# Patient Record
Sex: MALE | Race: WHITE | NOT HISPANIC OR LATINO | Employment: OTHER | URBAN - METROPOLITAN AREA
[De-identification: names, ages, dates, MRNs, and addresses within clinical notes are randomized per-mention and may not be internally consistent; named-entity substitution may affect disease eponyms.]

---

## 2023-03-06 DIAGNOSIS — F32.A DEPRESSION, UNSPECIFIED DEPRESSION TYPE: Primary | ICD-10-CM

## 2023-03-06 DIAGNOSIS — G44.86 CERVICOGENIC HEADACHE: Primary | ICD-10-CM

## 2023-03-06 DIAGNOSIS — G44.86 CERVICOGENIC HEADACHE: ICD-10-CM

## 2023-03-06 RX ORDER — TOPIRAMATE 100 MG/1
1 TABLET, FILM COATED ORAL NIGHTLY
COMMUNITY
Start: 2021-03-10 | End: 2023-03-06 | Stop reason: SDUPTHER

## 2023-03-06 RX ORDER — DULOXETIN HYDROCHLORIDE 30 MG/1
1 CAPSULE, DELAYED RELEASE ORAL DAILY
COMMUNITY
Start: 2021-08-23 | End: 2023-03-06 | Stop reason: SDUPTHER

## 2023-03-06 RX ORDER — DULOXETIN HYDROCHLORIDE 60 MG/1
1 CAPSULE, DELAYED RELEASE ORAL DAILY
COMMUNITY
Start: 2021-08-23 | End: 2023-03-06 | Stop reason: SDUPTHER

## 2023-03-11 DIAGNOSIS — F32.A DEPRESSION, UNSPECIFIED DEPRESSION TYPE: ICD-10-CM

## 2023-03-11 PROBLEM — H02.402: Status: ACTIVE | Noted: 2023-03-11

## 2023-03-11 PROBLEM — K21.9 ESOPHAGEAL REFLUX: Status: ACTIVE | Noted: 2023-03-11

## 2023-03-11 PROBLEM — R35.0 URINARY FREQUENCY: Status: ACTIVE | Noted: 2023-03-11

## 2023-03-11 PROBLEM — I10 HYPERTENSION: Status: ACTIVE | Noted: 2023-03-11

## 2023-03-11 PROBLEM — S46.811A STRAIN OF TRAPEZIUS MUSCLE, RIGHT, INITIAL ENCOUNTER: Status: ACTIVE | Noted: 2023-03-11

## 2023-03-11 PROBLEM — M79.10 MYALGIA: Status: ACTIVE | Noted: 2023-03-11

## 2023-03-11 PROBLEM — G47.00 INSOMNIA: Status: ACTIVE | Noted: 2023-03-11

## 2023-03-11 PROBLEM — K40.90 INGUINAL HERNIA, LEFT: Status: ACTIVE | Noted: 2023-03-11

## 2023-03-11 PROBLEM — M79.645 PAIN OF FINGER OF LEFT HAND: Status: ACTIVE | Noted: 2023-03-11

## 2023-03-11 PROBLEM — K21.9 GERD (GASTROESOPHAGEAL REFLUX DISEASE): Status: ACTIVE | Noted: 2023-03-11

## 2023-03-11 PROBLEM — M25.521 RIGHT ELBOW PAIN: Status: ACTIVE | Noted: 2023-03-11

## 2023-03-11 PROBLEM — L85.3 XEROSIS CUTIS: Status: ACTIVE | Noted: 2023-03-11

## 2023-03-11 PROBLEM — S16.1XXA NECK STRAIN: Status: ACTIVE | Noted: 2023-03-11

## 2023-03-11 PROBLEM — H54.8 LEGALLY BLIND IN RIGHT EYE, AS DEFINED IN USA: Status: ACTIVE | Noted: 2023-03-11

## 2023-03-11 PROBLEM — F52.21 ERECTILE DISORDER, ACQUIRED, GENERALIZED, SEVERE: Status: ACTIVE | Noted: 2023-03-11

## 2023-03-11 PROBLEM — M25.50 ARTHRALGIA OF MULTIPLE SITES: Status: ACTIVE | Noted: 2023-03-11

## 2023-03-11 PROBLEM — E66.9 OBESITY: Status: ACTIVE | Noted: 2023-03-11

## 2023-03-11 PROBLEM — D75.89 MACROCYTOSIS: Status: ACTIVE | Noted: 2023-03-11

## 2023-03-11 PROBLEM — T83.511D URINARY TRACT INFECTION ASSOCIATED WITH CATHETERIZATION OF URINARY TRACT, SUBSEQUENT ENCOUNTER: Status: ACTIVE | Noted: 2023-03-11

## 2023-03-11 PROBLEM — G44.86 CERVICOGENIC HEADACHE: Status: ACTIVE | Noted: 2023-03-11

## 2023-03-11 PROBLEM — M51.36 DISC DEGENERATION, LUMBAR: Status: ACTIVE | Noted: 2023-03-11

## 2023-03-11 PROBLEM — Z96.0 S/P INSERTION OF PENILE IMPLANT: Status: ACTIVE | Noted: 2023-03-11

## 2023-03-11 PROBLEM — M54.50 LOW BACK PAIN: Status: ACTIVE | Noted: 2023-03-11

## 2023-03-11 PROBLEM — R68.82 LOW LIBIDO: Status: ACTIVE | Noted: 2023-03-11

## 2023-03-11 PROBLEM — J30.9 ALLERGIC RHINITIS: Status: ACTIVE | Noted: 2023-03-11

## 2023-03-11 PROBLEM — D53.9 MACROCYTIC ANEMIA: Status: ACTIVE | Noted: 2023-03-11

## 2023-03-11 PROBLEM — E11.9 DIABETES MELLITUS WITHOUT OPHTHALMIC MANIFESTATIONS (MULTI): Status: ACTIVE | Noted: 2023-03-11

## 2023-03-11 PROBLEM — G89.29 CHRONIC HEADACHES: Status: ACTIVE | Noted: 2023-03-11

## 2023-03-11 PROBLEM — G47.33 OBSTRUCTIVE SLEEP APNEA, ADULT: Status: ACTIVE | Noted: 2023-03-11

## 2023-03-11 PROBLEM — E78.5 DYSLIPIDEMIA: Status: ACTIVE | Noted: 2023-03-11

## 2023-03-11 PROBLEM — T83.518A: Status: ACTIVE | Noted: 2023-03-11

## 2023-03-11 PROBLEM — H43.819 PVD (POSTERIOR VITREOUS DETACHMENT): Status: ACTIVE | Noted: 2023-03-11

## 2023-03-11 PROBLEM — M79.646 THUMB PAIN: Status: ACTIVE | Noted: 2023-03-11

## 2023-03-11 PROBLEM — N39.0 RECURRENT URINARY TRACT INFECTION: Status: ACTIVE | Noted: 2023-03-11

## 2023-03-11 PROBLEM — H40.032 ANATOMICAL NARROW ANGLE OF LEFT EYE: Status: ACTIVE | Noted: 2023-03-11

## 2023-03-11 PROBLEM — H01.016 BILATERAL ULCERATIVE BLEPHARITIS: Status: ACTIVE | Noted: 2023-03-11

## 2023-03-11 PROBLEM — R60.9 DEPENDENT EDEMA: Status: ACTIVE | Noted: 2023-03-11

## 2023-03-11 PROBLEM — M18.11 PRIMARY OSTEOARTHRITIS OF FIRST CARPOMETACARPAL JOINT OF RIGHT HAND: Status: ACTIVE | Noted: 2023-03-11

## 2023-03-11 PROBLEM — E87.6 HYPOKALEMIA: Status: ACTIVE | Noted: 2023-03-11

## 2023-03-11 PROBLEM — M72.0 DUPUYTREN'S CONTRACTURE OF LEFT HAND: Status: ACTIVE | Noted: 2023-03-11

## 2023-03-11 PROBLEM — N39.0 URINARY TRACT INFECTION ASSOCIATED WITH CATHETERIZATION OF URINARY TRACT, SUBSEQUENT ENCOUNTER: Status: ACTIVE | Noted: 2023-03-11

## 2023-03-11 PROBLEM — H54.8 LEGALLY BLIND: Status: ACTIVE | Noted: 2023-03-11

## 2023-03-11 PROBLEM — E55.9 VITAMIN D DEFICIENCY: Status: ACTIVE | Noted: 2023-03-11

## 2023-03-11 PROBLEM — N52.9 MALE ERECTILE DISORDER OF ORGANIC ORIGIN: Status: ACTIVE | Noted: 2023-03-11

## 2023-03-11 PROBLEM — J45.909 ASTHMA (HHS-HCC): Status: ACTIVE | Noted: 2023-03-11

## 2023-03-11 PROBLEM — H25.11 CATARACT, NUCLEAR SCLEROTIC, RIGHT EYE: Status: ACTIVE | Noted: 2023-03-11

## 2023-03-11 PROBLEM — I73.9 PAD (PERIPHERAL ARTERY DISEASE) (CMS-HCC): Status: ACTIVE | Noted: 2023-03-11

## 2023-03-11 PROBLEM — M51.369 DISC DEGENERATION, LUMBAR: Status: ACTIVE | Noted: 2023-03-11

## 2023-03-11 PROBLEM — R94.31 ABNORMAL EKG: Status: ACTIVE | Noted: 2023-03-11

## 2023-03-11 PROBLEM — H02.401: Status: ACTIVE | Noted: 2023-03-11

## 2023-03-11 PROBLEM — R97.20 PSA ELEVATION: Status: ACTIVE | Noted: 2023-03-11

## 2023-03-11 PROBLEM — M79.7 FIBROMYALGIA: Status: ACTIVE | Noted: 2023-03-11

## 2023-03-11 PROBLEM — N28.89 RENAL CALCIFICATION: Status: ACTIVE | Noted: 2023-03-11

## 2023-03-11 PROBLEM — H54.40 BLIND RIGHT EYE: Status: ACTIVE | Noted: 2023-03-11

## 2023-03-11 PROBLEM — B35.1 ONYCHOMYCOSIS: Status: ACTIVE | Noted: 2023-03-11

## 2023-03-11 PROBLEM — K86.9 LESION OF PANCREAS (HHS-HCC): Status: ACTIVE | Noted: 2023-03-11

## 2023-03-11 PROBLEM — M54.14 THORACIC RADICULOPATHY: Status: ACTIVE | Noted: 2023-03-11

## 2023-03-11 PROBLEM — H40.032 NARROW ANGLE OF ANTERIOR CHAMBER OF LEFT EYE: Status: ACTIVE | Noted: 2023-03-11

## 2023-03-11 PROBLEM — R53.83 FATIGUE: Status: ACTIVE | Noted: 2023-03-11

## 2023-03-11 PROBLEM — H40.052 OCULAR HYPERTENSION OF LEFT EYE: Status: ACTIVE | Noted: 2023-03-11

## 2023-03-11 PROBLEM — T83.518A: Status: RESOLVED | Noted: 2023-03-11 | Resolved: 2023-03-11

## 2023-03-11 PROBLEM — M54.16 LEFT LUMBAR RADICULOPATHY: Status: ACTIVE | Noted: 2023-03-11

## 2023-03-11 PROBLEM — B35.3 TINEA PEDIS OF BOTH FEET: Status: ACTIVE | Noted: 2023-03-11

## 2023-03-11 PROBLEM — M79.609 LIMB PAIN: Status: ACTIVE | Noted: 2023-03-11

## 2023-03-11 PROBLEM — N39.0: Status: ACTIVE | Noted: 2023-03-11

## 2023-03-11 PROBLEM — H34.8112 CENTRAL RETINAL VEIN OCCLUSION OF RIGHT EYE (CMS-HCC): Status: ACTIVE | Noted: 2023-03-11

## 2023-03-11 PROBLEM — D64.9 ANEMIA: Status: ACTIVE | Noted: 2023-03-11

## 2023-03-11 PROBLEM — N40.1 ENLARGED PROSTATE WITH LOWER URINARY TRACT SYMPTOMS (LUTS): Status: ACTIVE | Noted: 2023-03-11

## 2023-03-11 PROBLEM — M54.2 NECK PAIN: Status: ACTIVE | Noted: 2023-03-11

## 2023-03-11 PROBLEM — R51.9 CHRONIC HEADACHES: Status: ACTIVE | Noted: 2023-03-11

## 2023-03-11 PROBLEM — R39.15 URINARY URGENCY: Status: ACTIVE | Noted: 2023-03-11

## 2023-03-11 PROBLEM — H04.123 BILATERAL DRY EYES: Status: ACTIVE | Noted: 2023-03-11

## 2023-03-11 PROBLEM — H01.013 BILATERAL ULCERATIVE BLEPHARITIS: Status: ACTIVE | Noted: 2023-03-11

## 2023-03-11 PROBLEM — I77.819 AORTIC DILATATION (CMS-HCC): Status: ACTIVE | Noted: 2023-03-11

## 2023-03-11 PROBLEM — M50.10 HERNIATION OF CERVICAL INTERVERTEBRAL DISC WITH RADICULOPATHY: Status: ACTIVE | Noted: 2023-03-11

## 2023-03-11 PROBLEM — M25.562 LEFT KNEE PAIN: Status: ACTIVE | Noted: 2023-03-11

## 2023-03-11 PROBLEM — N39.41 URGE INCONTINENCE OF URINE: Status: ACTIVE | Noted: 2023-03-11

## 2023-03-11 PROBLEM — D69.6 THROMBOCYTOPENIA (CMS-HCC): Status: ACTIVE | Noted: 2023-03-11

## 2023-03-11 PROBLEM — K86.1 CHRONIC PANCREATITIS (MULTI): Status: ACTIVE | Noted: 2023-03-11

## 2023-03-11 PROBLEM — M47.892 OTHER SPONDYLOSIS, CERVICAL REGION: Status: ACTIVE | Noted: 2023-03-11

## 2023-03-11 PROBLEM — M19.90 OSTEOARTHRITIS: Status: ACTIVE | Noted: 2023-03-11

## 2023-03-11 PROBLEM — H35.89 RETINAL MACULAR ATROPHY: Status: ACTIVE | Noted: 2023-03-11

## 2023-03-11 PROBLEM — H18.49: Status: ACTIVE | Noted: 2023-03-11

## 2023-03-11 PROBLEM — F40.240 CLAUSTROPHOBIA: Status: ACTIVE | Noted: 2023-03-11

## 2023-03-11 PROBLEM — Z96.1 PSEUDOPHAKIA OF LEFT EYE: Status: ACTIVE | Noted: 2023-03-11

## 2023-03-11 PROBLEM — H18.20 CORNEAL EDEMA: Status: ACTIVE | Noted: 2023-03-11

## 2023-03-11 PROBLEM — N39.0: Status: RESOLVED | Noted: 2023-03-11 | Resolved: 2023-03-11

## 2023-03-11 PROBLEM — M77.11 LATERAL EPICONDYLITIS OF RIGHT ELBOW: Status: ACTIVE | Noted: 2023-03-11

## 2023-03-11 PROBLEM — B00.52: Status: ACTIVE | Noted: 2023-03-11

## 2023-03-11 PROBLEM — E29.1 HYPOGONADISM MALE: Status: ACTIVE | Noted: 2023-03-11

## 2023-03-11 PROBLEM — N39.0 RECURRENT UTI: Status: ACTIVE | Noted: 2023-03-11

## 2023-03-11 PROBLEM — M47.816 LUMBAR SPONDYLOSIS: Status: ACTIVE | Noted: 2023-03-11

## 2023-03-11 PROBLEM — I87.2 CHRONIC VENOUS INSUFFICIENCY: Status: ACTIVE | Noted: 2023-03-11

## 2023-03-11 PROBLEM — H18.529 CORNEAL EPITHELIAL AND BASEMENT MEMBRANE DYSTROPHY: Status: ACTIVE | Noted: 2023-03-11

## 2023-03-11 PROBLEM — N52.8 OTHER MALE ERECTILE DYSFUNCTION: Status: ACTIVE | Noted: 2023-03-11

## 2023-03-11 PROBLEM — Q45.3 PANCREATIC ABNORMALITY: Status: ACTIVE | Noted: 2023-03-11

## 2023-03-11 PROBLEM — E85.9: Status: ACTIVE | Noted: 2023-03-11

## 2023-03-11 PROBLEM — N40.0 ENLARGED PROSTATE: Status: ACTIVE | Noted: 2023-03-11

## 2023-03-11 PROBLEM — Z98.84 HISTORY OF ROUX-EN-Y GASTRIC BYPASS: Status: ACTIVE | Noted: 2023-03-11

## 2023-03-11 PROBLEM — M96.1 POSTLAMINECTOMY SYNDROME, LUMBAR: Status: ACTIVE | Noted: 2023-03-11

## 2023-03-11 RX ORDER — PANTOPRAZOLE SODIUM 20 MG/1
1 TABLET, DELAYED RELEASE ORAL DAILY
COMMUNITY
Start: 2020-09-08 | End: 2023-09-09

## 2023-03-11 RX ORDER — ALBUTEROL SULFATE 90 UG/1
2 AEROSOL, METERED RESPIRATORY (INHALATION) EVERY 4 HOURS PRN
COMMUNITY
Start: 2016-01-12

## 2023-03-11 RX ORDER — PEN NEEDLE, DIABETIC 31 GX3/16"
NEEDLE, DISPOSABLE MISCELLANEOUS
COMMUNITY
Start: 2022-06-17 | End: 2023-08-30 | Stop reason: ALTCHOICE

## 2023-03-11 RX ORDER — LATANOPROST 50 UG/ML
1 SOLUTION/ DROPS OPHTHALMIC NIGHTLY
COMMUNITY
Start: 2020-06-29 | End: 2024-03-11 | Stop reason: SDUPTHER

## 2023-03-11 RX ORDER — MONTELUKAST SODIUM 10 MG/1
1 TABLET ORAL DAILY
COMMUNITY
Start: 2020-07-14 | End: 2023-09-09

## 2023-03-11 RX ORDER — CLONIDINE HYDROCHLORIDE 0.2 MG/1
1 TABLET ORAL 2 TIMES DAILY
COMMUNITY
Start: 2020-09-04 | End: 2023-08-30 | Stop reason: ALTCHOICE

## 2023-03-11 RX ORDER — LANCETS
EACH MISCELLANEOUS 3 TIMES DAILY
COMMUNITY
End: 2023-08-30 | Stop reason: ALTCHOICE

## 2023-03-11 RX ORDER — KETOCONAZOLE 20 MG/G
1 CREAM TOPICAL 2 TIMES DAILY
COMMUNITY
Start: 2019-03-01 | End: 2023-08-30 | Stop reason: ALTCHOICE

## 2023-03-11 RX ORDER — TALC
1 POWDER (GRAM) TOPICAL NIGHTLY
COMMUNITY
Start: 2015-08-07

## 2023-03-11 RX ORDER — ACYCLOVIR 800 MG/1
1 TABLET ORAL DAILY
COMMUNITY
Start: 2020-10-30 | End: 2023-09-09

## 2023-03-11 RX ORDER — TIZANIDINE 4 MG/1
1 TABLET ORAL 3 TIMES DAILY
COMMUNITY
Start: 2018-08-24 | End: 2023-09-09

## 2023-03-11 RX ORDER — BUDESONIDE AND FORMOTEROL FUMARATE DIHYDRATE 160; 4.5 UG/1; UG/1
2 AEROSOL RESPIRATORY (INHALATION) 2 TIMES DAILY
COMMUNITY
Start: 2020-07-07 | End: 2023-08-30 | Stop reason: ALTCHOICE

## 2023-03-11 RX ORDER — CHOLECALCIFEROL (VITAMIN D3) 50 MCG
2 TABLET ORAL 2 TIMES DAILY
COMMUNITY
Start: 2015-08-07

## 2023-03-11 RX ORDER — ATORVASTATIN CALCIUM 10 MG/1
1 TABLET, FILM COATED ORAL DAILY
COMMUNITY
Start: 2016-01-11 | End: 2023-09-09

## 2023-03-11 RX ORDER — BLOOD SUGAR DIAGNOSTIC
STRIP MISCELLANEOUS 3 TIMES DAILY
COMMUNITY
Start: 2013-10-21

## 2023-03-11 RX ORDER — DIPHENHYDRAMINE HCL 25 MG
50 TABLET ORAL NIGHTLY
COMMUNITY
Start: 2015-08-07 | End: 2024-02-14 | Stop reason: ALTCHOICE

## 2023-03-11 RX ORDER — GLIPIZIDE 10 MG/1
1 TABLET, FILM COATED, EXTENDED RELEASE ORAL
COMMUNITY
Start: 2020-05-07 | End: 2024-02-14 | Stop reason: ALTCHOICE

## 2023-03-11 RX ORDER — SODIUM CHLORIDE 50 MG/ML
1 SOLUTION/ DROPS OPHTHALMIC 2 TIMES DAILY
COMMUNITY
Start: 2021-03-15 | End: 2023-08-30 | Stop reason: ALTCHOICE

## 2023-03-11 RX ORDER — METFORMIN HYDROCHLORIDE 500 MG/1
4 TABLET, EXTENDED RELEASE ORAL
COMMUNITY
Start: 2018-05-15 | End: 2024-02-14 | Stop reason: SINTOL

## 2023-03-11 RX ORDER — SODIUM CHLORIDE 5 %
1 OINTMENT (GRAM) OPHTHALMIC (EYE) NIGHTLY
COMMUNITY
Start: 2021-03-15 | End: 2023-08-30 | Stop reason: ALTCHOICE

## 2023-03-11 RX ORDER — LISINOPRIL 40 MG/1
1 TABLET ORAL DAILY
COMMUNITY
Start: 2018-12-18 | End: 2023-09-09

## 2023-03-12 RX ORDER — TOPIRAMATE 100 MG/1
100 TABLET, FILM COATED ORAL NIGHTLY
Qty: 30 TABLET | Refills: 0 | Status: SHIPPED | OUTPATIENT
Start: 2023-03-12 | End: 2023-08-30 | Stop reason: SDUPTHER

## 2023-03-12 RX ORDER — DULOXETIN HYDROCHLORIDE 30 MG/1
30 CAPSULE, DELAYED RELEASE ORAL DAILY
Qty: 30 CAPSULE | Refills: 0 | Status: SHIPPED | OUTPATIENT
Start: 2023-03-12 | End: 2023-03-14 | Stop reason: SDUPTHER

## 2023-03-15 ENCOUNTER — TELEPHONE (OUTPATIENT)
Dept: PRIMARY CARE | Facility: CLINIC | Age: 73
End: 2023-03-15

## 2023-03-15 RX ORDER — DULOXETIN HYDROCHLORIDE 60 MG/1
CAPSULE, DELAYED RELEASE ORAL
Qty: 90 CAPSULE | Refills: 1 | Status: SHIPPED | OUTPATIENT
Start: 2023-03-15 | End: 2024-01-03 | Stop reason: SDUPTHER

## 2023-03-15 RX ORDER — TOPIRAMATE 100 MG/1
TABLET, FILM COATED ORAL
Qty: 90 TABLET | Refills: 1 | Status: SHIPPED | OUTPATIENT
Start: 2023-03-15 | End: 2024-02-14 | Stop reason: ALTCHOICE

## 2023-03-15 RX ORDER — DULOXETIN HYDROCHLORIDE 30 MG/1
30 CAPSULE, DELAYED RELEASE ORAL DAILY
Qty: 90 CAPSULE | Refills: 1 | Status: SHIPPED | OUTPATIENT
Start: 2023-03-15 | End: 2023-04-07

## 2023-03-15 RX ORDER — DULOXETIN HYDROCHLORIDE 30 MG/1
CAPSULE, DELAYED RELEASE ORAL
Qty: 90 CAPSULE | OUTPATIENT
Start: 2023-03-15

## 2023-04-05 DIAGNOSIS — F32.A DEPRESSION, UNSPECIFIED DEPRESSION TYPE: ICD-10-CM

## 2023-04-07 RX ORDER — DULOXETIN HYDROCHLORIDE 30 MG/1
CAPSULE, DELAYED RELEASE ORAL
Qty: 30 CAPSULE | Refills: 0 | Status: SHIPPED | OUTPATIENT
Start: 2023-04-07 | End: 2023-09-13 | Stop reason: SDUPTHER

## 2023-04-11 ENCOUNTER — OFFICE VISIT (OUTPATIENT)
Dept: PRIMARY CARE | Facility: CLINIC | Age: 73
End: 2023-04-11
Payer: MEDICARE

## 2023-04-11 VITALS
SYSTOLIC BLOOD PRESSURE: 133 MMHG | TEMPERATURE: 97.3 F | DIASTOLIC BLOOD PRESSURE: 71 MMHG | RESPIRATION RATE: 15 BRPM | HEIGHT: 68 IN | WEIGHT: 214 LBS | BODY MASS INDEX: 32.43 KG/M2 | OXYGEN SATURATION: 97 % | HEART RATE: 61 BPM

## 2023-04-11 DIAGNOSIS — G89.29 CHRONIC NONINTRACTABLE HEADACHE, UNSPECIFIED HEADACHE TYPE: ICD-10-CM

## 2023-04-11 DIAGNOSIS — N39.41 URGE INCONTINENCE OF URINE: ICD-10-CM

## 2023-04-11 DIAGNOSIS — R51.9 CHRONIC NONINTRACTABLE HEADACHE, UNSPECIFIED HEADACHE TYPE: ICD-10-CM

## 2023-04-11 DIAGNOSIS — I10 HYPERTENSION, UNSPECIFIED TYPE: ICD-10-CM

## 2023-04-11 DIAGNOSIS — K21.9 GASTROESOPHAGEAL REFLUX DISEASE WITHOUT ESOPHAGITIS: ICD-10-CM

## 2023-04-11 DIAGNOSIS — R53.83 OTHER FATIGUE: ICD-10-CM

## 2023-04-11 DIAGNOSIS — E11.9 DIABETES MELLITUS WITHOUT OPHTHALMIC MANIFESTATIONS (MULTI): ICD-10-CM

## 2023-04-11 DIAGNOSIS — Z12.5 SCREENING PSA (PROSTATE SPECIFIC ANTIGEN): ICD-10-CM

## 2023-04-11 DIAGNOSIS — S72.92XF TYPE III OPEN FRACTURE OF LEFT FEMUR WITH ROUTINE HEALING, UNSPECIFIED FRACTURE MORPHOLOGY, UNSPECIFIED PORTION OF FEMUR, SUBSEQUENT ENCOUNTER: ICD-10-CM

## 2023-04-11 DIAGNOSIS — E78.5 HYPERLIPIDEMIA, UNSPECIFIED HYPERLIPIDEMIA TYPE: ICD-10-CM

## 2023-04-11 DIAGNOSIS — K86.9 LESION OF PANCREAS (HHS-HCC): ICD-10-CM

## 2023-04-11 DIAGNOSIS — H34.8112 CENTRAL RETINAL VEIN OCCLUSION OF RIGHT EYE, UNSPECIFIED COMPLICATION STATUS (CMS-HCC): ICD-10-CM

## 2023-04-11 DIAGNOSIS — H04.123 BILATERAL DRY EYES: ICD-10-CM

## 2023-04-11 DIAGNOSIS — M89.9 DISORDER OF BONE, UNSPECIFIED: ICD-10-CM

## 2023-04-11 DIAGNOSIS — F32.89 OTHER DEPRESSION: Primary | ICD-10-CM

## 2023-04-11 PROBLEM — N39.0 RECURRENT URINARY TRACT INFECTION: Status: RESOLVED | Noted: 2023-03-11 | Resolved: 2023-04-11

## 2023-04-11 PROBLEM — N39.0 RECURRENT UTI: Status: RESOLVED | Noted: 2023-03-11 | Resolved: 2023-04-11

## 2023-04-11 PROBLEM — K86.1 CHRONIC PANCREATITIS (MULTI): Status: RESOLVED | Noted: 2023-03-11 | Resolved: 2023-04-11

## 2023-04-11 LAB
ALBUMIN (MG/L) IN URINE: 9.1 MG/L
ALBUMIN/CREATININE (UG/MG) IN URINE: 12.7 UG/MG CRT (ref 0–30)
CREATININE (MG/DL) IN URINE: 71.6 MG/DL (ref 20–370)

## 2023-04-11 PROCEDURE — 1036F TOBACCO NON-USER: CPT | Performed by: PEDIATRICS

## 2023-04-11 PROCEDURE — 3075F SYST BP GE 130 - 139MM HG: CPT | Performed by: PEDIATRICS

## 2023-04-11 PROCEDURE — 82570 ASSAY OF URINE CREATININE: CPT

## 2023-04-11 PROCEDURE — 1126F AMNT PAIN NOTED NONE PRSNT: CPT | Performed by: PEDIATRICS

## 2023-04-11 PROCEDURE — 99214 OFFICE O/P EST MOD 30 MIN: CPT | Performed by: PEDIATRICS

## 2023-04-11 PROCEDURE — 4010F ACE/ARB THERAPY RXD/TAKEN: CPT | Performed by: PEDIATRICS

## 2023-04-11 PROCEDURE — 82043 UR ALBUMIN QUANTITATIVE: CPT

## 2023-04-11 PROCEDURE — 1160F RVW MEDS BY RX/DR IN RCRD: CPT | Performed by: PEDIATRICS

## 2023-04-11 PROCEDURE — 1159F MED LIST DOCD IN RCRD: CPT | Performed by: PEDIATRICS

## 2023-04-11 PROCEDURE — 3078F DIAST BP <80 MM HG: CPT | Performed by: PEDIATRICS

## 2023-04-11 ASSESSMENT — PAIN SCALES - GENERAL: PAINLEVEL: 0-NO PAIN

## 2023-04-11 NOTE — ASSESSMENT & PLAN NOTE
Dealing with frequent incontinence, seeing urology recently for these issues. Scheduled for volumetric study and potential botex injection to the bladder.

## 2023-04-11 NOTE — ASSESSMENT & PLAN NOTE
"Depression has been \"running rampant\" recently. He has had a hard time finding a counselor on the [place] side [facility]. Taking Cymbalta 90mg daily and believes that it is not working as well recently because he is having breakthrough crying episodes. Plans to see a mental health nurse practitioner  "

## 2023-04-11 NOTE — PROGRESS NOTES
"Subjective   Patient ID: Elliot Larson is a 73 y.o. male who presents for Follow-up.    HPI Pt states that ever since he fell and broke his femur in Jan 2022 his life has went downhill. He endorses several recent falls about monthly. A fall in January was particularly bad -- fell on his hip and had a large bruise and also hit head. Did not lose consciousness. Had bronchitis over the winter and Azithromycin cleared it up quickly. Today he overall feels okay physically but is dealing with depression. Things have been difficult since his leg fracture. He is primarily upset about his loss of independence and mobility. Has not been checking his sugars at home. Is interested in a continuous glucose monitor but believes he may need to be on insulin. Still dealing with urinary incontinence. Has a hard time holding his urine in. He follows with urology for these issues. Denies recent illness, SOB, chest pain.   Colonoscopy due 2025  Shingles vaccine due  Will be seeing eye specialist  Review of Systems    Objective   /71 (BP Location: Right arm, Patient Position: Sitting, BP Cuff Size: Large adult)   Pulse 61   Temp 36.3 °C (97.3 °F) (Temporal)   Resp 15   Ht 1.727 m (5' 8\")   Wt 97.1 kg (214 lb)   SpO2 97%   BMI 32.54 kg/m²     Physical Exam  Vitals reviewed.   Constitutional:       General: He is not in acute distress.  HENT:      Head: Normocephalic.      Right Ear: Tympanic membrane normal.      Left Ear: Tympanic membrane normal.      Nose: Nose normal.      Mouth/Throat:      Pharynx: Oropharynx is clear.   Cardiovascular:      Rate and Rhythm: Normal rate and regular rhythm.      Heart sounds: Normal heart sounds.   Pulmonary:      Breath sounds: Normal breath sounds.   Abdominal:      Palpations: Abdomen is soft.      Tenderness: There is no abdominal tenderness.   Musculoskeletal:         General: No tenderness.      Cervical back: Neck supple.      Comments: Walks with cane; well healed scar over " "left knee and lower thigh   Skin:     Findings: No rash.      Comments: Dry skin both feet; hard to feel pedal pulses  Decreased sensation right heel   Neurological:      General: No focal deficit present.      Mental Status: He is alert.   Psychiatric:         Mood and Affect: Mood normal.         Assessment/Plan   Problem List Items Addressed This Visit          Nervous    Chronic headaches     Take topiramate daily. Helps sleep and somewhat helps headaches.             Circulatory    Central retinal vein occlusion of right eye     No vision in right eye since. Follows regularly with eye doctor.          Hypertension     133/71. Reports often missing doses of his BP meds.             Digestive    GERD (gastroesophageal reflux disease)     Well controlled with the Pantoprazole.          Lesion of pancreas     Has yearly MRIs for surveillance.             Genitourinary    Urge incontinence of urine     Dealing with frequent incontinence, seeing urology recently for these issues. Scheduled for volumetric study and potential botex injection to the bladder.             Endocrine/Metabolic    Diabetes mellitus without ophthalmic manifestations (CMS/HCC)     Not been checking his sugars. Is interested in continuous glucose monitoring.          Relevant Orders    Hemoglobin A1c    Albumin, urine, random (Completed)       Other    Bilateral dry eyes     Utilizing eye drops.         Depression - Primary     Depression has been \"running rampant\" recently. He has had a hard time finding a counselor on the [place] side [facility]. Taking Cymbalta 90mg daily and believes that it is not working as well recently because he is having breakthrough crying episodes. Plans to see a mental health nurse practitioner         Fatigue    Relevant Orders    TSH    Vitamin D, Total     Other Visit Diagnoses       Hyperlipidemia, unspecified hyperlipidemia type        Relevant Orders    Lipid panel    Screening PSA (prostate specific antigen)  "       Relevant Orders    PSA    Type III open fracture of left femur with routine healing, unspecified fracture morphology, unspecified portion of femur, subsequent encounter        Relevant Orders    Referral to Physical Therapy    Disability Placard    Disorder of bone, unspecified        Relevant Orders    Vitamin D, Total

## 2023-04-28 ENCOUNTER — TELEPHONE (OUTPATIENT)
Dept: PRIMARY CARE | Facility: CLINIC | Age: 73
End: 2023-04-28
Payer: MEDICARE

## 2023-04-28 NOTE — TELEPHONE ENCOUNTER
Unable to reach patient by phone left voice mail message for Patient we will mail out urine results.

## 2023-07-06 ENCOUNTER — TELEPHONE (OUTPATIENT)
Dept: PRIMARY CARE | Facility: CLINIC | Age: 73
End: 2023-07-06
Payer: MEDICARE

## 2023-07-06 NOTE — TELEPHONE ENCOUNTER
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 Called saying that this past weekend he fell on Sunday broke his hip and in a nursing home  now for rehab call him at [phone].

## 2023-08-15 ENCOUNTER — TELEPHONE (OUTPATIENT)
Dept: PRIMARY CARE | Facility: CLINIC | Age: 73
End: 2023-08-15

## 2023-08-30 ENCOUNTER — OFFICE VISIT (OUTPATIENT)
Dept: PRIMARY CARE | Facility: CLINIC | Age: 73
End: 2023-08-30
Payer: MEDICARE

## 2023-08-30 VITALS
HEIGHT: 68 IN | SYSTOLIC BLOOD PRESSURE: 147 MMHG | RESPIRATION RATE: 16 BRPM | WEIGHT: 207 LBS | DIASTOLIC BLOOD PRESSURE: 72 MMHG | TEMPERATURE: 97.3 F | OXYGEN SATURATION: 99 % | BODY MASS INDEX: 31.37 KG/M2 | HEART RATE: 60 BPM

## 2023-08-30 DIAGNOSIS — Z87.81: ICD-10-CM

## 2023-08-30 DIAGNOSIS — F32.89 OTHER DEPRESSION: ICD-10-CM

## 2023-08-30 DIAGNOSIS — E11.9 TYPE 2 DIABETES MELLITUS WITHOUT COMPLICATION, WITHOUT LONG-TERM CURRENT USE OF INSULIN (MULTI): ICD-10-CM

## 2023-08-30 DIAGNOSIS — E78.5 DYSLIPIDEMIA: ICD-10-CM

## 2023-08-30 DIAGNOSIS — Q45.3 PANCREATIC ABNORMALITY: ICD-10-CM

## 2023-08-30 DIAGNOSIS — J45.909 ASTHMA, UNSPECIFIED ASTHMA SEVERITY, UNSPECIFIED WHETHER COMPLICATED, UNSPECIFIED WHETHER PERSISTENT (HHS-HCC): ICD-10-CM

## 2023-08-30 DIAGNOSIS — E66.09 CLASS 1 OBESITY DUE TO EXCESS CALORIES WITH SERIOUS COMORBIDITY AND BODY MASS INDEX (BMI) OF 31.0 TO 31.9 IN ADULT: ICD-10-CM

## 2023-08-30 DIAGNOSIS — K21.9 GASTROESOPHAGEAL REFLUX DISEASE, UNSPECIFIED WHETHER ESOPHAGITIS PRESENT: ICD-10-CM

## 2023-08-30 DIAGNOSIS — I10 HYPERTENSION, UNSPECIFIED TYPE: Primary | ICD-10-CM

## 2023-08-30 DIAGNOSIS — R97.20 PSA ELEVATION: ICD-10-CM

## 2023-08-30 DIAGNOSIS — N32.81 OVERACTIVE BLADDER: ICD-10-CM

## 2023-08-30 DIAGNOSIS — G47.33 OBSTRUCTIVE SLEEP APNEA, ADULT: ICD-10-CM

## 2023-08-30 DIAGNOSIS — E55.9 VITAMIN D DEFICIENCY: ICD-10-CM

## 2023-08-30 DIAGNOSIS — M84.68XA PATHOLOGICAL FRACTURE IN OTHER DISEASE, OTHER SITE, INITIAL ENCOUNTER FOR FRACTURE: ICD-10-CM

## 2023-08-30 DIAGNOSIS — Z98.84 HISTORY OF GASTRIC BYPASS: ICD-10-CM

## 2023-08-30 PROBLEM — R26.2 DIFFICULTY WALKING: Status: ACTIVE | Noted: 2018-04-24

## 2023-08-30 PROBLEM — M67.972 DISORDER OF LEFT ACHILLES TENDON: Status: ACTIVE | Noted: 2018-04-24

## 2023-08-30 PROBLEM — N39.0 RECURRENT URINARY TRACT INFECTION: Status: ACTIVE | Noted: 2023-08-30

## 2023-08-30 PROBLEM — N39.0 RECURRENT UTI: Status: ACTIVE | Noted: 2023-08-30

## 2023-08-30 PROBLEM — K86.1 CHRONIC PANCREATITIS (MULTI): Status: ACTIVE | Noted: 2023-08-30

## 2023-08-30 PROBLEM — H40.059 OCULAR HYPERTENSION: Status: ACTIVE | Noted: 2023-08-30

## 2023-08-30 PROBLEM — U07.1 DISEASE DUE TO SEVERE ACUTE RESPIRATORY SYNDROME CORONAVIRUS 2 (SARS-COV-2): Status: ACTIVE | Noted: 2022-02-01

## 2023-08-30 PROBLEM — M77.11 RIGHT LATERAL EPICONDYLITIS: Status: ACTIVE | Noted: 2023-08-30

## 2023-08-30 PROBLEM — T81.10XA: Status: ACTIVE | Noted: 2022-02-01

## 2023-08-30 PROBLEM — M72.0 DUPUYTREN'S FIBROMATOSIS: Status: ACTIVE | Noted: 2023-08-30

## 2023-08-30 PROBLEM — M97.8XXA PERIPROSTHETIC FRACTURE AROUND INTERNAL PROSTHETIC KNEE JOINT: Status: ACTIVE | Noted: 2022-01-31

## 2023-08-30 PROBLEM — D62 ACUTE BLOOD LOSS ANEMIA: Status: ACTIVE | Noted: 2022-02-01

## 2023-08-30 PROBLEM — T83.511D URINARY TRACT INFECTION ASSOCIATED WITH CATHETERIZATION OF URINARY TRACT, SUBSEQUENT ENCOUNTER: Status: RESOLVED | Noted: 2023-03-11 | Resolved: 2023-08-30

## 2023-08-30 PROBLEM — U07.1 DISEASE DUE TO SEVERE ACUTE RESPIRATORY SYNDROME CORONAVIRUS 2 (SARS-COV-2): Status: RESOLVED | Noted: 2022-02-01 | Resolved: 2023-08-30

## 2023-08-30 PROBLEM — J20.9 ACUTE BRONCHITIS: Status: ACTIVE | Noted: 2023-08-30

## 2023-08-30 PROBLEM — E66.9 OBESITY, CLASS II, BMI 35-39.9: Status: ACTIVE | Noted: 2022-02-02

## 2023-08-30 PROBLEM — R51.9 ACUTE HEADACHE: Status: ACTIVE | Noted: 2023-08-30

## 2023-08-30 PROBLEM — N39.0 CATHETER-ASSOCIATED URINARY TRACT INFECTION (CMS-HCC): Status: ACTIVE | Noted: 2023-08-30

## 2023-08-30 PROBLEM — J95.2 ACUTE POSTOPERATIVE PULMONARY INSUFFICIENCY (CMS-HCC): Status: ACTIVE | Noted: 2022-02-01

## 2023-08-30 PROBLEM — R53.83 FATIGUE: Status: RESOLVED | Noted: 2023-03-11 | Resolved: 2023-08-30

## 2023-08-30 PROBLEM — Z96.659 PERIPROSTHETIC FRACTURE AROUND INTERNAL PROSTHETIC KNEE JOINT: Status: ACTIVE | Noted: 2022-01-31

## 2023-08-30 PROBLEM — N39.0 URINARY TRACT INFECTION ASSOCIATED WITH CATHETERIZATION OF URINARY TRACT, SUBSEQUENT ENCOUNTER: Status: RESOLVED | Noted: 2023-03-11 | Resolved: 2023-08-30

## 2023-08-30 PROBLEM — J20.9 ACUTE BRONCHITIS: Status: RESOLVED | Noted: 2023-08-30 | Resolved: 2023-08-30

## 2023-08-30 PROBLEM — G89.18 ACUTE POSTOPERATIVE PAIN: Status: ACTIVE | Noted: 2022-02-01

## 2023-08-30 PROBLEM — E66.812 OBESITY, CLASS II, BMI 35-39.9: Status: ACTIVE | Noted: 2022-02-02

## 2023-08-30 PROBLEM — T83.511A CATHETER-ASSOCIATED URINARY TRACT INFECTION (CMS-HCC): Status: ACTIVE | Noted: 2023-08-30

## 2023-08-30 PROCEDURE — 3078F DIAST BP <80 MM HG: CPT | Performed by: PEDIATRICS

## 2023-08-30 PROCEDURE — 99214 OFFICE O/P EST MOD 30 MIN: CPT | Performed by: PEDIATRICS

## 2023-08-30 PROCEDURE — 1160F RVW MEDS BY RX/DR IN RCRD: CPT | Performed by: PEDIATRICS

## 2023-08-30 PROCEDURE — 1159F MED LIST DOCD IN RCRD: CPT | Performed by: PEDIATRICS

## 2023-08-30 PROCEDURE — 1036F TOBACCO NON-USER: CPT | Performed by: PEDIATRICS

## 2023-08-30 PROCEDURE — 4010F ACE/ARB THERAPY RXD/TAKEN: CPT | Performed by: PEDIATRICS

## 2023-08-30 PROCEDURE — 1125F AMNT PAIN NOTED PAIN PRSNT: CPT | Performed by: PEDIATRICS

## 2023-08-30 PROCEDURE — 3077F SYST BP >= 140 MM HG: CPT | Performed by: PEDIATRICS

## 2023-08-30 PROCEDURE — 3008F BODY MASS INDEX DOCD: CPT | Performed by: PEDIATRICS

## 2023-08-30 RX ORDER — FLUTICASONE PROPIONATE AND SALMETEROL 250; 50 UG/1; UG/1
1 POWDER RESPIRATORY (INHALATION) 2 TIMES DAILY
COMMUNITY
Start: 2015-07-13 | End: 2023-08-30 | Stop reason: ALTCHOICE

## 2023-08-30 RX ORDER — FLUTICASONE FUROATE, UMECLIDINIUM BROMIDE AND VILANTEROL TRIFENATATE 200; 62.5; 25 UG/1; UG/1; UG/1
1 POWDER RESPIRATORY (INHALATION)
Qty: 1 EACH | Refills: 11 | Status: SHIPPED | OUTPATIENT
Start: 2023-08-30 | End: 2024-02-14 | Stop reason: ALTCHOICE

## 2023-08-30 RX ORDER — HYDROCHLOROTHIAZIDE 25 MG/1
TABLET ORAL
COMMUNITY
End: 2024-05-22 | Stop reason: ALTCHOICE

## 2023-08-30 RX ORDER — DOCUSATE SODIUM 100 MG/1
CAPSULE, LIQUID FILLED ORAL
COMMUNITY
End: 2023-08-30 | Stop reason: ALTCHOICE

## 2023-08-30 RX ORDER — BENZONATATE 200 MG/1
CAPSULE ORAL
COMMUNITY
End: 2023-08-30 | Stop reason: ALTCHOICE

## 2023-08-30 RX ORDER — CLONIDINE 0.3 MG/24H
PATCH, EXTENDED RELEASE TRANSDERMAL
Qty: 90 PATCH | Refills: 0 | Status: SHIPPED | OUTPATIENT
Start: 2023-08-30 | End: 2023-09-09 | Stop reason: ALTCHOICE

## 2023-08-30 RX ORDER — OXYCODONE HYDROCHLORIDE 5 MG/1
TABLET ORAL
COMMUNITY
End: 2023-08-30 | Stop reason: ALTCHOICE

## 2023-08-30 RX ORDER — DOXYCYCLINE 100 MG/1
CAPSULE ORAL
COMMUNITY
End: 2023-08-30 | Stop reason: ALTCHOICE

## 2023-08-30 RX ORDER — OFLOXACIN 3 MG/ML
SOLUTION/ DROPS OPHTHALMIC
COMMUNITY
End: 2023-08-30 | Stop reason: ALTCHOICE

## 2023-08-30 RX ORDER — TAMSULOSIN HYDROCHLORIDE 0.4 MG/1
CAPSULE ORAL
COMMUNITY
End: 2023-08-30 | Stop reason: ALTCHOICE

## 2023-08-30 RX ORDER — OXYBUTYNIN CHLORIDE 10 MG/1
TABLET, EXTENDED RELEASE ORAL
COMMUNITY
End: 2023-08-30 | Stop reason: SDUPTHER

## 2023-08-30 RX ORDER — GUAIFENESIN 600 MG/1
600 TABLET, EXTENDED RELEASE ORAL 2 TIMES DAILY
COMMUNITY
Start: 2015-07-13 | End: 2023-08-30 | Stop reason: ALTCHOICE

## 2023-08-30 RX ORDER — AMMONIUM LACTATE 12 G/100G
LOTION TOPICAL
COMMUNITY
End: 2023-08-30 | Stop reason: ALTCHOICE

## 2023-08-30 RX ORDER — PEN NEEDLE, DIABETIC 31 GX5/16"
NEEDLE, DISPOSABLE MISCELLANEOUS
COMMUNITY
Start: 2016-12-05 | End: 2023-08-30 | Stop reason: ALTCHOICE

## 2023-08-30 RX ORDER — TRAMADOL HYDROCHLORIDE 50 MG/1
TABLET ORAL
COMMUNITY
End: 2023-08-30 | Stop reason: ALTCHOICE

## 2023-08-30 RX ORDER — ASPIRIN 81 MG/1
81 TABLET ORAL 2 TIMES DAILY
COMMUNITY
Start: 2022-02-05 | End: 2023-08-30 | Stop reason: ALTCHOICE

## 2023-08-30 RX ORDER — FOLIC ACID 1 MG/1
TABLET ORAL
COMMUNITY
End: 2024-02-14 | Stop reason: ALTCHOICE

## 2023-08-30 RX ORDER — IPRATROPIUM BROMIDE AND ALBUTEROL SULFATE 2.5; .5 MG/3ML; MG/3ML
3 SOLUTION RESPIRATORY (INHALATION) EVERY 4 HOURS PRN
COMMUNITY
Start: 2015-07-13 | End: 2024-02-14 | Stop reason: ALTCHOICE

## 2023-08-30 RX ORDER — AZITHROMYCIN 250 MG/1
TABLET, FILM COATED ORAL
COMMUNITY
End: 2023-08-30 | Stop reason: ALTCHOICE

## 2023-08-30 RX ORDER — AMLODIPINE BESYLATE 10 MG/1
TABLET ORAL
COMMUNITY
End: 2024-02-14 | Stop reason: ALTCHOICE

## 2023-08-30 RX ORDER — HYDROXYZINE HYDROCHLORIDE 10 MG/1
10 TABLET, FILM COATED ORAL EVERY 6 HOURS PRN
COMMUNITY
Start: 2023-06-29 | End: 2023-08-30 | Stop reason: ALTCHOICE

## 2023-08-30 RX ORDER — NALOXONE HYDROCHLORIDE 4 MG/.1ML
SPRAY NASAL
COMMUNITY
Start: 2022-02-05 | End: 2023-08-30 | Stop reason: ALTCHOICE

## 2023-08-30 RX ORDER — OXYCODONE HYDROCHLORIDE 5 MG/1
5 TABLET ORAL EVERY 4 HOURS PRN
COMMUNITY
Start: 2023-07-06 | End: 2023-08-30 | Stop reason: ALTCHOICE

## 2023-08-30 RX ORDER — OXYBUTYNIN CHLORIDE 10 MG/1
TABLET, EXTENDED RELEASE ORAL
Qty: 90 TABLET | Refills: 1 | Status: SHIPPED | OUTPATIENT
Start: 2023-08-30 | End: 2023-09-04 | Stop reason: SDUPTHER

## 2023-08-30 RX ORDER — UBIDECARENONE 75 MG
1000 CAPSULE ORAL
COMMUNITY

## 2023-08-30 RX ORDER — TIRZEPATIDE 2.5 MG/.5ML
2.5 INJECTION, SOLUTION SUBCUTANEOUS
Qty: 2 ML | Refills: 1 | Status: SHIPPED | OUTPATIENT
Start: 2023-08-30 | End: 2023-09-09 | Stop reason: ALTCHOICE

## 2023-08-30 RX ORDER — CYANOCOBALAMIN 1000 UG/ML
INJECTION, SOLUTION INTRAMUSCULAR; SUBCUTANEOUS
COMMUNITY
End: 2023-08-30 | Stop reason: ALTCHOICE

## 2023-08-30 RX ORDER — ACETAMINOPHEN 500 MG
TABLET ORAL EVERY 6 HOURS
COMMUNITY

## 2023-08-30 RX ORDER — DOXYCYCLINE HYCLATE 100 MG
TABLET ORAL
COMMUNITY
End: 2023-08-30 | Stop reason: ALTCHOICE

## 2023-08-30 ASSESSMENT — PAIN SCALES - GENERAL: PAINLEVEL: 4

## 2023-08-30 NOTE — PROGRESS NOTES
"Subjective   Patient ID: Elliot Larson is a 73 y.o. male who presents for Hypertension, Hyperlipidemia, and Diabetes.    HPI   Fell and broke hip 6/24/2023; was in the hospital for a week and rehab for 4 weeks; walking with walker; broke hip, knee, femur and vertebrae with 4 falls  HTN- does not take bp at home; cuff broke  DM- does not check glucose  Last BMP 7/2022- glucose 137; chloride 109   Last A1c 7/2022- 6.0  Vitamin D was low at 25 and calcium low at 8.1; he was discharged last admission in June with vitamin D2 50,000 twice a week and now receives vitamin D3 4000 units twice  a day.  Had a \"sliver\" of cheesecake at 5am- unsure about doing labs today  Colonoscopy due 2025  Diet- breakfast: coffee with cream; Lunch: protein, vegetable low salt; dinner: pizza; snack: popcorn, peanuts  Worried about falls and breaking bones has made changes in house such as getting rid of loose rugs; walks with a walker    Review of Systems  No CP or SOB  Urinary urgency  Objective   /72 (BP Location: Left arm, Patient Position: Sitting, BP Cuff Size: Adult)   Pulse 60   Temp 36.3 °C (97.3 °F) (Temporal)   Resp 16   Ht 1.727 m (5' 8\")   Wt 93.9 kg (207 lb)   SpO2 99%   BMI 31.47 kg/m²     Physical Exam  HEENT ok  Lungs--mild wheeze  Heart reg  Feet ok  Assessment/Plan   Problem List Items Addressed This Visit       Asthma    Relevant Medications    fluticasone-umeclidin-vilanter (Trelegy Ellipta) 200-62.5-25 mcg blister with device    Depression    Dyslipidemia    Relevant Orders    Lipid Panel    Thyroid Stimulating Hormone    Hypertension - Primary    Relevant Medications    cloNIDine (Catapres-TTS-3) 0.3 mg/24 hr patch    Pancreatic abnormality     Will see gastroenterology soon         Obesity    Relevant Orders    Comprehensive Metabolic Panel    RESOLVED: Obstructive sleep apnea, adult    PSA elevation     Follows with Dr Gant         Relevant Orders    Prostate Specific Antigen    Vitamin D deficiency    " Esophageal reflux    Diabetes mellitus (CMS/[facility])    Relevant Orders    Hemoglobin A1C    Vitamin B12     Other Visit Diagnoses       History of recurrent fracture of vertebra        Relevant Orders    XR DEXA bone density    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

## 2023-08-31 DIAGNOSIS — F32.A DEPRESSION, UNSPECIFIED DEPRESSION TYPE: ICD-10-CM

## 2023-08-31 DIAGNOSIS — N32.81 OVERACTIVE BLADDER: ICD-10-CM

## 2023-09-04 DIAGNOSIS — E11.9 TYPE 2 DIABETES MELLITUS WITHOUT COMPLICATION, WITHOUT LONG-TERM CURRENT USE OF INSULIN (MULTI): Primary | ICD-10-CM

## 2023-09-04 DIAGNOSIS — N32.81 OVERACTIVE BLADDER: ICD-10-CM

## 2023-09-04 DIAGNOSIS — I10 HYPERTENSION, UNSPECIFIED TYPE: ICD-10-CM

## 2023-09-04 RX ORDER — OXYBUTYNIN CHLORIDE 10 MG/1
TABLET, EXTENDED RELEASE ORAL
Qty: 90 TABLET | Refills: 1 | Status: SHIPPED | OUTPATIENT
Start: 2023-09-04 | End: 2024-03-07

## 2023-09-06 DIAGNOSIS — I10 HYPERTENSION, UNSPECIFIED TYPE: Primary | ICD-10-CM

## 2023-09-06 DIAGNOSIS — M51.36 DISC DEGENERATION, LUMBAR: ICD-10-CM

## 2023-09-06 DIAGNOSIS — J45.909 ASTHMA, UNSPECIFIED ASTHMA SEVERITY, UNSPECIFIED WHETHER COMPLICATED, UNSPECIFIED WHETHER PERSISTENT (HHS-HCC): ICD-10-CM

## 2023-09-06 DIAGNOSIS — E78.5 DYSLIPIDEMIA: ICD-10-CM

## 2023-09-06 DIAGNOSIS — K21.9 GASTROESOPHAGEAL REFLUX DISEASE WITHOUT ESOPHAGITIS: ICD-10-CM

## 2023-09-06 DIAGNOSIS — Z00.00 HEALTHCARE MAINTENANCE: ICD-10-CM

## 2023-09-08 ENCOUNTER — LAB (OUTPATIENT)
Dept: LAB | Facility: LAB | Age: 73
End: 2023-09-08
Payer: MEDICARE

## 2023-09-08 DIAGNOSIS — E66.09 CLASS 1 OBESITY DUE TO EXCESS CALORIES WITH SERIOUS COMORBIDITY AND BODY MASS INDEX (BMI) OF 31.0 TO 31.9 IN ADULT: ICD-10-CM

## 2023-09-08 DIAGNOSIS — R53.83 OTHER FATIGUE: ICD-10-CM

## 2023-09-08 DIAGNOSIS — R97.20 PSA ELEVATION: ICD-10-CM

## 2023-09-08 DIAGNOSIS — Z98.84 HISTORY OF GASTRIC BYPASS: ICD-10-CM

## 2023-09-08 DIAGNOSIS — E78.5 DYSLIPIDEMIA: ICD-10-CM

## 2023-09-08 DIAGNOSIS — E11.9 TYPE 2 DIABETES MELLITUS WITHOUT COMPLICATION, WITHOUT LONG-TERM CURRENT USE OF INSULIN (MULTI): ICD-10-CM

## 2023-09-08 DIAGNOSIS — M84.68XA PATHOLOGICAL FRACTURE IN OTHER DISEASE, OTHER SITE, INITIAL ENCOUNTER FOR FRACTURE: ICD-10-CM

## 2023-09-08 DIAGNOSIS — E55.9 VITAMIN D DEFICIENCY: ICD-10-CM

## 2023-09-08 DIAGNOSIS — E78.5 HYPERLIPIDEMIA, UNSPECIFIED HYPERLIPIDEMIA TYPE: ICD-10-CM

## 2023-09-08 DIAGNOSIS — E11.9 DIABETES MELLITUS WITHOUT OPHTHALMIC MANIFESTATIONS (MULTI): ICD-10-CM

## 2023-09-08 DIAGNOSIS — Z12.5 SCREENING PSA (PROSTATE SPECIFIC ANTIGEN): ICD-10-CM

## 2023-09-08 DIAGNOSIS — M89.9 DISORDER OF BONE, UNSPECIFIED: ICD-10-CM

## 2023-09-08 LAB
ALANINE AMINOTRANSFERASE (SGPT) (U/L) IN SER/PLAS: 15 U/L (ref 10–52)
ALBUMIN (G/DL) IN SER/PLAS: 3.8 G/DL (ref 3.4–5)
ALKALINE PHOSPHATASE (U/L) IN SER/PLAS: 110 U/L (ref 33–136)
ANION GAP IN SER/PLAS: 13 MMOL/L (ref 10–20)
ASPARTATE AMINOTRANSFERASE (SGOT) (U/L) IN SER/PLAS: 16 U/L (ref 9–39)
BILIRUBIN TOTAL (MG/DL) IN SER/PLAS: 0.8 MG/DL (ref 0–1.2)
CALCIDIOL (25 OH VITAMIN D3) (NG/ML) IN SER/PLAS: 37 NG/ML
CALCIUM (MG/DL) IN SER/PLAS: 9.2 MG/DL (ref 8.6–10.3)
CARBON DIOXIDE, TOTAL (MMOL/L) IN SER/PLAS: 24 MMOL/L (ref 21–32)
CHLORIDE (MMOL/L) IN SER/PLAS: 107 MMOL/L (ref 98–107)
CHOLESTEROL (MG/DL) IN SER/PLAS: 131 MG/DL (ref 0–199)
CHOLESTEROL IN HDL (MG/DL) IN SER/PLAS: 85.4 MG/DL
CHOLESTEROL/HDL RATIO: 1.5
COBALAMIN (VITAMIN B12) (PG/ML) IN SER/PLAS: 1944 PG/ML (ref 211–911)
CREATININE (MG/DL) IN SER/PLAS: 0.98 MG/DL (ref 0.5–1.3)
GFR MALE: 81 ML/MIN/1.73M2
GLUCOSE (MG/DL) IN SER/PLAS: 124 MG/DL (ref 74–99)
LDL: 34 MG/DL (ref 0–99)
PHOSPHATE (MG/DL) IN SER/PLAS: 3.6 MG/DL (ref 2.5–4.9)
POTASSIUM (MMOL/L) IN SER/PLAS: 3.7 MMOL/L (ref 3.5–5.3)
PROSTATE SPECIFIC AG (NG/ML) IN SER/PLAS: 1.54 NG/ML (ref 0–4)
PROTEIN TOTAL: 6.7 G/DL (ref 6.4–8.2)
SODIUM (MMOL/L) IN SER/PLAS: 140 MMOL/L (ref 136–145)
THYROTROPIN (MIU/L) IN SER/PLAS BY DETECTION LIMIT <= 0.05 MIU/L: 2.09 MIU/L (ref 0.44–3.98)
TRIGLYCERIDE (MG/DL) IN SER/PLAS: 59 MG/DL (ref 0–149)
UREA NITROGEN (MG/DL) IN SER/PLAS: 22 MG/DL (ref 6–23)
VLDL: 12 MG/DL (ref 0–40)

## 2023-09-08 PROCEDURE — 82306 VITAMIN D 25 HYDROXY: CPT

## 2023-09-08 PROCEDURE — 80061 LIPID PANEL: CPT

## 2023-09-08 PROCEDURE — 83036 HEMOGLOBIN GLYCOSYLATED A1C: CPT

## 2023-09-08 PROCEDURE — G0103 PSA SCREENING: HCPCS

## 2023-09-08 PROCEDURE — 80053 COMPREHEN METABOLIC PANEL: CPT

## 2023-09-08 PROCEDURE — 36415 COLL VENOUS BLD VENIPUNCTURE: CPT

## 2023-09-08 PROCEDURE — 84443 ASSAY THYROID STIM HORMONE: CPT

## 2023-09-08 PROCEDURE — 82607 VITAMIN B-12: CPT

## 2023-09-08 PROCEDURE — 84100 ASSAY OF PHOSPHORUS: CPT

## 2023-09-09 RX ORDER — ATORVASTATIN CALCIUM 10 MG/1
10 TABLET, FILM COATED ORAL DAILY
Qty: 90 TABLET | Refills: 1 | Status: SHIPPED | OUTPATIENT
Start: 2023-09-09 | End: 2024-01-02 | Stop reason: ALTCHOICE

## 2023-09-09 RX ORDER — PANTOPRAZOLE SODIUM 20 MG/1
20 TABLET, DELAYED RELEASE ORAL DAILY
Qty: 90 TABLET | Refills: 1 | Status: SHIPPED | OUTPATIENT
Start: 2023-09-09 | End: 2024-05-25

## 2023-09-09 RX ORDER — CLONIDINE HYDROCHLORIDE 0.2 MG/1
0.2 TABLET ORAL 2 TIMES DAILY
Qty: 180 TABLET | OUTPATIENT
Start: 2023-09-09

## 2023-09-09 RX ORDER — TIRZEPATIDE 5 MG/.5ML
5 INJECTION, SOLUTION SUBCUTANEOUS
Qty: 4 ML | Refills: 0 | Status: SHIPPED | OUTPATIENT
Start: 2023-09-09 | End: 2024-01-02 | Stop reason: ALTCHOICE

## 2023-09-09 RX ORDER — ACYCLOVIR 800 MG/1
800 TABLET ORAL DAILY
Qty: 90 TABLET | Refills: 1 | Status: SHIPPED | OUTPATIENT
Start: 2023-09-09 | End: 2024-04-30

## 2023-09-09 RX ORDER — LISINOPRIL 40 MG/1
40 TABLET ORAL DAILY
Qty: 90 TABLET | Refills: 1 | Status: SHIPPED | OUTPATIENT
Start: 2023-09-09 | End: 2024-05-16

## 2023-09-09 RX ORDER — MONTELUKAST SODIUM 10 MG/1
10 TABLET ORAL DAILY
Qty: 90 TABLET | Refills: 1 | Status: SHIPPED | OUTPATIENT
Start: 2023-09-09 | End: 2024-04-30

## 2023-09-09 RX ORDER — TIZANIDINE 4 MG/1
4 TABLET ORAL 3 TIMES DAILY
Qty: 270 TABLET | Refills: 1 | Status: SHIPPED | OUTPATIENT
Start: 2023-09-09 | End: 2024-04-30

## 2023-09-09 RX ORDER — CLONIDINE HYDROCHLORIDE 0.3 MG/1
0.3 TABLET ORAL 2 TIMES DAILY
Qty: 180 TABLET | Refills: 0 | Status: SHIPPED | OUTPATIENT
Start: 2023-09-09 | End: 2023-12-21 | Stop reason: SDUPTHER

## 2023-09-13 LAB
ESTIMATED AVERAGE GLUCOSE FOR HBA1C: 126 MG/DL
HEMOGLOBIN A1C/HEMOGLOBIN TOTAL IN BLOOD: 6 %

## 2023-09-13 RX ORDER — OXYBUTYNIN CHLORIDE 10 MG/1
TABLET, EXTENDED RELEASE ORAL
Qty: 90 TABLET | Refills: 1 | OUTPATIENT
Start: 2023-09-13

## 2023-09-13 RX ORDER — DULOXETIN HYDROCHLORIDE 30 MG/1
30 CAPSULE, DELAYED RELEASE ORAL DAILY
Qty: 30 CAPSULE | Refills: 2 | Status: SHIPPED | OUTPATIENT
Start: 2023-09-13 | End: 2023-12-21 | Stop reason: SDUPTHER

## 2023-10-09 ENCOUNTER — TELEPHONE (OUTPATIENT)
Dept: PRIMARY CARE | Facility: CLINIC | Age: 73
End: 2023-10-09
Payer: MEDICARE

## 2023-10-09 DIAGNOSIS — Z87.81 S/P LEFT HIP FRACTURE: Primary | ICD-10-CM

## 2023-10-09 NOTE — TELEPHONE ENCOUNTER
, Pt. Left a voicemail message saying he needs a referral for PT for [facility] broken his hip in June in home PT is finished need referral to continue Pt. If with questions call Pt. Back at [phone].

## 2023-10-25 ENCOUNTER — TELEPHONE (OUTPATIENT)
Dept: GASTROENTEROLOGY | Facility: CLINIC | Age: 73
End: 2023-10-25
Payer: MEDICARE

## 2023-10-25 ENCOUNTER — TELEPHONE (OUTPATIENT)
Dept: PRIMARY CARE | Facility: CLINIC | Age: 73
End: 2023-10-25
Payer: MEDICARE

## 2023-10-25 DIAGNOSIS — K86.9 LESION OF PANCREAS (HHS-HCC): Primary | ICD-10-CM

## 2023-10-25 NOTE — TELEPHONE ENCOUNTER
Patient left message on line that he is due for her MRI of his Pancreas that Dr. Arevalo orders for him.

## 2023-11-06 ENCOUNTER — APPOINTMENT (OUTPATIENT)
Dept: PHYSICAL THERAPY | Facility: CLINIC | Age: 73
End: 2023-11-06
Payer: MEDICARE

## 2023-11-06 DIAGNOSIS — L29.9 GENERALIZED PRURITUS: Primary | ICD-10-CM

## 2023-11-06 DIAGNOSIS — D64.9 ANEMIA, UNSPECIFIED TYPE: Primary | ICD-10-CM

## 2023-11-13 ENCOUNTER — APPOINTMENT (OUTPATIENT)
Dept: HEMATOLOGY/ONCOLOGY | Facility: CLINIC | Age: 73
End: 2023-11-13
Payer: MEDICARE

## 2023-11-13 ENCOUNTER — LAB (OUTPATIENT)
Dept: LAB | Facility: CLINIC | Age: 73
End: 2023-11-13
Payer: MEDICARE

## 2023-11-13 DIAGNOSIS — D53.9 MACROCYTIC ANEMIA: ICD-10-CM

## 2023-11-13 LAB
BASOPHILS # BLD AUTO: 0.02 X10*3/UL (ref 0–0.1)
BASOPHILS NFR BLD AUTO: 0.4 %
EOSINOPHIL # BLD AUTO: 0.22 X10*3/UL (ref 0–0.4)
EOSINOPHIL NFR BLD AUTO: 4 %
ERYTHROCYTE [DISTWIDTH] IN BLOOD BY AUTOMATED COUNT: 12.9 % (ref 11.5–14.5)
FERRITIN SERPL-MCNC: 43 NG/ML (ref 20–300)
HCT VFR BLD AUTO: 43.6 % (ref 41–52)
HGB BLD-MCNC: 14.5 G/DL (ref 13.5–17.5)
IMM GRANULOCYTES # BLD AUTO: 0.01 X10*3/UL (ref 0–0.5)
IMM GRANULOCYTES NFR BLD AUTO: 0.2 % (ref 0–0.9)
IRON SATN MFR SERPL: 36 % (ref 25–45)
IRON SERPL-MCNC: 130 UG/DL (ref 35–150)
LYMPHOCYTES # BLD AUTO: 1.6 X10*3/UL (ref 0.8–3)
LYMPHOCYTES NFR BLD AUTO: 29.1 %
MCH RBC QN AUTO: 33.9 PG (ref 26–34)
MCHC RBC AUTO-ENTMCNC: 33.3 G/DL (ref 32–36)
MCV RBC AUTO: 102 FL (ref 80–100)
MONOCYTES # BLD AUTO: 0.36 X10*3/UL (ref 0.05–0.8)
MONOCYTES NFR BLD AUTO: 6.5 %
NEUTROPHILS # BLD AUTO: 3.29 X10*3/UL (ref 1.6–5.5)
NEUTROPHILS NFR BLD AUTO: 59.8 %
PLATELET # BLD AUTO: 195 X10*3/UL (ref 150–450)
RBC # BLD AUTO: 4.28 X10*6/UL (ref 4.5–5.9)
TIBC SERPL-MCNC: 362 UG/DL (ref 240–445)
UIBC SERPL-MCNC: 232 UG/DL (ref 110–370)
VIT B12 SERPL-MCNC: 251 PG/ML (ref 211–911)
WBC # BLD AUTO: 5.5 X10*3/UL (ref 4.4–11.3)

## 2023-11-13 PROCEDURE — 83550 IRON BINDING TEST: CPT

## 2023-11-13 PROCEDURE — 82607 VITAMIN B-12: CPT

## 2023-11-13 PROCEDURE — 36415 COLL VENOUS BLD VENIPUNCTURE: CPT

## 2023-11-13 PROCEDURE — 82728 ASSAY OF FERRITIN: CPT

## 2023-11-13 PROCEDURE — 85025 COMPLETE CBC W/AUTO DIFF WBC: CPT

## 2023-11-13 NOTE — PROGRESS NOTES
Patient ID: Elliot Larson is a 73 y.o. male.      Subjective    HPI    Mr. Larson is a 71 y/o M with a history of hypertension, diabetes, recurrent UTIs, gastric bypass and BPH who presents for follow-up for anemia.     Reviewing patient's records patient is noted to have an anemia since at least every 2018 when his hemoglobin was 13. More recently he is noted to have a hemoglobin of 12.1. He denies any obvious blood loss in the stool or urine. States colonoscopy which  was completed last in November 2015 was normal. She also underwent an EGD at that time which was also normal. Patient underwent hernia repair in September 2019 and denies any significant blood loss in need of blood transfusions. Initial visit 12/4/19  started on vitamin B12 supplements.      Interval Events:      2/20/23: Patient presents for follow-up for anemia. Blood work today shows a hemoglobin of 12.6 with no other cytopenias. Hemoglobin has been stable for some time. Patient has had several falls. He reports depression related to personal issues and he  would like a referral to a psychologist.        11/14/23: Labs done yesterday show WBC 4.6, hgb 13.9, plt 153,000, . Has had macrycytosis since at least Dec of 2020.  Since last visit patient had a fall and broke his hip in June 2023, he was hospitalized and had surgery followed by a stay at a rehab facility. He reports he is now feeling very well and energy is good. He denies signs or symptoms of bleeding other than mild with oral care at times. He is taking Monjuro so his appetite is decreased. He denies infections, fevers, chills, GI symptoms, SOB, chest pain or edema. He is walking with a walker and enjoys spending time with his dog and has a job interview tomorrow. He gets lunch delivered daily. He is due for pancreas MRI and last PCP visit was in August, due for colonoscopy in 2025.     ROS 14 points performed, See HPI for exceptions       PMHx: hypertension, diabetes,  recurrent UTIs and BPH, pancreatic cyst     PSHx: gastric bypass     Social Hx: Patient is a former smoker. Denies any illicit drug use. Patient is single. He has no children.      Objective    Visit Vitals  /74   Pulse 80   Temp 36.2 °C (97.2 °F)   Resp 16   Wt 94 kg (207 lb 3.7 oz)   SpO2 97%   BMI 31.51 kg/m²   Smoking Status Former   BSA 2.12 m²          Physical Exam  Vitals reviewed.   Constitutional:       Appearance: Normal appearance.   HENT:      Head: Normocephalic and atraumatic.      Nose: Nose normal.      Mouth/Throat:      Mouth: Mucous membranes are moist.      Pharynx: Oropharynx is clear.   Eyes:      Extraocular Movements: Extraocular movements intact.      Conjunctiva/sclera: Conjunctivae normal.      Pupils: Pupils are equal, round, and reactive to light.   Cardiovascular:      Rate and Rhythm: Normal rate and regular rhythm.      Pulses: Normal pulses.      Heart sounds: Normal heart sounds.   Pulmonary:      Effort: Pulmonary effort is normal.      Breath sounds: Normal breath sounds.   Abdominal:      General: Bowel sounds are normal.      Palpations: Abdomen is soft.   Musculoskeletal:         General: Normal range of motion.      Cervical back: Normal range of motion.      Comments: Using walker   Skin:     General: Skin is warm and dry.   Neurological:      General: No focal deficit present.      Mental Status: He is alert and oriented to person, place, and time.   Psychiatric:         Mood and Affect: Mood normal.         Behavior: Behavior normal.         Thought Content: Thought content normal.         Judgment: Judgment normal.         Performance Status:  Asymptomatic    Labs:  Lab Results   Component Value Date    WBC 5.5 11/13/2023    NEUTROABS 3.29 11/13/2023    IGABSOL 0.01 11/13/2023    LYMPHSABS 1.60 11/13/2023    MONOSABS 0.36 11/13/2023    EOSABS 0.22 11/13/2023    BASOSABS 0.02 11/13/2023    RBC 4.28 (L) 11/13/2023     (H) 11/13/2023    MCHC 33.3 11/13/2023     HGB 14.5 11/13/2023    HCT 43.6 11/13/2023     11/13/2023     Lab Results   Component Value Date    CREATININE 0.98 09/08/2023    BUN 22 09/08/2023     09/08/2023    K 3.7 09/08/2023     09/08/2023    CO2 24 09/08/2023      Lab Results   Component Value Date    ALT 15 09/08/2023    AST 16 09/08/2023    ALKPHOS 110 09/08/2023    BILITOT 0.8 09/08/2023      Lab Results   Component Value Date    IRON 130 11/13/2023    TIBC 362 11/13/2023    FERRITIN 43 11/13/2023      Lab Results   Component Value Date    WVDVSEDK89 251 11/13/2023      Lab Results   Component Value Date    FOLATE 9.2 02/09/2022               Assessment/Plan     1. Macrocytic anemia  2. History of gastric bypass.      Pt has had some component of anemia since at least every 2018.  Discussed macrocytic anemia is often associated with folate and vitamin B12 deficiency. Started on vitamin B12 last visit and has received 6 1000mcg shots and one today.      - Most recent Hgb from 2/9/2022 while he was at the nursing facility was 8.9, likely related to post-op bleed from left knee replacement   - Hgb today is up to 12.5, however he feels significantly more fatigue and SOB, depressed mood, likely related to iron deficiency  - Will wait iron studies today with iron, TIBC, ferritin   - We will call him either today or tomorrow when those return if more IV iron is needed. If so, will plan to administer Venofer x3 and follow-up in 3 months      2/20/23:   - Hemoglobin at goal today.   - No evidence of new cytopenias.   - Discussed that he could follow with his PCP or us and he elected to follow with us. We will continue to follow for anemia.   - RTC in 6 months with labs day of.     11/14/2023:   - WBC 4.6, hgb 13.9, plt 153,000, , iron 130, ferritin 43, iron saturation 36%, vitamin B12 251  - Not taking oral B12 supplements, will increase through diet for now  - Not on oral tab supplementation and with normal hgb and iron panel he will  continue with a well balanced diet  - He is feeling well and prefers to return in 3 months for repeat labs and FUV (CBCd, CMP, vitamin b12, iron panel/ferritin)  - He will call if any changes or concerns in the meantime      3  Pancreatic lesion  - Followed by Dr. Arevalo  - Patient underwent MRI in 11/2020 and no pancreatic lesions seen        Diagnoses and all orders for this visit:  Thrombocytopenia (CMS/HCC)  -     Clinic Appointment Request; Future  -     Comprehensive Metabolic Panel; Future  -     CBC and Auto Differential; Future  -     Iron and TIBC; Future  -     Vitamin B12; Future  Macrocytic anemia  -     Comprehensive Metabolic Panel; Future  -     CBC and Auto Differential; Future  -     Iron and TIBC; Future  -     Vitamin B12; Future       Manny Chandra MSN, APRN, FNP-C  [facility]  Division of Hematology & Medical Oncology   Collaborating Physician Dr. Janay Lino  [facility] [place] [address]  Phone: [phone]  Available via Secure Chat

## 2023-11-14 ENCOUNTER — OFFICE VISIT (OUTPATIENT)
Dept: HEMATOLOGY/ONCOLOGY | Facility: CLINIC | Age: 73
End: 2023-11-14
Payer: MEDICARE

## 2023-11-14 ENCOUNTER — TELEPHONE (OUTPATIENT)
Dept: PRIMARY CARE | Facility: CLINIC | Age: 73
End: 2023-11-14
Payer: MEDICARE

## 2023-11-14 VITALS
HEART RATE: 80 BPM | WEIGHT: 207.23 LBS | SYSTOLIC BLOOD PRESSURE: 147 MMHG | TEMPERATURE: 97.2 F | OXYGEN SATURATION: 97 % | DIASTOLIC BLOOD PRESSURE: 74 MMHG | BODY MASS INDEX: 31.51 KG/M2 | RESPIRATION RATE: 16 BRPM

## 2023-11-14 DIAGNOSIS — D53.9 MACROCYTIC ANEMIA: ICD-10-CM

## 2023-11-14 DIAGNOSIS — D69.6 THROMBOCYTOPENIA (CMS-HCC): Primary | ICD-10-CM

## 2023-11-14 PROCEDURE — 3078F DIAST BP <80 MM HG: CPT

## 2023-11-14 PROCEDURE — 99213 OFFICE O/P EST LOW 20 MIN: CPT

## 2023-11-14 PROCEDURE — 3077F SYST BP >= 140 MM HG: CPT

## 2023-11-14 PROCEDURE — 3008F BODY MASS INDEX DOCD: CPT

## 2023-11-14 PROCEDURE — 1159F MED LIST DOCD IN RCRD: CPT

## 2023-11-14 PROCEDURE — 1160F RVW MEDS BY RX/DR IN RCRD: CPT

## 2023-11-14 PROCEDURE — 4010F ACE/ARB THERAPY RXD/TAKEN: CPT

## 2023-11-14 PROCEDURE — 1036F TOBACCO NON-USER: CPT

## 2023-11-14 PROCEDURE — 3044F HG A1C LEVEL LT 7.0%: CPT

## 2023-11-14 PROCEDURE — 1125F AMNT PAIN NOTED PAIN PRSNT: CPT

## 2023-11-14 ASSESSMENT — COLUMBIA-SUICIDE SEVERITY RATING SCALE - C-SSRS
1. IN THE PAST MONTH, HAVE YOU WISHED YOU WERE DEAD OR WISHED YOU COULD GO TO SLEEP AND NOT WAKE UP?: NO
6. HAVE YOU EVER DONE ANYTHING, STARTED TO DO ANYTHING, OR PREPARED TO DO ANYTHING TO END YOUR LIFE?: NO
2. HAVE YOU ACTUALLY HAD ANY THOUGHTS OF KILLING YOURSELF?: NO

## 2023-11-14 ASSESSMENT — ENCOUNTER SYMPTOMS
LOSS OF SENSATION IN FEET: 0
OCCASIONAL FEELINGS OF UNSTEADINESS: 1
DEPRESSION: 0

## 2023-11-14 ASSESSMENT — PATIENT HEALTH QUESTIONNAIRE - PHQ9
2. FEELING DOWN, DEPRESSED OR HOPELESS: NOT AT ALL
SUM OF ALL RESPONSES TO PHQ9 QUESTIONS 1 AND 2: 0
1. LITTLE INTEREST OR PLEASURE IN DOING THINGS: NOT AT ALL

## 2023-11-14 NOTE — TELEPHONE ENCOUNTER
Dr. Corbett, Pt. Left a voicemail message saying he had blood work ordered by his Hematology doctor and the results are back call him with results at [phone].

## 2023-11-27 ENCOUNTER — TELEPHONE (OUTPATIENT)
Dept: PRIMARY CARE | Facility: CLINIC | Age: 73
End: 2023-11-27
Payer: MEDICARE

## 2023-11-27 DIAGNOSIS — Z87.81 S/P LEFT HIP FRACTURE: Primary | ICD-10-CM

## 2023-11-27 RX ORDER — CALCIUM CARBONATE 160(400)MG
TABLET,CHEWABLE ORAL
Qty: 1 EACH | Refills: 0 | Status: SHIPPED | OUTPATIENT
Start: 2023-11-27

## 2023-11-27 NOTE — TELEPHONE ENCOUNTER
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 left a voicemail message saying he's calling to get a Rx for a Rolator so Medicare insurance can pay for it the Rx have to have a diagnoses on there. Pt. Said to call him anytime at [phone].

## 2023-11-30 ENCOUNTER — APPOINTMENT (OUTPATIENT)
Dept: PHYSICAL THERAPY | Facility: CLINIC | Age: 73
End: 2023-11-30
Payer: MEDICARE

## 2023-12-08 ENCOUNTER — APPOINTMENT (OUTPATIENT)
Dept: OPHTHALMOLOGY | Facility: CLINIC | Age: 73
End: 2023-12-08
Payer: MEDICARE

## 2023-12-11 ENCOUNTER — HOSPITAL ENCOUNTER (OUTPATIENT)
Dept: RADIOLOGY | Facility: HOSPITAL | Age: 73
Discharge: HOME | End: 2023-12-11
Payer: MEDICARE

## 2023-12-11 DIAGNOSIS — K86.9 LESION OF PANCREAS (HHS-HCC): ICD-10-CM

## 2023-12-11 PROCEDURE — 2550000001 HC RX 255 CONTRASTS: Performed by: INTERNAL MEDICINE

## 2023-12-11 PROCEDURE — 76377 3D RENDER W/INTRP POSTPROCES: CPT | Performed by: RADIOLOGY

## 2023-12-11 PROCEDURE — A9575 INJ GADOTERATE MEGLUMI 0.1ML: HCPCS | Performed by: INTERNAL MEDICINE

## 2023-12-11 PROCEDURE — 74183 MRI ABD W/O CNTR FLWD CNTR: CPT | Performed by: RADIOLOGY

## 2023-12-11 PROCEDURE — 74183 MRI ABD W/O CNTR FLWD CNTR: CPT

## 2023-12-11 RX ORDER — GADOTERATE MEGLUMINE 376.9 MG/ML
19 INJECTION INTRAVENOUS
Status: COMPLETED | OUTPATIENT
Start: 2023-12-11 | End: 2023-12-11

## 2023-12-11 RX ADMIN — GADOTERATE MEGLUMINE 19 ML: 376.9 INJECTION INTRAVENOUS at 15:56

## 2023-12-12 ENCOUNTER — APPOINTMENT (OUTPATIENT)
Dept: PRIMARY CARE | Facility: CLINIC | Age: 73
End: 2023-12-12
Payer: MEDICARE

## 2023-12-15 ENCOUNTER — TELEPHONE (OUTPATIENT)
Dept: GASTROENTEROLOGY | Facility: HOSPITAL | Age: 73
End: 2023-12-15
Payer: MEDICARE

## 2023-12-15 DIAGNOSIS — R91.1 LUNG NODULE: Primary | ICD-10-CM

## 2023-12-15 NOTE — TELEPHONE ENCOUNTER
Called to speak to patient with results.     Stable pancreatic duct and tail changes, no alarming findings.   Does mention ? New lung nodule, although could be artifact.      He would like to pursue CT chest for assessment.    Ordered, prefers Anish.      Will also need to get MRI results to PCP, Vera Corbett, for ongoing followup of lung finding.

## 2023-12-15 NOTE — TELEPHONE ENCOUNTER
----- Message from Linda Chambers LPN sent at 12/15/2023  3:58 PM EST -----  Regarding: MRCP results  Pt would like to discuss his MRCP results. Please review and advise when able, thanks!

## 2023-12-20 ENCOUNTER — TELEPHONE (OUTPATIENT)
Dept: GASTROENTEROLOGY | Facility: HOSPITAL | Age: 73
End: 2023-12-20
Payer: MEDICARE

## 2023-12-20 NOTE — TELEPHONE ENCOUNTER
Called patient to go over Dr. Arevalo's recommendations to get a CT chest done in January 2024. Patient did not answer. LM with radiology scheduling phone number and my number for any questions.

## 2023-12-21 ENCOUNTER — EVALUATION (OUTPATIENT)
Dept: PHYSICAL THERAPY | Facility: CLINIC | Age: 73
End: 2023-12-21
Payer: MEDICARE

## 2023-12-21 DIAGNOSIS — F32.A DEPRESSION, UNSPECIFIED DEPRESSION TYPE: ICD-10-CM

## 2023-12-21 DIAGNOSIS — Z87.81 S/P LEFT HIP FRACTURE: ICD-10-CM

## 2023-12-21 DIAGNOSIS — I10 HYPERTENSION, UNSPECIFIED TYPE: ICD-10-CM

## 2023-12-21 PROCEDURE — 97110 THERAPEUTIC EXERCISES: CPT | Mod: GP

## 2023-12-21 PROCEDURE — 97162 PT EVAL MOD COMPLEX 30 MIN: CPT | Mod: GP

## 2023-12-21 RX ORDER — CLONIDINE HYDROCHLORIDE 0.3 MG/1
0.3 TABLET ORAL 2 TIMES DAILY
Qty: 180 TABLET | Refills: 0 | Status: SHIPPED | OUTPATIENT
Start: 2023-12-21 | End: 2024-03-07

## 2023-12-21 RX ORDER — DULOXETIN HYDROCHLORIDE 30 MG/1
30 CAPSULE, DELAYED RELEASE ORAL DAILY
Qty: 30 CAPSULE | Refills: 2 | Status: SHIPPED | OUTPATIENT
Start: 2023-12-21

## 2023-12-21 NOTE — PROGRESS NOTES
"Patient Name: Elliot Larson  MRN: [id number]  Today's Date: 12/21/2023  Visit #1    Subjective:  Chief Complaint: Elliot is a 73 y.o. M who presents to therapy c/o global L LE weakness. He has a long history of injuries - including: L distal femoral fracture, L knee TKA (second on his L), and an intertrochanteric fracture of L femur. Elliot also notes a history of cervical and thoracic fractures. He notes that these series of injuries began after he retired in 2018. All incidents have been the result of a fall - with his most recent fall-related injury occurring on 6/24/2023. He received home therapy - which ended in September. Since that time - Elliot notes that he has been \"a couch potato.\" Adds that his main reason for coming to therapy is not pain - but decreased strength and functional mobility. He presents to therapy utilizing a Rollator.    Mental Health Screening: Elliot notes that he is currently receiving mental health services     Elliot lives alone in a condo. He has no stairs to navigate. He is able to take care of all day to day tasks - but notes that it is \"very slow.\"  Pain intensity at rest: 0/10  Pain intensity at worst: 2/10 \"occasionally achy'  Aggravating factors: Long periods of inactivity   PLOF: Prior to fall in 2018 - Elliot was able to complete all activities w/o any limitations  Occupation: Currently retired - but will be starting a new part time position in January.  Therapy Goals: \"Able to walk normally again\"    Objective:  Lumbar ROM: Unable to test d/t fear-avoidance    Hip ROM (L/R)  Flexion: 125°/105°  Abduction: 45°/35°  Extension: 10°/5°  External Rotation: 45°/35°  Internal rotation: 45°/25°    Specific Lower Extremity MMT (L/R)  Iliopsoas: 2+/5, 4/5  Gluteus Zheng (prone): 2+/5, 4/5  Hip External Rotation: 3-/5, 4/5  Gluteus Medius: 2/5, 4/5    Knee ROM: (L/R)  Ext: +5 / +6  Flex: 115/115    SLR: Moderate quad lag observed during L SLR    DTR (L/R)  Patellar L4: " 2+/2+  Achilles S1: 2+/2+    Dermatomes intact BLE    Outcome Measures:   LEFS: 22  BBS: 20/56  5x STS: Unable to perform 1 rep w/o UE assist  5x STS: 13s w/ UE assist    Treatment:  Therapeutic Exercise: Handout provided: Completed in full  *Sit to stand w/ green TB  *Standing calf raises  *Supine isometric clamshell w/ green TB  *Supine SLR - strap assisted on L     Education: Daily activity/exercise, safety strategies to navigate home/environment, exercise frequency/intensity/duration.       Assessment:   Elliot presents to therapy demonstrating global L LE weakness - as well as limited R hip ROM and B knee ROM. Presentation appears to be as a result of multiple sustained injuries and past surgeries. Elliot is likely to benefit from skilled interventions to address their impairments, and treatment that includes: manual techniques to decrease pain and improve joint/soft-tissue mobility, as well as exercises to increase strength, endurance, and neuromotor control.    Standardized testing and measures administered today reveal that the patient has multiple impairments in body structures and functions, activity limitations, and participation restrictions.  The patient has 3 personal factors and comorbidities that may serve as barriers affecting the plan of care, including sedentary lifestyle, chronic history of current condition, limited support system. The patient's clinical presentation includes evolving & changing characteristics as noted during today's evaluation, & these findings indicate that this patient is of moderate complexity.  Skilled PT services are warranted in order to realize measurable change in the above outcome measures and achieve improvements in the patient's functional status and individual goals.    Plan:   Planned interventions include: dry needling, education/instruction, manual therapy, neuromuscular re-education, self care/home management, therapeutic activities and therapeutic exercises.    Potential to achieve rehab goals: fair  Goals:   Demonstrate independence with home exercise program  Tolerate increased exercise without adverse reaction  Demonstrate improved posture & proper body mechanics throughout session  Increase R hip ROM to pain free + WNL  Increase L strength to pain free + WNL  Increase score of LEFS by > 9 points to meet the MCID  Increase score of BBS by > 11.5 points to meet the MCID  Decrease time on 5x sit to stand test by > 2.3 sec to meet the MCID  Perform ADLs without an increase symptoms  Ascend / descend stairs without an increase in symptoms  Ambulate 500' without an increase in symptoms  Pt. will be able to sleep through the night without an increase in symptoms

## 2023-12-21 NOTE — LETTER
December 22, 2023    Nate Hirsch PT  [address] [facility]  [address]  [facility] [address]    Patient: Elliot Larson   YOB: 1950   Date of Visit: 12/21/2023       Dear Libra Corbett Md  [address]  [address]  [place] [address]    The attached plan of care is being sent to you because your patient’s medical reimbursement requires that you certify the plan of care. Your signature is required to allow uninterrupted insurance coverage.      You may indicate your approval by signing below and faxing this form back to us at Dept Fax: [phone].    Please call Dept: [phone] with any questions or concerns.    Thank you for this referral,        Nate Hirsch PT  [facility] [address] [facility]  [address] [facility] [address]    Payer: Payor: MEDICARE / Plan: MEDICARE PART A AND B / Product Type: *No Product type* /                                                                         Date:     Dear Nate Hirsch PT,     Re: Mr. Elliot Larson, MRN:[id number]    I certify that I have reviewed the attached plan of care and it is medically necessary for Mr. Elliot Larson (1950) who is under my care.          ______________________________________                    _________________  Provider name and credentials                                           Date and time                                                                                           Plan of Care 12/21/23   Effective from: 12/21/2023  Effective to: 12/23/2024    Plan ID: [id number]            Participants as of Finalize on 12/22/2023    Name Type Comments Contact Info    Libra Corbett MD PCP - General  [phone]    Nate Hirsch PT Physical Therapist  [phone]       Last Plan Note     Author: Nate Hirsch PT Status: Incomplete Last edited: 12/21/2023  5:00 PM       Patient Name: Elliot Larson  MRN: [id number]  Today's Date: 12/21/2023  Visit #1    Subjective:  Chief  "Complaint: Elliot is a 73 y.o. M who presents to therapy c/o global L LE weakness. He has a long history of injuries - including: L distal femoral fracture, L knee TKA (second on his L), and an intertrochanteric fracture of L femur. Elliot also notes a history of cervical and thoracic fractures. He notes that these series of injuries began after he retired in 2018. All incidents have been the result of a fall - with his most recent fall-related injury occurring on 6/24/2023. He received home therapy - which ended in September. Since that time - Elliot notes that he has been \"a couch potato.\" Adds that his main reason for coming to therapy is not pain - but decreased strength and functional mobility. He presents to therapy utilizing a Rollator.    Elliot lives alone in a condo. He has no stairs to navigate. He is able to take care of all day to day tasks - but notes that it is \"very slow.\"  Pain intensity at rest: 0/10  Pain intensity at worst: 2/10 \"occasionally achy'  Aggravating factors: Long periods of inactivity   PLOF: Prior to fall in 2018 - Elliot was able to complete all activities w/o any limitations  Occupation: Currently retired - but will be starting a new part time position in January.  Therapy Goals: \"Able to walk normally again\"    Objective:  Lumbar ROM: Unable to test d/t fear-avoidance    Hip ROM (L/R)  Flexion: 125°/105°  Abduction: 45°/35°  Extension: 10°/5°  External Rotation: 45°/35°  Internal rotation: 45°/25°    Specific Lower Extremity MMT (L/R)  Iliopsoas: 2+/5, 4/5  Gluteus Zheng (prone): 2+/5, 4/5  Hip External Rotation: 3-/5, 4/5  Gluteus Medius: 2/5, 4/5    Knee ROM: (L/R)  Ext: +5 / +6  Flex: 115/115    SLR: Moderate quad lag observed during L SLR    DTR (L/R)  Patellar L4: 2+/2+  Achilles S1: 2+/2+    Dermatomes intact BLE    Outcome Measures:   LEFS: 22  BBS: 20/56  5x STS: Unable to perform 1 rep w/o UE assist  5x STS: 13s w/ UE assist    Treatment:  Therapeutic Exercise: " Handout provided: Completed in full  *Sit to stand w/ green TB  *Standing calf raises  *Supine isometric clamshell w/ green TB  *Supine SLR - strap assisted on L     Education: Daily activity/exercise, safety strategies to navigate home/environment, exercise frequency/intensity/duration.       Assessment:   Elliot presents to therapy demonstrating global L LE weakness - as well as limited R hip ROM and B knee ROM. Presentation appears to be as a result of multiple sustained injuries and past surgeries. Elliot is likely to benefit from skilled interventions to address their impairments, and treatment that includes: manual techniques to decrease pain and improve joint/soft-tissue mobility, as well as exercises to increase strength, endurance, and neuromotor control.    Standardized testing and measures administered today reveal that the patient has multiple impairments in body structures and functions, activity limitations, and participation restrictions.  The patient has 3 personal factors and comorbidities that may serve as barriers affecting the plan of care, including sedentary lifestyle, chronic history of current condition, limited support system. The patient's clinical presentation includes evolving & changing characteristics as noted during today's evaluation, & these findings indicate that this patient is of moderate complexity.  Skilled PT services are warranted in order to realize measurable change in the above outcome measures and achieve improvements in the patient's functional status and individual goals.    Plan:   Planned interventions include: dry needling, education/instruction, manual therapy, neuromuscular re-education, self care/home management, therapeutic activities and therapeutic exercises.   Potential to achieve rehab goals: fair  Goals:   Demonstrate independence with home exercise program  Tolerate increased exercise without adverse reaction  Demonstrate improved posture & proper body  mechanics throughout session  Increase R hip ROM to pain free + WNL  Increase L strength to pain free + WNL  Increase score of LEFS by > 9 points to meet the MCID  Increase score of BBS by > 11.5 points to meet the MCID  Decrease time on 5x sit to stand test by > 2.3 sec to meet the MCID  Perform ADLs without an increase symptoms  Ascend / descend stairs without an increase in symptoms  Ambulate 500' without an increase in symptoms  Pt. will be able to sleep through the night without an increase in symptoms                 Current Participants as of 12/22/2023    Name Type Comments Contact Info    Libra Corbett MD PCP - General  [phone]    Signature pending    Nate Hirsch PT Physical Therapist  [phone]    Signature pending

## 2023-12-22 ASSESSMENT — ENCOUNTER SYMPTOMS
DEPRESSION: 1
OCCASIONAL FEELINGS OF UNSTEADINESS: 1
LOSS OF SENSATION IN FEET: 0

## 2023-12-22 ASSESSMENT — PATIENT HEALTH QUESTIONNAIRE - PHQ9
6. FEELING BAD ABOUT YOURSELF - OR THAT YOU ARE A FAILURE OR HAVE LET YOURSELF OR YOUR FAMILY DOWN: MORE THAN HALF THE DAYS
9. THOUGHTS THAT YOU WOULD BE BETTER OFF DEAD, OR OF HURTING YOURSELF: SEVERAL DAYS
1. LITTLE INTEREST OR PLEASURE IN DOING THINGS: MORE THAN HALF THE DAYS
SUM OF ALL RESPONSES TO PHQ QUESTIONS 1-9: 14
4. FEELING TIRED OR HAVING LITTLE ENERGY: MORE THAN HALF THE DAYS
2. FEELING DOWN, DEPRESSED OR HOPELESS: MORE THAN HALF THE DAYS
8. MOVING OR SPEAKING SO SLOWLY THAT OTHER PEOPLE COULD HAVE NOTICED. OR THE OPPOSITE, BEING SO FIGETY OR RESTLESS THAT YOU HAVE BEEN MOVING AROUND A LOT MORE THAN USUAL: SEVERAL DAYS
SUM OF ALL RESPONSES TO PHQ9 QUESTIONS 1 AND 2: 4
10. IF YOU CHECKED OFF ANY PROBLEMS, HOW DIFFICULT HAVE THESE PROBLEMS MADE IT FOR YOU TO DO YOUR WORK, TAKE CARE OF THINGS AT HOME, OR GET ALONG WITH OTHER PEOPLE: VERY DIFFICULT
3. TROUBLE FALLING OR STAYING ASLEEP OR SLEEPING TOO MUCH: MORE THAN HALF THE DAYS
7. TROUBLE CONCENTRATING ON THINGS, SUCH AS READING THE NEWSPAPER OR WATCHING TELEVISION: SEVERAL DAYS
5. POOR APPETITE OR OVEREATING: SEVERAL DAYS

## 2023-12-27 ENCOUNTER — LAB (OUTPATIENT)
Dept: LAB | Facility: LAB | Age: 73
End: 2023-12-27
Payer: MEDICARE

## 2023-12-27 DIAGNOSIS — D64.9 ANEMIA, UNSPECIFIED TYPE: ICD-10-CM

## 2023-12-27 DIAGNOSIS — K86.9 LESION OF PANCREAS (HHS-HCC): ICD-10-CM

## 2023-12-27 DIAGNOSIS — L29.9 GENERALIZED PRURITUS: ICD-10-CM

## 2023-12-27 DIAGNOSIS — R91.1 LUNG NODULE: ICD-10-CM

## 2023-12-27 LAB
ALBUMIN SERPL BCP-MCNC: 4.3 G/DL (ref 3.4–5)
ALP SERPL-CCNC: 84 U/L (ref 33–136)
ALT SERPL W P-5'-P-CCNC: 18 U/L (ref 10–52)
ANION GAP SERPL CALC-SCNC: 17 MMOL/L (ref 10–20)
AST SERPL W P-5'-P-CCNC: 15 U/L (ref 9–39)
BILIRUB SERPL-MCNC: 1.3 MG/DL (ref 0–1.2)
BUN SERPL-MCNC: 22 MG/DL (ref 6–23)
CALCIUM SERPL-MCNC: 9.4 MG/DL (ref 8.6–10.3)
CHLORIDE SERPL-SCNC: 108 MMOL/L (ref 98–107)
CO2 SERPL-SCNC: 23 MMOL/L (ref 21–32)
CREAT SERPL-MCNC: 1.26 MG/DL (ref 0.5–1.3)
ERYTHROCYTE [DISTWIDTH] IN BLOOD BY AUTOMATED COUNT: 12.7 % (ref 11.5–14.5)
GFR SERPL CREATININE-BSD FRML MDRD: 60 ML/MIN/1.73M*2
GLUCOSE SERPL-MCNC: 97 MG/DL (ref 74–99)
HCT VFR BLD AUTO: 41.6 % (ref 41–52)
HGB BLD-MCNC: 14.4 G/DL (ref 13.5–17.5)
IRON SATN MFR SERPL: 27 % (ref 25–45)
IRON SERPL-MCNC: 89 UG/DL (ref 35–150)
MCH RBC QN AUTO: 34.6 PG (ref 26–34)
MCHC RBC AUTO-ENTMCNC: 34.6 G/DL (ref 32–36)
MCV RBC AUTO: 100 FL (ref 80–100)
NRBC BLD-RTO: 0 /100 WBCS (ref 0–0)
PLATELET # BLD AUTO: 168 X10*3/UL (ref 150–450)
POTASSIUM SERPL-SCNC: 3.5 MMOL/L (ref 3.5–5.3)
PROT SERPL-MCNC: 7 G/DL (ref 6.4–8.2)
RBC # BLD AUTO: 4.16 X10*6/UL (ref 4.5–5.9)
SODIUM SERPL-SCNC: 144 MMOL/L (ref 136–145)
TIBC SERPL-MCNC: 328 UG/DL (ref 240–445)
UIBC SERPL-MCNC: 239 UG/DL (ref 110–370)
WBC # BLD AUTO: 5 X10*3/UL (ref 4.4–11.3)

## 2023-12-27 PROCEDURE — 83540 ASSAY OF IRON: CPT

## 2023-12-27 PROCEDURE — 80053 COMPREHEN METABOLIC PANEL: CPT

## 2023-12-27 PROCEDURE — 82728 ASSAY OF FERRITIN: CPT

## 2023-12-27 PROCEDURE — 83550 IRON BINDING TEST: CPT

## 2023-12-27 PROCEDURE — 36415 COLL VENOUS BLD VENIPUNCTURE: CPT

## 2023-12-27 PROCEDURE — 85027 COMPLETE CBC AUTOMATED: CPT

## 2023-12-28 LAB — FERRITIN SERPL-MCNC: 68 NG/ML (ref 20–300)

## 2023-12-29 ENCOUNTER — APPOINTMENT (OUTPATIENT)
Dept: RADIOLOGY | Facility: CLINIC | Age: 73
End: 2023-12-29
Payer: MEDICARE

## 2023-12-29 ENCOUNTER — ANCILLARY PROCEDURE (OUTPATIENT)
Dept: RADIOLOGY | Facility: CLINIC | Age: 73
End: 2023-12-29
Payer: MEDICARE

## 2023-12-29 DIAGNOSIS — R93.1 ABNORMAL FINDINGS ON DIAGNOSTIC IMAGING OF HEART AND CORONARY CIRCULATION: ICD-10-CM

## 2023-12-29 PROCEDURE — 71250 CT THORAX DX C-: CPT

## 2023-12-29 PROCEDURE — 71250 CT THORAX DX C-: CPT | Performed by: RADIOLOGY

## 2024-01-02 ENCOUNTER — TELEPHONE (OUTPATIENT)
Dept: PRIMARY CARE | Facility: CLINIC | Age: 74
End: 2024-01-02
Payer: MEDICARE

## 2024-01-02 DIAGNOSIS — I25.10 CORONARY ARTERY CALCIFICATION: Primary | ICD-10-CM

## 2024-01-02 DIAGNOSIS — E11.9 DIABETES MELLITUS WITHOUT OPHTHALMIC MANIFESTATIONS (MULTI): Primary | ICD-10-CM

## 2024-01-02 DIAGNOSIS — F32.A DEPRESSION, UNSPECIFIED DEPRESSION TYPE: ICD-10-CM

## 2024-01-02 DIAGNOSIS — I25.84 CORONARY ARTERY CALCIFICATION: Primary | ICD-10-CM

## 2024-01-02 RX ORDER — TIRZEPATIDE 7.5 MG/.5ML
7.5 INJECTION, SOLUTION SUBCUTANEOUS
Qty: 2 ML | Refills: 0 | Status: SHIPPED | OUTPATIENT
Start: 2024-01-02 | End: 2024-01-18

## 2024-01-03 ENCOUNTER — TELEPHONE (OUTPATIENT)
Dept: GASTROENTEROLOGY | Facility: HOSPITAL | Age: 74
End: 2024-01-03
Payer: MEDICARE

## 2024-01-03 RX ORDER — DULOXETIN HYDROCHLORIDE 60 MG/1
60 CAPSULE, DELAYED RELEASE ORAL DAILY
Qty: 90 CAPSULE | Refills: 1 | Status: SHIPPED | OUTPATIENT
Start: 2024-01-03

## 2024-01-03 NOTE — TELEPHONE ENCOUNTER
Spoke with the patient via phone regarding CT results from 12/29/2023. Patient is aware that Dr. Arevalo is recommending patient to follow up with his PCP regarding his CT results. Patient is aware and verbalized understanding.

## 2024-01-04 NOTE — TELEPHONE ENCOUNTER
Unable to reach patient by phone left voice mail message for Patient to call [phone]  opt #1 and opt#1 again to schedule an office appointment with  Dr. Corbett .

## 2024-01-04 NOTE — TELEPHONE ENCOUNTER
----- Message from Libra oCrbett MD sent at 12/29/2023  5:50 PM EST -----  Labs good but he is overdue to make appointment; please let him know; I could not reach him.

## 2024-01-08 ENCOUNTER — APPOINTMENT (OUTPATIENT)
Dept: PHYSICAL THERAPY | Facility: CLINIC | Age: 74
End: 2024-01-08
Payer: MEDICARE

## 2024-01-11 ENCOUNTER — TELEPHONE (OUTPATIENT)
Dept: PRIMARY CARE | Facility: CLINIC | Age: 74
End: 2024-01-11
Payer: MEDICARE

## 2024-01-11 DIAGNOSIS — E11.9 DIABETES MELLITUS WITHOUT OPHTHALMIC MANIFESTATIONS (MULTI): ICD-10-CM

## 2024-01-11 NOTE — TELEPHONE ENCOUNTER
----- Message from Libra Corbett MD sent at 12/29/2023  5:50 PM EST -----  Labs good but he is overdue to make appointment; please let him know; I could not reach him.

## 2024-01-11 NOTE — TELEPHONE ENCOUNTER
I notified Pt.  Elliot Larson of Dr. Corbett message. Pt. expressed understanding of the message given. Dr. Chelle Zarate also said that he have a mass on his lung and hope that your aware of it and following him on it.Elliot has a scheduled office appointment with you for January 30, 2024,  If with questions call Elliot back at [phone].

## 2024-01-12 RX ORDER — TIRZEPATIDE 7.5 MG/.5ML
7.5 INJECTION, SOLUTION SUBCUTANEOUS
Qty: 2 ML | Refills: 2 | OUTPATIENT
Start: 2024-01-12

## 2024-01-15 ENCOUNTER — APPOINTMENT (OUTPATIENT)
Dept: RADIOLOGY | Facility: CLINIC | Age: 74
End: 2024-01-15
Payer: MEDICARE

## 2024-01-17 DIAGNOSIS — E11.9 DIABETES MELLITUS WITHOUT OPHTHALMIC MANIFESTATIONS (MULTI): ICD-10-CM

## 2024-01-18 ENCOUNTER — APPOINTMENT (OUTPATIENT)
Dept: PHYSICAL THERAPY | Facility: CLINIC | Age: 74
End: 2024-01-18
Payer: MEDICARE

## 2024-01-18 DIAGNOSIS — R91.1 INCIDENTAL LUNG NODULE: Primary | ICD-10-CM

## 2024-01-18 RX ORDER — TIRZEPATIDE 7.5 MG/.5ML
INJECTION, SOLUTION SUBCUTANEOUS
Refills: 0 | OUTPATIENT
Start: 2024-01-18

## 2024-01-18 RX ORDER — TIRZEPATIDE 7.5 MG/.5ML
7.5 INJECTION, SOLUTION SUBCUTANEOUS
Qty: 2 ML | Refills: 3 | Status: SHIPPED | OUTPATIENT
Start: 2024-01-18 | End: 2024-04-17 | Stop reason: SDUPTHER

## 2024-01-19 ENCOUNTER — APPOINTMENT (OUTPATIENT)
Dept: OPHTHALMOLOGY | Facility: CLINIC | Age: 74
End: 2024-01-19
Payer: MEDICARE

## 2024-01-20 PROBLEM — R91.1 INCIDENTAL LUNG NODULE: Status: ACTIVE | Noted: 2024-01-20

## 2024-01-26 ENCOUNTER — HOSPITAL ENCOUNTER (OUTPATIENT)
Dept: RADIOLOGY | Facility: CLINIC | Age: 74
Discharge: HOME | End: 2024-01-26
Payer: MEDICARE

## 2024-01-26 DIAGNOSIS — I25.10 CORONARY ARTERY CALCIFICATION: ICD-10-CM

## 2024-01-26 DIAGNOSIS — I25.84 CORONARY ARTERY CALCIFICATION: ICD-10-CM

## 2024-01-26 PROCEDURE — 75571 CT HRT W/O DYE W/CA TEST: CPT

## 2024-01-29 DIAGNOSIS — E78.5 DYSLIPIDEMIA: Primary | ICD-10-CM

## 2024-01-29 PROBLEM — R93.1 ELEVATED CORONARY ARTERY CALCIUM SCORE: Status: ACTIVE | Noted: 2024-01-02

## 2024-01-29 RX ORDER — ROSUVASTATIN CALCIUM 10 MG/1
10 TABLET, COATED ORAL DAILY
Qty: 90 TABLET | Refills: 0 | Status: SHIPPED | OUTPATIENT
Start: 2024-01-29 | End: 2024-04-17

## 2024-01-30 ENCOUNTER — APPOINTMENT (OUTPATIENT)
Dept: PRIMARY CARE | Facility: CLINIC | Age: 74
End: 2024-01-30
Payer: MEDICARE

## 2024-01-31 NOTE — TELEPHONE ENCOUNTER
Dr. Corbett, Pt. left a voicemail message saying he would like for you to mail a Rx for a handicap placard to his home address as soon as possible, because the other one .

## 2024-02-12 DIAGNOSIS — D64.9 ANEMIA, UNSPECIFIED TYPE: Primary | ICD-10-CM

## 2024-02-13 ENCOUNTER — APPOINTMENT (OUTPATIENT)
Dept: HEMATOLOGY/ONCOLOGY | Facility: CLINIC | Age: 74
End: 2024-02-13
Payer: MEDICARE

## 2024-02-14 ENCOUNTER — OFFICE VISIT (OUTPATIENT)
Dept: PRIMARY CARE | Facility: CLINIC | Age: 74
End: 2024-02-14
Payer: MEDICARE

## 2024-02-14 VITALS
SYSTOLIC BLOOD PRESSURE: 125 MMHG | BODY MASS INDEX: 31.32 KG/M2 | WEIGHT: 206 LBS | DIASTOLIC BLOOD PRESSURE: 73 MMHG | HEART RATE: 61 BPM

## 2024-02-14 DIAGNOSIS — Z87.81 S/P RIGHT HIP FRACTURE: ICD-10-CM

## 2024-02-14 DIAGNOSIS — E11.9 TYPE 2 DIABETES MELLITUS WITHOUT COMPLICATION, WITHOUT LONG-TERM CURRENT USE OF INSULIN (MULTI): ICD-10-CM

## 2024-02-14 DIAGNOSIS — F32.89 OTHER DEPRESSION: ICD-10-CM

## 2024-02-14 DIAGNOSIS — Z09 ENCOUNTER FOR FOLLOW-UP EXAMINATION AFTER COMPLETED TREATMENT FOR CONDITIONS OTHER THAN MALIGNANT NEOPLASM: ICD-10-CM

## 2024-02-14 DIAGNOSIS — E78.5 DYSLIPIDEMIA: ICD-10-CM

## 2024-02-14 DIAGNOSIS — I10 HYPERTENSION, UNSPECIFIED TYPE: ICD-10-CM

## 2024-02-14 DIAGNOSIS — N40.1 BENIGN PROSTATIC HYPERPLASIA WITH LOWER URINARY TRACT SYMPTOMS, SYMPTOM DETAILS UNSPECIFIED: ICD-10-CM

## 2024-02-14 DIAGNOSIS — R26.9 GAIT DISORDER: ICD-10-CM

## 2024-02-14 DIAGNOSIS — S72.009A: ICD-10-CM

## 2024-02-14 DIAGNOSIS — M77.11 LATERAL EPICONDYLITIS OF RIGHT ELBOW: ICD-10-CM

## 2024-02-14 DIAGNOSIS — S72.92XA CLOSED FRACTURE OF LEFT FEMUR, UNSPECIFIED FRACTURE MORPHOLOGY, UNSPECIFIED PORTION OF FEMUR, INITIAL ENCOUNTER (MULTI): ICD-10-CM

## 2024-02-14 DIAGNOSIS — R60.9 DEPENDENT EDEMA: ICD-10-CM

## 2024-02-14 DIAGNOSIS — Z00.00 MEDICARE ANNUAL WELLNESS VISIT, SUBSEQUENT: Primary | ICD-10-CM

## 2024-02-14 DIAGNOSIS — I77.819 AORTIC DILATATION (CMS-HCC): ICD-10-CM

## 2024-02-14 DIAGNOSIS — L29.9 GENERALIZED PRURITUS: ICD-10-CM

## 2024-02-14 DIAGNOSIS — R91.1 INCIDENTAL LUNG NODULE: ICD-10-CM

## 2024-02-14 DIAGNOSIS — I25.10 CORONARY ARTERY DISEASE INVOLVING NATIVE CORONARY ARTERY OF NATIVE HEART, UNSPECIFIED WHETHER ANGINA PRESENT: ICD-10-CM

## 2024-02-14 DIAGNOSIS — J45.909 ASTHMA, UNSPECIFIED ASTHMA SEVERITY, UNSPECIFIED WHETHER COMPLICATED, UNSPECIFIED WHETHER PERSISTENT (HHS-HCC): ICD-10-CM

## 2024-02-14 PROBLEM — M47.816 LUMBAR SPONDYLOSIS: Status: RESOLVED | Noted: 2023-03-11 | Resolved: 2024-02-14

## 2024-02-14 PROBLEM — M54.2 NECK PAIN: Status: RESOLVED | Noted: 2023-03-11 | Resolved: 2024-02-14

## 2024-02-14 PROBLEM — N39.0 CATHETER-ASSOCIATED URINARY TRACT INFECTION (CMS-HCC): Status: RESOLVED | Noted: 2023-08-30 | Resolved: 2024-02-14

## 2024-02-14 PROBLEM — J95.2 ACUTE POSTOPERATIVE PULMONARY INSUFFICIENCY (CMS-HCC): Status: RESOLVED | Noted: 2022-02-01 | Resolved: 2024-02-14

## 2024-02-14 PROBLEM — D64.9 ANEMIA: Status: RESOLVED | Noted: 2023-03-11 | Resolved: 2024-02-14

## 2024-02-14 PROBLEM — Z96.653 PRESENCE OF ARTIFICIAL KNEE JOINT, BILATERAL: Status: ACTIVE | Noted: 2022-02-06

## 2024-02-14 PROBLEM — M79.646 THUMB PAIN: Status: RESOLVED | Noted: 2023-03-11 | Resolved: 2024-02-14

## 2024-02-14 PROBLEM — N39.0 RECURRENT URINARY TRACT INFECTION: Status: RESOLVED | Noted: 2023-08-30 | Resolved: 2024-02-14

## 2024-02-14 PROBLEM — D62 ACUTE BLOOD LOSS ANEMIA: Status: RESOLVED | Noted: 2022-02-01 | Resolved: 2024-02-14

## 2024-02-14 PROBLEM — M79.609 LIMB PAIN: Status: RESOLVED | Noted: 2023-03-11 | Resolved: 2024-02-14

## 2024-02-14 PROBLEM — G89.18 ACUTE POSTOPERATIVE PAIN: Status: RESOLVED | Noted: 2022-02-01 | Resolved: 2024-02-14

## 2024-02-14 PROBLEM — T83.511A CATHETER-ASSOCIATED URINARY TRACT INFECTION (CMS-HCC): Status: RESOLVED | Noted: 2023-08-30 | Resolved: 2024-02-14

## 2024-02-14 PROBLEM — R97.20 PSA ELEVATION: Status: RESOLVED | Noted: 2023-03-11 | Resolved: 2024-02-14

## 2024-02-14 PROBLEM — E87.6 HYPOKALEMIA: Status: RESOLVED | Noted: 2023-03-11 | Resolved: 2024-02-14

## 2024-02-14 PROBLEM — G44.86 CERVICOGENIC HEADACHE: Status: RESOLVED | Noted: 2023-03-11 | Resolved: 2024-02-14

## 2024-02-14 PROBLEM — N40.0 ENLARGED PROSTATE: Status: RESOLVED | Noted: 2023-03-11 | Resolved: 2024-02-14

## 2024-02-14 PROBLEM — M72.0 DUPUYTREN'S FIBROMATOSIS: Status: RESOLVED | Noted: 2023-08-30 | Resolved: 2024-02-14

## 2024-02-14 PROBLEM — R51.9 ACUTE HEADACHE: Status: RESOLVED | Noted: 2023-08-30 | Resolved: 2024-02-14

## 2024-02-14 PROBLEM — G89.29 CHRONIC HEADACHES: Status: RESOLVED | Noted: 2023-03-11 | Resolved: 2024-02-14

## 2024-02-14 PROBLEM — M54.14 THORACIC RADICULOPATHY: Status: RESOLVED | Noted: 2023-03-11 | Resolved: 2024-02-14

## 2024-02-14 PROBLEM — M79.7 FIBROMYALGIA: Status: RESOLVED | Noted: 2023-03-11 | Resolved: 2024-02-14

## 2024-02-14 PROBLEM — S16.1XXA NECK STRAIN: Status: RESOLVED | Noted: 2023-03-11 | Resolved: 2024-02-14

## 2024-02-14 PROBLEM — S72.009D CLOSED HIP FRACTURE REQUIRING OPERATIVE REPAIR WITH ROUTINE HEALING: Status: ACTIVE | Noted: 2023-07-19

## 2024-02-14 PROBLEM — N39.0 RECURRENT UTI: Status: RESOLVED | Noted: 2023-08-30 | Resolved: 2024-02-14

## 2024-02-14 PROBLEM — N52.8 OTHER MALE ERECTILE DYSFUNCTION: Status: RESOLVED | Noted: 2023-03-11 | Resolved: 2024-02-14

## 2024-02-14 PROBLEM — M79.10 MYALGIA: Status: RESOLVED | Noted: 2023-03-11 | Resolved: 2024-02-14

## 2024-02-14 PROBLEM — M47.892 OTHER SPONDYLOSIS, CERVICAL REGION: Status: RESOLVED | Noted: 2023-03-11 | Resolved: 2024-02-14

## 2024-02-14 PROBLEM — R51.9 CHRONIC HEADACHES: Status: RESOLVED | Noted: 2023-03-11 | Resolved: 2024-02-14

## 2024-02-14 PROBLEM — S72.142A INTERTROCHANTERIC FRACTURE OF LEFT FEMUR, CLOSED, INITIAL ENCOUNTER (MULTI): Status: ACTIVE | Noted: 2023-06-24

## 2024-02-14 PROBLEM — M67.972 DISORDER OF LEFT ACHILLES TENDON: Status: RESOLVED | Noted: 2018-04-24 | Resolved: 2024-02-14

## 2024-02-14 PROBLEM — Z98.890 HISTORY OF VITRECTOMY: Status: ACTIVE | Noted: 2024-02-14

## 2024-02-14 PROBLEM — M25.562 LEFT KNEE PAIN: Status: RESOLVED | Noted: 2023-03-11 | Resolved: 2024-02-14

## 2024-02-14 PROBLEM — R26.2 DIFFICULTY WALKING: Status: RESOLVED | Noted: 2018-04-24 | Resolved: 2024-02-14

## 2024-02-14 PROBLEM — D53.9 MACROCYTIC ANEMIA: Status: RESOLVED | Noted: 2023-03-11 | Resolved: 2024-02-14

## 2024-02-14 PROBLEM — M25.521 RIGHT ELBOW PAIN: Status: RESOLVED | Noted: 2023-03-11 | Resolved: 2024-02-14

## 2024-02-14 PROBLEM — M25.50 ARTHRALGIA OF MULTIPLE SITES: Status: RESOLVED | Noted: 2023-03-11 | Resolved: 2024-02-14

## 2024-02-14 PROBLEM — M79.645 PAIN OF FINGER OF LEFT HAND: Status: RESOLVED | Noted: 2023-03-11 | Resolved: 2024-02-14

## 2024-02-14 PROBLEM — M54.50 LOW BACK PAIN: Status: RESOLVED | Noted: 2023-03-11 | Resolved: 2024-02-14

## 2024-02-14 PROBLEM — N32.81 OVERACTIVE BLADDER: Status: ACTIVE | Noted: 2024-02-14

## 2024-02-14 PROBLEM — H34.8192 HISTORY OF CENTRAL RETINAL VEIN OCCLUSION (CMS-HCC): Status: ACTIVE | Noted: 2024-02-14

## 2024-02-14 PROBLEM — Z96.642 PRESENCE OF LEFT ARTIFICIAL HIP JOINT: Status: ACTIVE | Noted: 2022-02-06

## 2024-02-14 PROBLEM — R35.0 URINARY FREQUENCY: Status: RESOLVED | Noted: 2023-03-11 | Resolved: 2024-02-14

## 2024-02-14 PROBLEM — S46.811A STRAIN OF TRAPEZIUS MUSCLE, RIGHT, INITIAL ENCOUNTER: Status: RESOLVED | Noted: 2023-03-11 | Resolved: 2024-02-14

## 2024-02-14 PROCEDURE — 3008F BODY MASS INDEX DOCD: CPT | Performed by: PEDIATRICS

## 2024-02-14 PROCEDURE — 3078F DIAST BP <80 MM HG: CPT | Performed by: PEDIATRICS

## 2024-02-14 PROCEDURE — 1125F AMNT PAIN NOTED PAIN PRSNT: CPT | Performed by: PEDIATRICS

## 2024-02-14 PROCEDURE — 83036 HEMOGLOBIN GLYCOSYLATED A1C: CPT | Performed by: PEDIATRICS

## 2024-02-14 PROCEDURE — 1160F RVW MEDS BY RX/DR IN RCRD: CPT | Performed by: PEDIATRICS

## 2024-02-14 PROCEDURE — 1159F MED LIST DOCD IN RCRD: CPT | Performed by: PEDIATRICS

## 2024-02-14 PROCEDURE — 3074F SYST BP LT 130 MM HG: CPT | Performed by: PEDIATRICS

## 2024-02-14 PROCEDURE — 99213 OFFICE O/P EST LOW 20 MIN: CPT | Performed by: PEDIATRICS

## 2024-02-14 PROCEDURE — 1170F FXNL STATUS ASSESSED: CPT | Performed by: PEDIATRICS

## 2024-02-14 PROCEDURE — 4010F ACE/ARB THERAPY RXD/TAKEN: CPT | Performed by: PEDIATRICS

## 2024-02-14 PROCEDURE — G0439 PPPS, SUBSEQ VISIT: HCPCS | Performed by: PEDIATRICS

## 2024-02-14 PROCEDURE — 1036F TOBACCO NON-USER: CPT | Performed by: PEDIATRICS

## 2024-02-14 RX ORDER — ALBUTEROL SULFATE 0.63 MG/3ML
0.63 SOLUTION RESPIRATORY (INHALATION) EVERY 6 HOURS PRN
Qty: 25 ML | Refills: 1 | Status: SHIPPED | OUTPATIENT
Start: 2024-02-14 | End: 2025-02-13

## 2024-02-14 RX ORDER — METFORMIN HYDROCHLORIDE 500 MG/1
2000 TABLET, EXTENDED RELEASE ORAL
Qty: 360 TABLET | Refills: 0 | OUTPATIENT
Start: 2024-02-14

## 2024-02-14 RX ORDER — HYDROXYZINE PAMOATE 25 MG/1
25 CAPSULE ORAL 3 TIMES DAILY PRN
Qty: 30 CAPSULE | Refills: 0 | Status: SHIPPED | OUTPATIENT
Start: 2024-02-14 | End: 2024-02-15 | Stop reason: ENTERED-IN-ERROR

## 2024-02-14 ASSESSMENT — ACTIVITIES OF DAILY LIVING (ADL)
GROCERY_SHOPPING: INDEPENDENT
DRESSING: INDEPENDENT
BATHING: INDEPENDENT
TAKING_MEDICATION: INDEPENDENT
DOING_HOUSEWORK: NEEDS ASSISTANCE
MANAGING_FINANCES: INDEPENDENT

## 2024-02-14 ASSESSMENT — PATIENT HEALTH QUESTIONNAIRE - PHQ9
2. FEELING DOWN, DEPRESSED OR HOPELESS: NEARLY EVERY DAY
SUM OF ALL RESPONSES TO PHQ9 QUESTIONS 1 AND 2: 3
1. LITTLE INTEREST OR PLEASURE IN DOING THINGS: NOT AT ALL

## 2024-02-14 NOTE — PROGRESS NOTES
Subjective   Patient ID: Elliot Larson is a 73 y.o. male who presents for Medicare Wellness    HPI   Patient vapes sometimes; advised patient to stop.  Is pleased with new job he got;    Review of Systems  Right upper quadrant pain that radiates down; not daily; not brought on by movement or eating.     Objective   /73   Pulse 61   Wt 93.4 kg (206 lb)   BMI 31.32 kg/m²     Physical Exam  Unable to feel right pedal  Lymphedema  Lungs clear  Heart RRR  Abd soft NT    Assessment/Plan   Problem List Items Addressed This Visit             ICD-10-CM    RESOLVED: Aortic dilatation (CMS/[facility]) I77.819    Asthma J45.909     Montelukast has helped         Relevant Medications    albuterol 0.63 mg/3 mL nebulizer solution    Dependent edema R60.9    Depression F32.A     Duloxetine 90 helps; mourns the theft of his dog         Dyslipidemia E78.5    Enlarged prostate with lower urinary tract symptoms (LUTS) N40.1     Has a urologist; Oxybutinin is helping         Hypertension I10    RESOLVED: Lateral epicondylitis of right elbow M77.11    Gait disorder R26.9     Cannot walk without walker since fracture of left hip in June 2023         Diabetes mellitus (CMS/[facility]) E11.9     Sugar was running under 150; felt he was going low at times (shaky); will stop glipizide         Relevant Medications    metFORMIN  mg 24 hr tablet    Other Relevant Orders    Hemoglobin A1C    Generalized pruritus L29.9    Relevant Medications    hydrOXYzine pamoate (VistariL) 25 mg capsule    Incidental lung nodule R91.1     Repeat CT due next month         Recent fracture of hip (CMS/[facility]) S72.009A    Femur fracture, left (CMS/[facility]) S72.92XA    Relevant Orders    XR DEXA bone density     Other Visit Diagnoses         Codes    Medicare annual wellness visit, subsequent    -  Primary Z00.00    Coronary artery disease involving native coronary artery of native heart, unspecified whether angina present     I25.10    Relevant Orders     Echocardiogram Stress Test    S/P right hip fracture     Z87.81    Relevant Orders    Disability Placard    XR DEXA bone density    Encounter for follow-up examination after completed treatment for conditions other than malignant neoplasm     Z09    Relevant Orders    XR DEXA bone density

## 2024-02-15 DIAGNOSIS — L29.9 GENERALIZED PRURITUS: ICD-10-CM

## 2024-02-15 PROBLEM — D69.6 THROMBOCYTOPENIA (CMS-HCC): Status: RESOLVED | Noted: 2023-03-11 | Resolved: 2024-02-15

## 2024-02-15 RX ORDER — HYDROXYZINE HYDROCHLORIDE 25 MG/1
25 TABLET, FILM COATED ORAL 3 TIMES DAILY
Qty: 90 TABLET | Refills: 2 | Status: SHIPPED | OUTPATIENT
Start: 2024-02-15 | End: 2024-05-15

## 2024-02-26 ENCOUNTER — APPOINTMENT (OUTPATIENT)
Dept: HEMATOLOGY/ONCOLOGY | Facility: CLINIC | Age: 74
End: 2024-02-26
Payer: MEDICARE

## 2024-03-05 ENCOUNTER — APPOINTMENT (OUTPATIENT)
Dept: CARDIOLOGY | Facility: CLINIC | Age: 74
End: 2024-03-05
Payer: MEDICARE

## 2024-03-06 DIAGNOSIS — I10 HYPERTENSION, UNSPECIFIED TYPE: ICD-10-CM

## 2024-03-06 DIAGNOSIS — N32.81 OVERACTIVE BLADDER: ICD-10-CM

## 2024-03-07 RX ORDER — OXYBUTYNIN CHLORIDE 10 MG/1
TABLET, EXTENDED RELEASE ORAL
Qty: 90 TABLET | Refills: 1 | Status: SHIPPED | OUTPATIENT
Start: 2024-03-07

## 2024-03-07 RX ORDER — CLONIDINE HYDROCHLORIDE 0.3 MG/1
0.3 TABLET ORAL 2 TIMES DAILY
Qty: 180 TABLET | Refills: 1 | Status: SHIPPED | OUTPATIENT
Start: 2024-03-07

## 2024-03-11 DIAGNOSIS — H40.052 OCULAR HYPERTENSION OF LEFT EYE: Primary | ICD-10-CM

## 2024-03-11 RX ORDER — LATANOPROST 50 UG/ML
1 SOLUTION/ DROPS OPHTHALMIC NIGHTLY
Qty: 7.5 ML | Refills: 3 | Status: SHIPPED | OUTPATIENT
Start: 2024-03-11 | End: 2025-03-11

## 2024-03-13 ENCOUNTER — APPOINTMENT (OUTPATIENT)
Dept: RADIOLOGY | Facility: CLINIC | Age: 74
End: 2024-03-13
Payer: MEDICARE

## 2024-03-13 ENCOUNTER — APPOINTMENT (OUTPATIENT)
Dept: CARDIOLOGY | Facility: CLINIC | Age: 74
End: 2024-03-13
Payer: MEDICARE

## 2024-03-18 ENCOUNTER — APPOINTMENT (OUTPATIENT)
Dept: CARDIOLOGY | Facility: CLINIC | Age: 74
End: 2024-03-18
Payer: MEDICARE

## 2024-03-20 ENCOUNTER — APPOINTMENT (OUTPATIENT)
Dept: RADIOLOGY | Facility: CLINIC | Age: 74
End: 2024-03-20
Payer: MEDICARE

## 2024-03-25 ENCOUNTER — APPOINTMENT (OUTPATIENT)
Dept: RADIOLOGY | Facility: CLINIC | Age: 74
End: 2024-03-25
Payer: MEDICARE

## 2024-03-26 ENCOUNTER — APPOINTMENT (OUTPATIENT)
Dept: CARDIOLOGY | Facility: CLINIC | Age: 74
End: 2024-03-26
Payer: MEDICARE

## 2024-04-16 DIAGNOSIS — E78.5 DYSLIPIDEMIA: ICD-10-CM

## 2024-04-17 ENCOUNTER — HOSPITAL ENCOUNTER (OUTPATIENT)
Dept: RADIOLOGY | Facility: CLINIC | Age: 74
Discharge: HOME | End: 2024-04-17
Payer: MEDICARE

## 2024-04-17 DIAGNOSIS — Z09 ENCOUNTER FOR FOLLOW-UP EXAMINATION AFTER COMPLETED TREATMENT FOR CONDITIONS OTHER THAN MALIGNANT NEOPLASM: ICD-10-CM

## 2024-04-17 DIAGNOSIS — S72.92XA CLOSED FRACTURE OF LEFT FEMUR, UNSPECIFIED FRACTURE MORPHOLOGY, UNSPECIFIED PORTION OF FEMUR, INITIAL ENCOUNTER (MULTI): ICD-10-CM

## 2024-04-17 DIAGNOSIS — R91.1 INCIDENTAL LUNG NODULE: ICD-10-CM

## 2024-04-17 DIAGNOSIS — Z87.81 S/P RIGHT HIP FRACTURE: ICD-10-CM

## 2024-04-17 DIAGNOSIS — E11.9 DIABETES MELLITUS WITHOUT OPHTHALMIC MANIFESTATIONS (MULTI): ICD-10-CM

## 2024-04-17 PROCEDURE — 71250 CT THORAX DX C-: CPT

## 2024-04-17 PROCEDURE — 77080 DXA BONE DENSITY AXIAL: CPT

## 2024-04-17 PROCEDURE — 71250 CT THORAX DX C-: CPT | Performed by: RADIOLOGY

## 2024-04-17 RX ORDER — ROSUVASTATIN CALCIUM 10 MG/1
10 TABLET, COATED ORAL DAILY
Qty: 90 TABLET | Refills: 1 | Status: SHIPPED | OUTPATIENT
Start: 2024-04-17

## 2024-04-18 RX ORDER — TIRZEPATIDE 7.5 MG/.5ML
7.5 INJECTION, SOLUTION SUBCUTANEOUS
Qty: 2 ML | Refills: 3 | Status: SHIPPED | OUTPATIENT
Start: 2024-04-21

## 2024-04-19 ENCOUNTER — TELEPHONE (OUTPATIENT)
Dept: PRIMARY CARE | Facility: CLINIC | Age: 74
End: 2024-04-19

## 2024-04-19 ENCOUNTER — APPOINTMENT (OUTPATIENT)
Dept: OPHTHALMOLOGY | Facility: CLINIC | Age: 74
End: 2024-04-19
Payer: MEDICARE

## 2024-04-19 NOTE — TELEPHONE ENCOUNTER
Pt called and would like a call back in regards to bone density and chest CT scan results.    He also request a hard copy of the results.

## 2024-04-27 DIAGNOSIS — J45.909 ASTHMA, UNSPECIFIED ASTHMA SEVERITY, UNSPECIFIED WHETHER COMPLICATED, UNSPECIFIED WHETHER PERSISTENT (HHS-HCC): ICD-10-CM

## 2024-04-27 DIAGNOSIS — Z00.00 HEALTHCARE MAINTENANCE: ICD-10-CM

## 2024-04-27 DIAGNOSIS — M51.36 DISC DEGENERATION, LUMBAR: ICD-10-CM

## 2024-04-27 DIAGNOSIS — E11.9 DIABETES MELLITUS WITHOUT OPHTHALMIC MANIFESTATIONS (MULTI): ICD-10-CM

## 2024-04-30 ENCOUNTER — TELEPHONE (OUTPATIENT)
Dept: PRIMARY CARE | Facility: CLINIC | Age: 74
End: 2024-04-30
Payer: MEDICARE

## 2024-04-30 ENCOUNTER — LAB (OUTPATIENT)
Dept: LAB | Facility: CLINIC | Age: 74
End: 2024-04-30
Payer: MEDICARE

## 2024-04-30 DIAGNOSIS — D64.9 ANEMIA, UNSPECIFIED TYPE: ICD-10-CM

## 2024-04-30 DIAGNOSIS — D69.6 THROMBOCYTOPENIA (CMS-HCC): ICD-10-CM

## 2024-04-30 DIAGNOSIS — D53.9 MACROCYTIC ANEMIA: ICD-10-CM

## 2024-04-30 LAB
ALBUMIN SERPL BCP-MCNC: 4.4 G/DL (ref 3.4–5)
ALP SERPL-CCNC: 89 U/L (ref 33–136)
ALT SERPL W P-5'-P-CCNC: 16 U/L (ref 10–52)
ANION GAP SERPL CALC-SCNC: 10 MMOL/L (ref 10–20)
AST SERPL W P-5'-P-CCNC: 17 U/L (ref 9–39)
BASOPHILS # BLD AUTO: 0.02 X10*3/UL (ref 0–0.1)
BASOPHILS NFR BLD AUTO: 0.4 %
BILIRUB SERPL-MCNC: 1.2 MG/DL (ref 0–1.2)
BUN SERPL-MCNC: 18 MG/DL (ref 6–23)
CALCIUM SERPL-MCNC: 9.8 MG/DL (ref 8.6–10.3)
CHLORIDE SERPL-SCNC: 109 MMOL/L (ref 98–107)
CO2 SERPL-SCNC: 26 MMOL/L (ref 21–32)
CREAT SERPL-MCNC: 0.95 MG/DL (ref 0.5–1.3)
EGFRCR SERPLBLD CKD-EPI 2021: 84 ML/MIN/1.73M*2
EOSINOPHIL # BLD AUTO: 0.23 X10*3/UL (ref 0–0.4)
EOSINOPHIL NFR BLD AUTO: 4.6 %
ERYTHROCYTE [DISTWIDTH] IN BLOOD BY AUTOMATED COUNT: 12.5 % (ref 11.5–14.5)
FERRITIN SERPL-MCNC: 78 NG/ML (ref 20–300)
GLUCOSE SERPL-MCNC: 142 MG/DL (ref 74–99)
HCT VFR BLD AUTO: 41.9 % (ref 41–52)
HGB BLD-MCNC: 14.4 G/DL (ref 13.5–17.5)
IMM GRANULOCYTES # BLD AUTO: 0.01 X10*3/UL (ref 0–0.5)
IMM GRANULOCYTES NFR BLD AUTO: 0.2 % (ref 0–0.9)
IRON SATN MFR SERPL: 41 % (ref 25–45)
IRON SERPL-MCNC: 141 UG/DL (ref 35–150)
LYMPHOCYTES # BLD AUTO: 1.5 X10*3/UL (ref 0.8–3)
LYMPHOCYTES NFR BLD AUTO: 29.8 %
MCH RBC QN AUTO: 34.4 PG (ref 26–34)
MCHC RBC AUTO-ENTMCNC: 34.4 G/DL (ref 32–36)
MCV RBC AUTO: 100 FL (ref 80–100)
MONOCYTES # BLD AUTO: 0.32 X10*3/UL (ref 0.05–0.8)
MONOCYTES NFR BLD AUTO: 6.4 %
NEUTROPHILS # BLD AUTO: 2.95 X10*3/UL (ref 1.6–5.5)
NEUTROPHILS NFR BLD AUTO: 58.6 %
PLATELET # BLD AUTO: 206 X10*3/UL (ref 150–450)
POTASSIUM SERPL-SCNC: 3.6 MMOL/L (ref 3.5–5.3)
PROT SERPL-MCNC: 7.4 G/DL (ref 6.4–8.2)
RBC # BLD AUTO: 4.19 X10*6/UL (ref 4.5–5.9)
SODIUM SERPL-SCNC: 141 MMOL/L (ref 136–145)
TIBC SERPL-MCNC: 340 UG/DL (ref 240–445)
UIBC SERPL-MCNC: 199 UG/DL (ref 110–370)
VIT B12 SERPL-MCNC: 546 PG/ML (ref 211–911)
WBC # BLD AUTO: 5 X10*3/UL (ref 4.4–11.3)

## 2024-04-30 PROCEDURE — 83550 IRON BINDING TEST: CPT | Mod: 91

## 2024-04-30 PROCEDURE — 36415 COLL VENOUS BLD VENIPUNCTURE: CPT

## 2024-04-30 PROCEDURE — 82728 ASSAY OF FERRITIN: CPT

## 2024-04-30 PROCEDURE — 85025 COMPLETE CBC W/AUTO DIFF WBC: CPT

## 2024-04-30 PROCEDURE — 80053 COMPREHEN METABOLIC PANEL: CPT

## 2024-04-30 PROCEDURE — 82607 VITAMIN B-12: CPT

## 2024-04-30 RX ORDER — TIZANIDINE 4 MG/1
4 TABLET ORAL 3 TIMES DAILY
Qty: 270 TABLET | Refills: 1 | Status: SHIPPED | OUTPATIENT
Start: 2024-04-30

## 2024-04-30 RX ORDER — MONTELUKAST SODIUM 10 MG/1
10 TABLET ORAL DAILY
Qty: 90 TABLET | Refills: 1 | Status: SHIPPED | OUTPATIENT
Start: 2024-04-30

## 2024-04-30 RX ORDER — TIRZEPATIDE 7.5 MG/.5ML
INJECTION, SOLUTION SUBCUTANEOUS
Refills: 3 | OUTPATIENT
Start: 2024-04-30

## 2024-04-30 RX ORDER — ACYCLOVIR 800 MG/1
800 TABLET ORAL DAILY
Qty: 90 TABLET | Refills: 1 | Status: SHIPPED | OUTPATIENT
Start: 2024-04-30

## 2024-04-30 NOTE — TELEPHONE ENCOUNTER
Mr. Larson called the office with compliant of his script not being at the mail order pharmacy [facility] and he was out of the medication   tirzepatide (Mounjaro) 7.5 mg/0.5 mL pen injector for 2 days.  Advised I will call the pharmacy to find out what happed, as the script was sent on 4/18/24.  Spoke with Desire from [facility] and she advised medication out of stock, on back order from manufacture.  Asked if patient was notified, not sure from Desire.  Desire advised shortage on   tirzepatide (Mounjaro) 7.5 mg/0.5 mL pen injector  everywhere.  Dr. Corbett do you want patient to take something else?

## 2024-04-30 NOTE — PROGRESS NOTES
Patient ID: Elliot Larson is a 74 y.o. male.      Subjective    HPI    Mr. Larson is a 71 y/o M with a history of hypertension, diabetes, recurrent UTIs, gastric bypass and BPH who presents for follow-up for anemia.     Reviewing patient's records patient is noted to have an anemia since at least every 2018 when his hemoglobin was 13. More recently he is noted to have a hemoglobin of 12.1. He denies any obvious blood loss in the stool or urine. States colonoscopy which  was completed last in November 2015 was normal. She also underwent an EGD at that time which was also normal. Patient underwent hernia repair in September 2019 and denies any significant blood loss in need of blood transfusions. Initial visit 12/4/19  started on vitamin B12 supplements.      Interval Events:      2/20/23: Patient presents for follow-up for anemia. Blood work today shows a hemoglobin of 12.6 with no other cytopenias. Hemoglobin has been stable for some time. Patient has had several falls. He reports depression related to personal issues and he  would like a referral to a psychologist.        11/14/23: Labs done yesterday show WBC 4.6, hgb 13.9, plt 153,000, . Has had macrycytosis since at least Dec of 2020.  Since last visit patient had a fall and broke his hip in June 2023, he was hospitalized and had surgery followed by a stay at a rehab facility. He reports he is now feeling very well and energy is good. He denies signs or symptoms of bleeding other than mild with oral care at times. He is taking Monjuro so his appetite is decreased. He denies infections, fevers, chills, GI symptoms, SOB, chest pain or edema. He is walking with a walker and enjoys spending time with his dog and has a job interview tomorrow. He gets lunch delivered daily. He is due for pancreas MRI and last PCP visit was in August, due for colonoscopy in 2025.    5/1/2024: Presents for follow up visit and to review lab work.        PMHx: hypertension,  "diabetes, recurrent UTIs and BPH, pancreatic cyst     PSHx: gastric bypass     Social Hx: Patient is a former smoker. Denies any illicit drug use. Patient is single. He has no children.      Objective    Visit Vitals  /71 Comment: nurse notified   Pulse (!) 114 Comment: nurse notified   Temp 36.6 °C (97.9 °F)   Resp 16   Wt 92.2 kg (203 lb 4.2 oz)   SpO2 96%   BMI 30.91 kg/m²   Smoking Status Former   BSA 2.1 m²     Says his HR is elevated from having his dog with him today.     Review of Systems   Constitutional:  Negative for chills, diaphoresis, fatigue, fever and unexpected weight change.   HENT:  Negative.  Negative for lump/mass and trouble swallowing.    Eyes: Negative.    Respiratory: Negative.  Negative for chest tightness, cough and shortness of breath.    Cardiovascular: Negative.    Gastrointestinal:  Negative for abdominal pain, nausea and vomiting.        Denies early satiety. Alternates between constipation and diarrhea. Occasional \"drops\" of blood from hemorrhoids      Genitourinary: Negative.     Musculoskeletal: Negative.         Left leg/knee pain intermittently    Skin: Negative.    Neurological: Negative.  Negative for numbness.   Hematological: Negative.  Negative for adenopathy.   Psychiatric/Behavioral: Negative.     All other systems reviewed and are negative.         Physical Exam  Vitals reviewed.   Constitutional:       Appearance: Normal appearance.   HENT:      Head: Normocephalic and atraumatic.      Nose: Nose normal.      Mouth/Throat:      Mouth: Mucous membranes are moist.      Pharynx: Oropharynx is clear.   Eyes:      Extraocular Movements: Extraocular movements intact.      Conjunctiva/sclera: Conjunctivae normal.      Pupils: Pupils are equal, round, and reactive to light.   Cardiovascular:      Rate and Rhythm: Normal rate and regular rhythm.      Pulses: Normal pulses.      Heart sounds: Normal heart sounds.   Pulmonary:      Effort: Pulmonary effort is normal.      " Breath sounds: Normal breath sounds.   Abdominal:      General: Bowel sounds are normal.      Palpations: Abdomen is soft.   Musculoskeletal:         General: Normal range of motion.      Cervical back: Normal range of motion.      Comments: Using walker   Skin:     General: Skin is warm and dry.   Neurological:      General: No focal deficit present.      Mental Status: He is alert and oriented to person, place, and time.   Psychiatric:         Mood and Affect: Mood normal.         Behavior: Behavior normal.         Thought Content: Thought content normal.         Judgment: Judgment normal.         Performance Status:  Asymptomatic    Labs:  Lab Results   Component Value Date    WBC 5.0 04/30/2024    NEUTROABS 2.95 04/30/2024    IGABSOL 0.01 04/30/2024    LYMPHSABS 1.50 04/30/2024    MONOSABS 0.32 04/30/2024    EOSABS 0.23 04/30/2024    BASOSABS 0.02 04/30/2024    RBC 4.19 (L) 04/30/2024     04/30/2024    MCHC 34.4 04/30/2024    HGB 14.4 04/30/2024    HCT 41.9 04/30/2024     04/30/2024     Lab Results   Component Value Date    CREATININE 0.95 04/30/2024    BUN 18 04/30/2024    EGFR 84 04/30/2024     04/30/2024    K 3.6 04/30/2024     (H) 04/30/2024    CO2 26 04/30/2024      Lab Results   Component Value Date    ALT 16 04/30/2024    AST 17 04/30/2024    ALKPHOS 89 04/30/2024    BILITOT 1.2 04/30/2024      Lab Results   Component Value Date    IRON 89 12/27/2023    TIBC 328 12/27/2023    FERRITIN 68 12/27/2023      Lab Results   Component Value Date    GMZSUGMU36 251 11/13/2023      Lab Results   Component Value Date    FOLATE 9.2 02/09/2022       Assessment/Plan     1. Macrocytic anemia  2. History of gastric bypass.      Pt has had some component of anemia since at least every 2018.  Discussed macrocytic anemia is often associated with folate and vitamin B12 deficiency. Started on vitamin B12 last visit and has received 6 1000mcg shots and one today.      - Most recent Hgb from 2/9/2022  while he was at the nursing facility was 8.9, likely related to post-op bleed from left knee replacement   - Hgb today is up to 12.5, however he feels significantly more fatigue and SOB, depressed mood, likely related to iron deficiency  - Will wait iron studies today with iron, TIBC, ferritin   - We will call him either today or tomorrow when those return if more IV iron is needed. If so, will plan to administer Venofer x3 and follow-up in 3 months      2/20/23:   - Hemoglobin at goal today.   - No evidence of new cytopenias.   - Discussed that he could follow with his PCP or us and he elected to follow with us. We will continue to follow for anemia.   - RTC in 6 months with labs day of.     11/14/2023:   - WBC 4.6, hgb 13.9, plt 153,000, , iron 130, ferritin 43, iron saturation 36%, vitamin B12 251  - Not taking oral B12 supplements, will increase through diet for now  - Not on oral tab supplementation and with normal hgb and iron panel he will continue with a well balanced diet  - He is feeling well and prefers to return in 3 months for repeat labs and FUV (CBCd, CMP, vitamin b12, iron panel/ferritin)  - He will call if any changes or concerns in the meantime     5/1/2024:  - Presents for follow up to review labs   - WBC 5.0, hgb 14.4, , plt 206,000, vitamin B12 546, iron saturation 41%, ferritin 78, creatinine 0.95  - Reports feeling well with no new health changes and denies concerns   - Encouraged him to let his GI physician know if his diarrhea/constipation continues   - He was given a copy of his lab work   - Reports taking vitamin B12 sporadically, I encouraged him to continue since his number is stable   - He does not endorse signs of bleeding and is not on oral iron   - Patient prefers 6 month FUV but was encouraged to call in the meantime for new symptoms and we can check his lab work early   - RTC 6 months with labs a few days early      3  Pancreatic lesion  - Followed by Dr. Arevalo  -  "Patient underwent MRI in 11/2020 and no pancreatic lesions seen  - MRCP 12/2023 showed stable pancreas per Dr. Arevalo    4. CT lung nodules  - Most recent CT on 4/18/24 showed \"no suspicious nodules\" per Dr. Corbett          Diagnoses and all orders for this visit:  Thrombocytopenia (CMS-HCC)  Macrocytic anemia  -     Clinic Appointment Request SHANTI PLASENCIA; Future  -     Ferritin; Future  -     CBC and Auto Differential; Future  -     Comprehensive metabolic panel; Future  -     Iron and TIBC; Future  -     Vitamin B12; Future      ORALIA Gracia-CNP      "

## 2024-05-01 ENCOUNTER — OFFICE VISIT (OUTPATIENT)
Dept: HEMATOLOGY/ONCOLOGY | Facility: CLINIC | Age: 74
End: 2024-05-01
Payer: MEDICARE

## 2024-05-01 VITALS
TEMPERATURE: 97.9 F | WEIGHT: 203.26 LBS | BODY MASS INDEX: 30.91 KG/M2 | SYSTOLIC BLOOD PRESSURE: 169 MMHG | HEART RATE: 114 BPM | RESPIRATION RATE: 16 BRPM | DIASTOLIC BLOOD PRESSURE: 71 MMHG | OXYGEN SATURATION: 96 %

## 2024-05-01 DIAGNOSIS — D69.6 THROMBOCYTOPENIA (CMS-HCC): Primary | ICD-10-CM

## 2024-05-01 DIAGNOSIS — D53.9 MACROCYTIC ANEMIA: ICD-10-CM

## 2024-05-01 PROCEDURE — 99214 OFFICE O/P EST MOD 30 MIN: CPT

## 2024-05-01 PROCEDURE — 3077F SYST BP >= 140 MM HG: CPT

## 2024-05-01 PROCEDURE — 4010F ACE/ARB THERAPY RXD/TAKEN: CPT

## 2024-05-01 PROCEDURE — 3008F BODY MASS INDEX DOCD: CPT

## 2024-05-01 PROCEDURE — 3078F DIAST BP <80 MM HG: CPT

## 2024-05-01 PROCEDURE — 1126F AMNT PAIN NOTED NONE PRSNT: CPT

## 2024-05-01 PROCEDURE — 1160F RVW MEDS BY RX/DR IN RCRD: CPT

## 2024-05-01 PROCEDURE — 1159F MED LIST DOCD IN RCRD: CPT

## 2024-05-01 ASSESSMENT — ENCOUNTER SYMPTOMS
EYES NEGATIVE: 1
FEVER: 0
HEMATOLOGIC/LYMPHATIC NEGATIVE: 1
VOMITING: 0
FATIGUE: 0
NUMBNESS: 0
CHILLS: 0
COUGH: 0
PSYCHIATRIC NEGATIVE: 1
DIAPHORESIS: 0
ADENOPATHY: 0
NAUSEA: 0
NEUROLOGICAL NEGATIVE: 1
UNEXPECTED WEIGHT CHANGE: 0
CHEST TIGHTNESS: 0
TROUBLE SWALLOWING: 0
MUSCULOSKELETAL NEGATIVE: 1
SHORTNESS OF BREATH: 0
ABDOMINAL PAIN: 0
RESPIRATORY NEGATIVE: 1
CARDIOVASCULAR NEGATIVE: 1

## 2024-05-01 ASSESSMENT — PAIN SCALES - GENERAL: PAINLEVEL: 0-NO PAIN

## 2024-05-14 ENCOUNTER — APPOINTMENT (OUTPATIENT)
Dept: PRIMARY CARE | Facility: CLINIC | Age: 74
End: 2024-05-14
Payer: MEDICARE

## 2024-05-16 DIAGNOSIS — I10 HYPERTENSION, UNSPECIFIED TYPE: ICD-10-CM

## 2024-05-16 RX ORDER — LISINOPRIL 40 MG/1
40 TABLET ORAL DAILY
Qty: 90 TABLET | Refills: 0 | Status: SHIPPED | OUTPATIENT
Start: 2024-05-16

## 2024-05-22 ENCOUNTER — HOSPITAL ENCOUNTER (OUTPATIENT)
Dept: CARDIOLOGY | Facility: CLINIC | Age: 74
Discharge: HOME | End: 2024-05-22
Payer: MEDICARE

## 2024-05-22 DIAGNOSIS — R93.1 ABNORMAL FINDINGS ON DIAGNOSTIC IMAGING OF HEART AND CORONARY CIRCULATION: ICD-10-CM

## 2024-05-22 DIAGNOSIS — I25.10 CORONARY ARTERY DISEASE INVOLVING NATIVE CORONARY ARTERY OF NATIVE HEART, UNSPECIFIED WHETHER ANGINA PRESENT: Primary | ICD-10-CM

## 2024-05-22 DIAGNOSIS — I10 HYPERTENSION, UNSPECIFIED TYPE: ICD-10-CM

## 2024-05-22 DIAGNOSIS — I25.10 CORONARY ARTERY DISEASE INVOLVING NATIVE CORONARY ARTERY OF NATIVE HEART, UNSPECIFIED WHETHER ANGINA PRESENT: ICD-10-CM

## 2024-05-22 DIAGNOSIS — R94.31 ABNORMAL EKG: ICD-10-CM

## 2024-05-22 PROCEDURE — 93016 CV STRESS TEST SUPVJ ONLY: CPT | Performed by: INTERNAL MEDICINE

## 2024-05-22 PROCEDURE — 2500000004 HC RX 250 GENERAL PHARMACY W/ HCPCS (ALT 636 FOR OP/ED): Performed by: INTERNAL MEDICINE

## 2024-05-22 PROCEDURE — 93018 CV STRESS TEST I&R ONLY: CPT | Performed by: INTERNAL MEDICINE

## 2024-05-22 PROCEDURE — 93017 CV STRESS TEST TRACING ONLY: CPT

## 2024-05-22 PROCEDURE — 93350 STRESS TTE ONLY: CPT | Performed by: INTERNAL MEDICINE

## 2024-05-22 RX ADMIN — PERFLUTREN 6 ML OF DILUTION: 6.52 INJECTION, SUSPENSION INTRAVENOUS at 14:05

## 2024-05-24 DIAGNOSIS — K21.9 GASTROESOPHAGEAL REFLUX DISEASE WITHOUT ESOPHAGITIS: ICD-10-CM

## 2024-05-25 RX ORDER — PANTOPRAZOLE SODIUM 20 MG/1
20 TABLET, DELAYED RELEASE ORAL DAILY
Qty: 90 TABLET | Refills: 3 | Status: SHIPPED | OUTPATIENT
Start: 2024-05-25

## 2024-06-04 ENCOUNTER — APPOINTMENT (OUTPATIENT)
Dept: PRIMARY CARE | Facility: CLINIC | Age: 74
End: 2024-06-04
Payer: MEDICARE

## 2024-06-05 ENCOUNTER — TELEPHONE (OUTPATIENT)
Dept: PRIMARY CARE | Facility: CLINIC | Age: 74
End: 2024-06-05
Payer: MEDICARE

## 2024-06-05 NOTE — TELEPHONE ENCOUNTER
I notified Pt. Elliot Larson of Dr. Corbett message. Pt. expressed understanding of the message given.     Dr. Corbett, Pt. agreed to starting a beta blocker. Pt. would also like to know the side effects of the beta blocker and his stress test results. Please contact Pt.  At [phone] regarding this message. Thanks

## 2024-07-01 ENCOUNTER — APPOINTMENT (OUTPATIENT)
Dept: PAIN MEDICINE | Facility: CLINIC | Age: 74
End: 2024-07-01
Payer: MEDICARE

## 2024-07-17 ENCOUNTER — APPOINTMENT (OUTPATIENT)
Dept: PRIMARY CARE | Facility: CLINIC | Age: 74
End: 2024-07-17
Payer: MEDICARE

## 2024-07-29 DIAGNOSIS — I10 HYPERTENSION, UNSPECIFIED TYPE: ICD-10-CM

## 2024-07-30 NOTE — TELEPHONE ENCOUNTER
Pt. left a voicemail message stating that he's calling back he  receive a call was told he could be scheduled for 8/1/24 instead of 8/13/24 Pt. also said  that he had 10 minutes to respond back. Pt. Said if he don't hear from you he's coming on 8/1/24 at 1:15 pm Call Pt back at [phone].

## 2024-07-31 RX ORDER — LISINOPRIL 40 MG/1
40 TABLET ORAL DAILY
Qty: 90 TABLET | Refills: 0 | Status: SHIPPED | OUTPATIENT
Start: 2024-07-31

## 2024-08-01 ENCOUNTER — APPOINTMENT (OUTPATIENT)
Dept: PRIMARY CARE | Facility: CLINIC | Age: 74
End: 2024-08-01
Payer: MEDICARE

## 2024-08-07 DIAGNOSIS — I10 HYPERTENSION, UNSPECIFIED TYPE: ICD-10-CM

## 2024-08-07 RX ORDER — CLONIDINE HYDROCHLORIDE 0.3 MG/1
0.3 TABLET ORAL 2 TIMES DAILY
Qty: 60 TABLET | Refills: 0 | Status: SHIPPED | OUTPATIENT
Start: 2024-08-07

## 2024-08-13 ENCOUNTER — APPOINTMENT (OUTPATIENT)
Dept: PRIMARY CARE | Facility: CLINIC | Age: 74
End: 2024-08-13
Payer: MEDICARE

## 2024-08-16 ENCOUNTER — APPOINTMENT (OUTPATIENT)
Dept: OPHTHALMOLOGY | Facility: CLINIC | Age: 74
End: 2024-08-16
Payer: MEDICARE

## 2024-08-19 ENCOUNTER — APPOINTMENT (OUTPATIENT)
Dept: PRIMARY CARE | Facility: CLINIC | Age: 74
End: 2024-08-19
Payer: MEDICARE

## 2024-08-27 ENCOUNTER — OFFICE VISIT (OUTPATIENT)
Dept: UROLOGY | Facility: HOSPITAL | Age: 74
End: 2024-08-27
Payer: MEDICARE

## 2024-08-27 DIAGNOSIS — R39.15 URINARY URGENCY: ICD-10-CM

## 2024-08-27 DIAGNOSIS — N40.0 BENIGN PROSTATIC HYPERPLASIA, UNSPECIFIED WHETHER LOWER URINARY TRACT SYMPTOMS PRESENT: Primary | ICD-10-CM

## 2024-08-27 DIAGNOSIS — N39.41 URGE INCONTINENCE OF URINE: ICD-10-CM

## 2024-08-27 LAB
POC APPEARANCE, URINE: CLEAR
POC BILIRUBIN, URINE: NEGATIVE
POC BLOOD, URINE: NEGATIVE
POC COLOR, URINE: YELLOW
POC GLUCOSE, URINE: NEGATIVE MG/DL
POC KETONES, URINE: NEGATIVE MG/DL
POC LEUKOCYTES, URINE: NEGATIVE
POC NITRITE,URINE: NEGATIVE
POC PH, URINE: 5 PH
POC PROTEIN, URINE: NEGATIVE MG/DL
POC SPECIFIC GRAVITY, URINE: 1.02
POC UROBILINOGEN, URINE: 0.2 EU/DL

## 2024-08-27 PROCEDURE — 99214 OFFICE O/P EST MOD 30 MIN: CPT | Performed by: UROLOGY

## 2024-08-27 PROCEDURE — 81003 URINALYSIS AUTO W/O SCOPE: CPT | Mod: QW | Performed by: UROLOGY

## 2024-08-27 PROCEDURE — 4010F ACE/ARB THERAPY RXD/TAKEN: CPT | Performed by: UROLOGY

## 2024-08-27 NOTE — PROGRESS NOTES
FUV    Last seen - 3/1/23     HISTORY OF PRESENT ILLNESS:   Elliot Larson is a 74 y.o. male who is being seen today for fuv    h/o UTIs/prostatitis, BPH s/p Urolift, h/o ED, s/p IPP, presenting for urinary incontinence.      Patient underwent IPP placement on 12/20/19. AMS Cx 18+3, MS pump, 100cc conceal reservoir.      Pt with bothersome urge incontinence. Stream ok at times. PVRs have been low. UA's negative except for glucose.      Trial of mybetriq at prior visit, also tried gemteza. Currently on oxybutynin     Cysto (1.14.22) - no strictures, non obstructive prostate, normal bladder    Has fallen and broke his femur since last visit.  Very poor mobility.      PAST MEDICAL HISTORY:  Past Medical History:   Diagnosis Date    Age-related nuclear cataract, left eye 01/13/2020    Age-related nuclear cataract of left eye    Central retinal vein occlusion, unspecified eye, stable (CMS-HCC) 03/16/2016    CRVO (central retinal vein occlusion)    Cyst of pancreas ([facility]) 09/04/2020    Pancreatic cyst    Disorder of lipoprotein metabolism, unspecified     Disorder of lipoid metabolism    Edema, unspecified 12/19/2017    Dependent edema    Edema, unspecified 12/19/2017    Dependent edema    Enterocolitis due to Clostridium difficile, not specified as recurrent 06/14/2022    Clostridium difficile diarrhea    Essential (primary) hypertension 03/14/2016    Benign essential HTN    Essential (primary) hypertension 03/16/2016    Benign essential HTN    Headache, unspecified 03/16/2016    Acute headache    Meibomian gland dysfunction right eye, upper and lower eyelids 06/15/2016    Meibomian gland dysfunction (MGD), bilateral, both upper and lower lids    Myopia, bilateral 10/03/2022    Myopia of both eyes with astigmatism and presbyopia    Ocular pain, right eye 07/02/2019    Ocular pain, right eye    Other conditions influencing health status     Accident    Other conditions influencing health status     Herniated Disc  (L4 - L5) Central    Other conditions influencing health status     Herniated Disc (C5 - C6)    Other intervertebral disc degeneration, lumbosacral region     Disc degeneration, lumbosacral    Other specified abnormal findings of blood chemistry 04/29/2021    Creatinine elevation    Other specified postprocedural states 05/01/2019    History of vitrectomy    Pain in unspecified limb     Limb pain    Pain in unspecified limb     Limb pain    Personal history of other diseases of the circulatory system     History of hypertension    Personal history of other diseases of the circulatory system     History of hypertension    Personal history of other diseases of the musculoskeletal system and connective tissue     History of backache    Personal history of other diseases of the nervous system and sense organs     History of central retinal vein occlusion    Personal history of other endocrine, nutritional and metabolic disease     History of obesity    Personal history of other specified conditions 10/30/2019    History of emergence delirium    Personal history of other specified conditions 06/15/2022    History of diarrhea    Presence of intraocular lens 10/03/2022    Pseudophakia of right eye    Type 2 diabetes mellitus with unspecified diabetic retinopathy without macular edema (Multi) 02/26/2015    Diabetic retinopathy of both eyes    Type 2 diabetes mellitus without complications (Multi) 03/16/2016    Diabetes mellitus    Unspecified asthma, uncomplicated ([facility]) 04/03/2020    Asthmatic bronchitis    Unspecified blepharitis right eye, unspecified eyelid 06/15/2016    Blepharitis of both eyes    Unspecified cataract     Cataract of both eyes    Unspecified keratitis 01/13/2020    Right keratitis       PAST SURGICAL HISTORY:  Past Surgical History:   Procedure Laterality Date    CATARACT EXTRACTION  11/13/2012    Cataract Surgery    CERVICAL LAMINECTOMY  09/24/2018    Laminectomy Cervical    GASTRIC BYPASS   "11/13/2012    Gastric Surgery For Morbid Obesity Gastric Bypass    HERNIA REPAIR  11/13/2012    Inguinal Hernia Repair    HERNIA REPAIR  07/15/2013    Hernia Repair    LUMBAR FUSION  07/15/2013    Lumbar Vertebral Fusion    LUMBAR FUSION  07/16/2013    Lumbar Vertebral Fusion    LUMBAR LAMINECTOMY  07/15/2013    Laminectomy Lumbar    LUMBAR LAMINECTOMY  06/06/2013    Laminectomy Lumbar    MR HEAD ANGIO WO IV CONTRAST  4/5/2021    MR HEAD ANGIO WO IV CONTRAST 4/5/2021 [facility]    OTHER SURGICAL HISTORY  10/30/2019    Inguinal hernia repair    OTHER SURGICAL HISTORY  08/15/2019    Knee replacement    OTHER SURGICAL HISTORY  05/13/2019    Colonoscopy    OTHER SURGICAL HISTORY  12/23/2019    Penile surgery    SEPTOPLASTY  11/13/2012    Septoplasty        ALLERGIES:   Allergies   Allergen Reactions    Ciprofloxacin Itching    Ketorolac Unknown    Latex Unknown    Penicillins Unknown        MEDICATIONS:   Current Outpatient Medications   Medication Instructions    acetaminophen (Tylenol) 500 mg tablet oral, Every 6 hours    acyclovir (ZOVIRAX) 800 mg, oral, Daily    albuterol 90 mcg/actuation inhaler 2 puffs, inhalation, Every 4 hours PRN    albuterol 0.63 mg, nebulization, Every 6 hours PRN    cholecalciferol (Vitamin D-3) 50 MCG (2000 UT) tablet 2 tablets, oral, 2 times daily    cloNIDine (CATAPRES) 0.3 mg, oral, 2 times daily    cyanocobalamin (VITAMIN B-12) 1,000 mcg, oral, Daily RT    DULoxetine (CYMBALTA) 30 mg, oral, Daily, Do not crush or chew.    DULoxetine (CYMBALTA) 60 mg, oral, Daily, Do not crush or chew.    hydrOXYzine HCL (ATARAX) 25 mg, oral, 3 times daily    insulin syr/ndl U100 half mario 0.5 mL 31 gauge x 5/16\" syringe miscellaneous, With meals    latanoprost (Xalatan) 0.005 % ophthalmic solution 1 drop, Left Eye, Nightly    lisinopril 40 mg, oral, Daily    melatonin 3 mg tablet 1 tablet, oral, Nightly    metFORMIN XR (GLUCOPHAGE-XR) 2,000 mg, oral, Daily with evening meal    montelukast " (SINGULAIR) 10 mg, oral, Daily    Mounjaro 7.5 mg, subcutaneous, Once Weekly    OneTouch Ultra Test strip 3 times daily    oxybutynin XL (Ditropan-XL) 10 mg 24 hr tablet TAKE 1 TABLET EVERY DAY    pantoprazole (PROTONIX) 20 mg, oral, Daily    rosuvastatin (CRESTOR) 10 mg, oral, Daily    tiZANidine (ZANAFLEX) 4 mg, oral, 3 times daily    walker (Ultra-Light Rollator) misc Use for ambulation        PHYSICAL EXAM:  There were no vitals taken for this visit.  Constitutional: Patient appears well-developed and well-nourished. No distress.    Pulmonary/Chest: Effort normal. No respiratory distress.   Abdominal: Soft, ND NT  : IPP in good position, cycles   Musculoskeletal: Normal range of motion.    Neurological: Alert and oriented to person, place, and time.  Psychiatric: Normal mood and affect. Behavior is normal. Thought content normal.      Labs  Lab Results   Component Value Date    TESTOSTERONE 399 08/28/2020    TESTOSTERONE 263 08/26/2020     Lab Results   Component Value Date    PSA 1.54 09/08/2023     Lab Results   Component Value Date    GFRMALE 81 09/08/2023     Lab Results   Component Value Date    CREATININE 0.95 04/30/2024     Lab Results   Component Value Date    CHOL 131 09/08/2023     Lab Results   Component Value Date    HDL 85.4 09/08/2023     Lab Results   Component Value Date    CHHDL 1.5 09/08/2023     Lab Results   Component Value Date    LDLF 34 09/08/2023     Lab Results   Component Value Date    VLDL 12 09/08/2023     Lab Results   Component Value Date    TRIG 59 09/08/2023     Lab Results   Component Value Date    HGBA1C 6.0 (A) 09/08/2023     Lab Results   Component Value Date    HCT 41.9 04/30/2024     UA neg  PVR 0      Assessment:      1. Benign prostatic hyperplasia, unspecified whether lower urinary tract symptoms present  Measure post void residual    POCT UA Automated manually resulted      2. Urge incontinence of urine        3. Urinary urgency            Elliot Larson is a 74  y.o. male here for FUV     Plan:   1)  Will schedule for in office PNE    Patient continues to have bothersome urinary symptoms despite conservative measures and had tried at least 2 medications (anti-cholinergic or beta-agonist).  We discussed OAB pathway as above.  We went into detail about [employer] neuromodulation.  We discussed that this is a long-term treatment that helps to restore communication between the brain, bladder and bowel.    We discussed the peripheral nerve evaluation (PNE).  That it would be done in the office setting with local anesthetic.  Two tiny wires, or leads, would be inserted in the upper buttock.  This would be guided by bony landmarks and patients sensory responses.  Once we confirmed the correct location the leads would be connected to an external device and secured to the patients back.  They would return home with no major restrictions, just no showering or strenuous activity.  They will keep a voiding diary to monitor their responses. They will follow up in office the week after to have the leads removed and review the voiding diary.  A successful trial is deemed if >50% response.    If we decide to move forward with permanent implant that will be done in the operating room with light sedation.  A permanent lead would be placed under fluoroscopic guidance to confirm correct positioning.  This is then connected to an internal pulse generator which can last up to 20 years.    Risks of change in sensation, pain, irritation, bleeding, movement of the lead and implant infection discussed.    Patient in agreement and wants to proceed.

## 2024-08-28 DIAGNOSIS — I10 HYPERTENSION, UNSPECIFIED TYPE: ICD-10-CM

## 2024-08-29 RX ORDER — CLONIDINE HYDROCHLORIDE 0.3 MG/1
0.3 TABLET ORAL 2 TIMES DAILY
Qty: 30 TABLET | Refills: 0 | Status: SHIPPED | OUTPATIENT
Start: 2024-08-29

## 2024-09-04 ENCOUNTER — APPOINTMENT (OUTPATIENT)
Dept: PRIMARY CARE | Facility: CLINIC | Age: 74
End: 2024-09-04
Payer: MEDICARE

## 2024-09-04 VITALS
BODY MASS INDEX: 33.75 KG/M2 | SYSTOLIC BLOOD PRESSURE: 146 MMHG | DIASTOLIC BLOOD PRESSURE: 80 MMHG | OXYGEN SATURATION: 98 % | WEIGHT: 222 LBS | HEART RATE: 92 BPM

## 2024-09-04 DIAGNOSIS — H34.8192 HISTORY OF CENTRAL RETINAL VEIN OCCLUSION (CMS-HCC): ICD-10-CM

## 2024-09-04 DIAGNOSIS — M96.1 POSTLAMINECTOMY SYNDROME, LUMBAR: ICD-10-CM

## 2024-09-04 DIAGNOSIS — N39.41 URGE INCONTINENCE OF URINE: ICD-10-CM

## 2024-09-04 DIAGNOSIS — E78.5 DYSLIPIDEMIA: ICD-10-CM

## 2024-09-04 DIAGNOSIS — H18.49: ICD-10-CM

## 2024-09-04 DIAGNOSIS — M19.09 OSTEOARTHRITIS OF OTHER SITE, UNSPECIFIED OSTEOARTHRITIS TYPE: ICD-10-CM

## 2024-09-04 DIAGNOSIS — I87.2 CHRONIC VENOUS INSUFFICIENCY: ICD-10-CM

## 2024-09-04 DIAGNOSIS — F51.01 PRIMARY INSOMNIA: ICD-10-CM

## 2024-09-04 DIAGNOSIS — I73.9 PAD (PERIPHERAL ARTERY DISEASE) (CMS-HCC): ICD-10-CM

## 2024-09-04 DIAGNOSIS — H43.819 POSTERIOR VITREOUS DETACHMENT, UNSPECIFIED LATERALITY: ICD-10-CM

## 2024-09-04 DIAGNOSIS — F40.240 CLAUSTROPHOBIA: ICD-10-CM

## 2024-09-04 DIAGNOSIS — Z12.5 SCREENING PSA (PROSTATE SPECIFIC ANTIGEN): ICD-10-CM

## 2024-09-04 DIAGNOSIS — M50.10 HERNIATION OF CERVICAL INTERVERTEBRAL DISC WITH RADICULOPATHY: ICD-10-CM

## 2024-09-04 DIAGNOSIS — K86.9 LESION OF PANCREAS (HHS-HCC): ICD-10-CM

## 2024-09-04 DIAGNOSIS — E55.9 VITAMIN D DEFICIENCY: ICD-10-CM

## 2024-09-04 DIAGNOSIS — K21.9 GASTROESOPHAGEAL REFLUX DISEASE WITHOUT ESOPHAGITIS: ICD-10-CM

## 2024-09-04 DIAGNOSIS — N32.81 OVERACTIVE BLADDER: ICD-10-CM

## 2024-09-04 DIAGNOSIS — N40.1 BENIGN PROSTATIC HYPERPLASIA WITH LOWER URINARY TRACT SYMPTOMS, SYMPTOM DETAILS UNSPECIFIED: ICD-10-CM

## 2024-09-04 DIAGNOSIS — K86.1 CHRONIC PANCREATITIS, UNSPECIFIED PANCREATITIS TYPE (MULTI): ICD-10-CM

## 2024-09-04 DIAGNOSIS — R26.9 GAIT DISORDER: ICD-10-CM

## 2024-09-04 DIAGNOSIS — R07.81 RIB PAIN ON RIGHT SIDE: ICD-10-CM

## 2024-09-04 DIAGNOSIS — Z98.84 HISTORY OF GASTRIC BYPASS: ICD-10-CM

## 2024-09-04 DIAGNOSIS — K40.90 INGUINAL HERNIA, LEFT: ICD-10-CM

## 2024-09-04 DIAGNOSIS — E85.9: ICD-10-CM

## 2024-09-04 DIAGNOSIS — M54.16 LEFT LUMBAR RADICULOPATHY: ICD-10-CM

## 2024-09-04 DIAGNOSIS — Z96.0 S/P INSERTION OF PENILE IMPLANT: ICD-10-CM

## 2024-09-04 DIAGNOSIS — L29.9 GENERALIZED PRURITUS: ICD-10-CM

## 2024-09-04 DIAGNOSIS — E11.9 TYPE 2 DIABETES MELLITUS WITHOUT COMPLICATION, WITHOUT LONG-TERM CURRENT USE OF INSULIN (MULTI): ICD-10-CM

## 2024-09-04 DIAGNOSIS — H54.8 LEGALLY BLIND IN RIGHT EYE, AS DEFINED IN USA: ICD-10-CM

## 2024-09-04 DIAGNOSIS — R39.15 URINARY URGENCY: ICD-10-CM

## 2024-09-04 DIAGNOSIS — F32.89 OTHER DEPRESSION: ICD-10-CM

## 2024-09-04 DIAGNOSIS — I10 HYPERTENSION, UNSPECIFIED TYPE: Primary | ICD-10-CM

## 2024-09-04 DIAGNOSIS — H34.8112 CENTRAL RETINAL VEIN OCCLUSION OF RIGHT EYE, UNSPECIFIED COMPLICATION STATUS (CMS-HCC): ICD-10-CM

## 2024-09-04 DIAGNOSIS — E11.9 DIABETES MELLITUS WITHOUT OPHTHALMIC MANIFESTATIONS (MULTI): ICD-10-CM

## 2024-09-04 DIAGNOSIS — R10.9 FLANK PAIN, ACUTE: ICD-10-CM

## 2024-09-04 DIAGNOSIS — M72.0 DUPUYTREN'S CONTRACTURE OF LEFT HAND: ICD-10-CM

## 2024-09-04 DIAGNOSIS — J30.9 ALLERGIC RHINITIS, UNSPECIFIED SEASONALITY, UNSPECIFIED TRIGGER: ICD-10-CM

## 2024-09-04 DIAGNOSIS — D75.89 MACROCYTOSIS: ICD-10-CM

## 2024-09-04 DIAGNOSIS — M51.36 DISC DEGENERATION, LUMBAR: ICD-10-CM

## 2024-09-04 DIAGNOSIS — S72.142A INTERTROCHANTERIC FRACTURE OF LEFT FEMUR, CLOSED, INITIAL ENCOUNTER (MULTI): ICD-10-CM

## 2024-09-04 DIAGNOSIS — M18.11 PRIMARY OSTEOARTHRITIS OF FIRST CARPOMETACARPAL JOINT OF RIGHT HAND: ICD-10-CM

## 2024-09-04 DIAGNOSIS — J45.909 ASTHMA, UNSPECIFIED ASTHMA SEVERITY, UNSPECIFIED WHETHER COMPLICATED, UNSPECIFIED WHETHER PERSISTENT (HHS-HCC): ICD-10-CM

## 2024-09-04 PROBLEM — T81.10XA: Status: RESOLVED | Noted: 2022-02-01 | Resolved: 2024-09-04

## 2024-09-04 PROBLEM — E66.9 OBESITY: Status: RESOLVED | Noted: 2023-03-11 | Resolved: 2024-09-04

## 2024-09-04 PROBLEM — E66.812 OBESITY, CLASS II, BMI 35-39.9: Status: RESOLVED | Noted: 2022-02-02 | Resolved: 2024-09-04

## 2024-09-04 PROBLEM — E66.9 OBESITY, CLASS II, BMI 35-39.9: Status: RESOLVED | Noted: 2022-02-02 | Resolved: 2024-09-04

## 2024-09-04 PROBLEM — H25.11 CATARACT, NUCLEAR SCLEROTIC, RIGHT EYE: Status: RESOLVED | Noted: 2023-03-11 | Resolved: 2024-09-04

## 2024-09-04 LAB — POC FINGERSTICK BLOOD GLUCOSE: 213 MG/DL (ref 70–100)

## 2024-09-04 PROCEDURE — G2211 COMPLEX E/M VISIT ADD ON: HCPCS | Performed by: PEDIATRICS

## 2024-09-04 PROCEDURE — 82043 UR ALBUMIN QUANTITATIVE: CPT

## 2024-09-04 PROCEDURE — 90662 IIV NO PRSV INCREASED AG IM: CPT | Performed by: PEDIATRICS

## 2024-09-04 PROCEDURE — 3077F SYST BP >= 140 MM HG: CPT | Performed by: PEDIATRICS

## 2024-09-04 PROCEDURE — 87086 URINE CULTURE/COLONY COUNT: CPT

## 2024-09-04 PROCEDURE — 99214 OFFICE O/P EST MOD 30 MIN: CPT | Performed by: PEDIATRICS

## 2024-09-04 PROCEDURE — 82570 ASSAY OF URINE CREATININE: CPT

## 2024-09-04 PROCEDURE — G0008 ADMIN INFLUENZA VIRUS VAC: HCPCS | Performed by: PEDIATRICS

## 2024-09-04 PROCEDURE — 82962 GLUCOSE BLOOD TEST: CPT | Performed by: PEDIATRICS

## 2024-09-04 PROCEDURE — 3079F DIAST BP 80-89 MM HG: CPT | Performed by: PEDIATRICS

## 2024-09-04 PROCEDURE — 4010F ACE/ARB THERAPY RXD/TAKEN: CPT | Performed by: PEDIATRICS

## 2024-09-04 PROCEDURE — 3060F POS MICROALBUMINURIA REV: CPT | Performed by: PEDIATRICS

## 2024-09-04 RX ORDER — TIRZEPATIDE 7.5 MG/.5ML
7.5 INJECTION, SOLUTION SUBCUTANEOUS
Qty: 6 ML | Refills: 0 | Status: SHIPPED | OUTPATIENT
Start: 2024-09-08

## 2024-09-04 RX ORDER — LIDOCAINE 50 MG/G
1 PATCH TOPICAL DAILY
Qty: 30 PATCH | Refills: 0 | Status: SHIPPED | OUTPATIENT
Start: 2024-09-04 | End: 2025-09-04

## 2024-09-04 RX ORDER — ALBUTEROL SULFATE 0.63 MG/3ML
0.63 SOLUTION RESPIRATORY (INHALATION) EVERY 6 HOURS PRN
Qty: 25 ML | Refills: 1 | Status: SHIPPED | OUTPATIENT
Start: 2024-09-04 | End: 2025-09-04

## 2024-09-04 RX ORDER — TRAMADOL HYDROCHLORIDE 50 MG/1
50 TABLET ORAL EVERY 8 HOURS PRN
Qty: 18 TABLET | Refills: 0 | Status: SHIPPED | OUTPATIENT
Start: 2024-09-04 | End: 2024-09-10

## 2024-09-04 ASSESSMENT — ENCOUNTER SYMPTOMS
DIARRHEA: 0
NAUSEA: 0
PALPITATIONS: 0
DIFFICULTY URINATING: 0
EYE PAIN: 0
SHORTNESS OF BREATH: 0
ABDOMINAL PAIN: 0
NUMBNESS: 0
CHILLS: 0
BLOOD IN STOOL: 0
VOMITING: 0
WEAKNESS: 0
RHINORRHEA: 1
FEVER: 0
CONSTIPATION: 0
FATIGUE: 0
COUGH: 0
DIZZINESS: 0
CHEST TIGHTNESS: 1
ARTHRALGIAS: 0
DYSURIA: 0

## 2024-09-04 NOTE — PATIENT INSTRUCTIONS
Anger by Dr. Kristopher Tanner  How to Win Over Depression by Dr Skinny Holland  Return 3 months  Take Clonidine TWICE daily

## 2024-09-04 NOTE — PROGRESS NOTES
Subjective   Patient ID: Elliot Larson is a 74 y.o. male who presents for DM    HPI   Previously fell and broke right sided ribs. He is having some right sided flank pain just below ribs that can extend down to femoral region. Pain worses with coughing.     He also notes that the duloxetine is not helping with symptoms. He also has not been taking his metformin due to reading stuff about it as well as his A1c not increasing.     Colonoscopy due 2025  Review of Systems   Constitutional:  Negative for chills, fatigue and fever.   HENT:  Positive for rhinorrhea. Negative for congestion.    Eyes:  Negative for pain.   Respiratory:  Positive for chest tightness. Negative for cough and shortness of breath.    Cardiovascular:  Positive for leg swelling (Left leg). Negative for chest pain and palpitations.   Gastrointestinal:  Negative for abdominal pain, blood in stool, constipation, diarrhea, nausea and vomiting.   Genitourinary:  Negative for difficulty urinating and dysuria.   Musculoskeletal:  Negative for arthralgias.   Skin:  Negative for rash.   Neurological:  Negative for dizziness, weakness and numbness.       Objective   /80   Pulse 92   Wt 101 kg (222 lb)   SpO2 98%   BMI 33.75 kg/m²     Physical Exam  Constitutional:       Appearance: Normal appearance. He is normal weight.   HENT:      Head: Normocephalic and atraumatic.      Nose: Nose normal.      Mouth/Throat:      Mouth: Mucous membranes are moist.      Pharynx: Oropharynx is clear.   Eyes:      Extraocular Movements: Extraocular movements intact.      Conjunctiva/sclera: Conjunctivae normal.   Cardiovascular:      Rate and Rhythm: Normal rate and regular rhythm.      Pulses: Normal pulses.      Heart sounds: Normal heart sounds.   Pulmonary:      Effort: Pulmonary effort is normal.   Abdominal:      General: Abdomen is flat. Bowel sounds are normal.   Musculoskeletal:      Cervical back: Normal range of motion.   Skin:     General: Skin is  warm and dry.      Capillary Refill: Capillary refill takes 2 to 3 seconds.   Neurological:      General: No focal deficit present.      Mental Status: He is alert and oriented to person, place, and time.   Psychiatric:         Mood and Affect: Mood normal.         Behavior: Behavior normal.         Thought Content: Thought content normal.         Judgment: Judgment normal.         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    Asthma ([facility]) J45.909    Relevant Medications    albuterol 0.63 mg/3 mL nebulizer solution    Legally blind in right eye, as defined in [place] H54.8    Central retinal vein occlusion of right eye (CMS-HCC) H34.8112    Chronic venous insufficiency I87.2    RESOLVED: Claustrophobia F40.240    Depression F32.A    Disc degeneration, lumbar M51.36    Dupuytren's contracture of left hand M72.0    Dyslipidemia E78.5    Relevant Orders    Comprehensive Metabolic Panel    Lipid Panel    Enlarged prostate with lower urinary tract symptoms (LUTS) N40.1    GERD (gastroesophageal reflux disease) K21.9    Herniation of cervical intervertebral disc with radiculopathy M50.10    History of gastric bypass Z98.84    S/P insertion of penile implant Z96.0    Hypertension - Primary I10     Was only taking Clonidine at night         RESOLVED: Inguinal hernia, left K40.90    Insomnia G47.00    Diabetes mellitus without ophthalmic manifestations (Multi) E11.9     Last eye appt; lost vision from CRVO         Relevant Medications    tirzepatide (Mounjaro) 7.5 mg/0.5 mL pen injector (Start on 9/8/2024)    Other Relevant Orders    Albumin-Creatinine Ratio, Urine Random (Completed)    POCT fingerstick glucose manually resulted (Completed)    Hemoglobin A1C    Lesion of pancreas ([facility]) K86.9    RESOLVED: Left lumbar radiculopathy M54.16    Macrocytosis D75.89     Sees hematology         Osteoarthritis  M19.90    PAD (peripheral artery disease) (CMS-HCC) I73.9    Postlaminectomy syndrome, lumbar M96.1    Primary osteoarthritis of first carpometacarpal joint of right hand M18.11    PVD (posterior vitreous detachment) H43.819    Urge incontinence of urine N39.41    Urinary urgency R39.15    Relevant Orders    Urine Culture    Vitamin D deficiency E55.9    Gait disorder R26.9    RESOLVED: Chronic pancreatitis (Multi) K86.1    RESOLVED: Diabetes mellitus (Multi) E11.9    Generalized pruritus L29.9    History of central retinal vein occlusion (CMS-HCC) H34.8192    Intertrochanteric fracture of left femur, closed, initial encounter (Multi) S72.142A    Overactive bladder N32.81     Other Visit Diagnoses         Codes    Screening PSA (prostate specific antigen)     Z12.5    Relevant Orders    PSA    Flank pain, acute     R10.9    Relevant Orders    POCT UA (Automated) docked device    Rib pain on right side     R07.81    Relevant Medications    lidocaine (Lidoderm) 5 % patch    traMADol (Ultram) 50 mg tablet

## 2024-09-05 DIAGNOSIS — E11.21 DIABETIC NEPHROPATHY ASSOCIATED WITH TYPE 2 DIABETES MELLITUS (MULTI): Primary | ICD-10-CM

## 2024-09-05 LAB
BACTERIA UR CULT: NORMAL
CREAT UR-MCNC: 121.1 MG/DL (ref 20–370)
MICROALBUMIN UR-MCNC: 105.8 MG/L
MICROALBUMIN/CREAT UR: 87.4 UG/MG CREAT

## 2024-09-06 ENCOUNTER — PROCEDURE VISIT (OUTPATIENT)
Dept: UROLOGY | Facility: HOSPITAL | Age: 74
End: 2024-09-06
Payer: MEDICARE

## 2024-09-06 DIAGNOSIS — R39.15 URINARY URGENCY: ICD-10-CM

## 2024-09-06 DIAGNOSIS — N39.41 URGE INCONTINENCE OF URINE: Primary | ICD-10-CM

## 2024-09-06 PROCEDURE — 64561 IMPLANT NEUROELECTRODES: CPT | Performed by: UROLOGY

## 2024-09-06 PROCEDURE — 64561 IMPLANT NEUROELECTRODES: CPT | Mod: 50 | Performed by: UROLOGY

## 2024-09-06 NOTE — PROGRESS NOTES
After surgical consent was reviewed with the patient, including risks, benefits and alternatives the patient expressed desire to proceed with the basic evaluation.     The patient was then taken to the procedure room and placed in the prone position. Pillows were placed under the lower abdomen and hips to level and flatten the sacrum and allow the toes to dangle freely.  A ground pad was placed on the bottom of the patient's foot and was connected to the Clinician  along with the test stimulation cable (J-hook). The patient was then prepped and draped in the usual fashion.      Attention was turned to placement of the percutaneous lead wire using the [employer] system. The level of S3 and the medial border of the sacral foramen were using bony landmarks. After administration of local anesthesia, a foramen needle was placed in the superior, medial aspect of the S3 foramen such that the needle was parallel to medial border of the foramen. The needle was advanced such that the tip of the foramen needle was just at the anterior aspect of the sacrum. The J-hook was then placed on the uninsulated portion of the foramen needle and stimulation applied to obtain the opening threshold amplitude. Sensory response was noted to be in the seat area and reported to be comfortable.     The foramen needle was then advanced approximately 1-2 cm and retested at that depth to insure continued motor/sensory response.  the percutaneous lead was placed through the foramen needle. The foramen needle was retracted over the percutaneous lead.     The procedure was then repeated on the contralateral side.     The percutaneous leads were then coiled and a 2x2 cm gauze placed over each followed by a small Tegaderm. The PNE leads were then connected to the ground pad and basic trial cable, placing the ground pad on skin close to the Tegaderm. The basic trial cable was connected to external trial system (ETS) and impedances were checked.   A cable strain relief was created and covered with a medium to large Tegaderm. This was accomplished bilaterally. A large Tegaderm was placed to cover the connections and any exposed leads.     Patient tolerated the procedure with no complications and was instructed on the use of the patient remote in the post-procedure area.

## 2024-09-07 RX ORDER — DAPAGLIFLOZIN 10 MG/1
10 TABLET, FILM COATED ORAL DAILY
Qty: 100 TABLET | Refills: 0 | Status: SHIPPED | OUTPATIENT
Start: 2024-09-07 | End: 2025-10-12

## 2024-09-09 NOTE — PROGRESS NOTES
[facility]  Outpatient Clinic Note    Patient Name:  Elliot Larson  MRN:  [id number]  :  1950  Date of Service: 9/10/2024     Visit type: Follow up visit    HPI    Interval History:  Elliot Larson is a 74 y.o. male who is being seen today for  problems listed below.     Problem list/Chief complaints:  BPH- s/p Urolift  UTIs  Prostatitis  ED - s/p IPP 19: FUV with Dr. Gant. Pt with bothersome urge incontinence. Stream ok at times. PVRs have been low. UA's negative except for glucose. Trial of mybetriq at prior visit, also tried gemteza. Currently on oxybutynin. Cysto (1.14.22) - no strictures, non obstructive prostate, normal bladder. Has fallen and broke his femur since last visit.  Very poor mobility.      24: s/p PNE placement for trial with Dr. Gant          Past Medical History:   Diagnosis Date    Age-related nuclear cataract, left eye 2020    Age-related nuclear cataract of left eye    Central retinal vein occlusion, unspecified eye, stable (CMS-HCC) 2016    CRVO (central retinal vein occlusion)    Cyst of pancreas ([facility]) 2020    Pancreatic cyst    Disorder of lipoprotein metabolism, unspecified     Disorder of lipoid metabolism    Edema, unspecified 2017    Dependent edema    Edema, unspecified 2017    Dependent edema    Enterocolitis due to Clostridium difficile, not specified as recurrent 2022    Clostridium difficile diarrhea    Essential (primary) hypertension 2016    Benign essential HTN    Essential (primary) hypertension 2016    Benign essential HTN    Headache, unspecified 2016    Acute headache    Meibomian gland dysfunction right eye, upper and lower eyelids 06/15/2016    Meibomian gland dysfunction (MGD), bilateral, both upper and lower lids    Myopia, bilateral 10/03/2022    Myopia of both eyes with astigmatism and presbyopia    Ocular pain, right eye 2019    Ocular pain, right eye     Other conditions influencing health status     Accident    Other conditions influencing health status     Herniated Disc (L4 - L5) Central    Other conditions influencing health status     Herniated Disc (C5 - C6)    Other intervertebral disc degeneration, lumbosacral region     Disc degeneration, lumbosacral    Other specified abnormal findings of blood chemistry 04/29/2021    Creatinine elevation    Other specified postprocedural states 05/01/2019    History of vitrectomy    Pain in unspecified limb     Limb pain    Pain in unspecified limb     Limb pain    Personal history of other diseases of the circulatory system     History of hypertension    Personal history of other diseases of the circulatory system     History of hypertension    Personal history of other diseases of the musculoskeletal system and connective tissue     History of backache    Personal history of other diseases of the nervous system and sense organs     History of central retinal vein occlusion    Personal history of other endocrine, nutritional and metabolic disease     History of obesity    Personal history of other specified conditions 10/30/2019    History of emergence delirium    Personal history of other specified conditions 06/15/2022    History of diarrhea    Presence of intraocular lens 10/03/2022    Pseudophakia of right eye    Type 2 diabetes mellitus with unspecified diabetic retinopathy without macular edema (Multi) 02/26/2015    Diabetic retinopathy of both eyes    Type 2 diabetes mellitus without complications (Multi) 03/16/2016    Diabetes mellitus    Unspecified asthma, uncomplicated ([facility]) 04/03/2020    Asthmatic bronchitis    Unspecified blepharitis right eye, unspecified eyelid 06/15/2016    Blepharitis of both eyes    Unspecified cataract     Cataract of both eyes    Unspecified keratitis 01/13/2020    Right keratitis       Past Surgical History:   Procedure Laterality Date    CATARACT EXTRACTION  11/13/2012     Cataract Surgery    CERVICAL LAMINECTOMY  09/24/2018    Laminectomy Cervical    GASTRIC BYPASS  11/13/2012    Gastric Surgery For Morbid Obesity Gastric Bypass    HERNIA REPAIR  11/13/2012    Inguinal Hernia Repair    HERNIA REPAIR  07/15/2013    Hernia Repair    LUMBAR FUSION  07/15/2013    Lumbar Vertebral Fusion    LUMBAR FUSION  07/16/2013    Lumbar Vertebral Fusion    LUMBAR LAMINECTOMY  07/15/2013    Laminectomy Lumbar    LUMBAR LAMINECTOMY  06/06/2013    Laminectomy Lumbar    MR HEAD ANGIO WO IV CONTRAST  4/5/2021    MR HEAD ANGIO WO IV CONTRAST 4/5/2021 [facility]    OTHER SURGICAL HISTORY  10/30/2019    Inguinal hernia repair    OTHER SURGICAL HISTORY  08/15/2019    Knee replacement    OTHER SURGICAL HISTORY  05/13/2019    Colonoscopy    OTHER SURGICAL HISTORY  12/23/2019    Penile surgery    SEPTOPLASTY  11/13/2012    Septoplasty       Social History     Socioeconomic History    Marital status: Single     Spouse name: Not on file    Number of children: Not on file    Years of education: Not on file    Highest education level: Not on file   Occupational History    Not on file   Tobacco Use    Smoking status: Former     Types: Cigarettes    Smokeless tobacco: Never   Substance and Sexual Activity    Alcohol use: Yes     Alcohol/week: 6.0 standard drinks of alcohol     Types: 6 Glasses of wine per week     Comment: glass with dinner    Drug use: Never    Sexual activity: Not on file   Other Topics Concern    Not on file   Social History Narrative    Not on file     Social Determinants of Health     Financial Resource Strain: Low Risk  (6/26/2023)    Received from [facility]    Overall Financial Resource Strain (CARDIA)     Difficulty of Paying Living Expenses: Not very hard   Food Insecurity: No Food Insecurity (6/26/2023)    Received from [facility]    Hunger Vital Sign     Worried About Running Out of Food in the Last Year: Never true     Ran Out of  "Food in the Last Year: Never true   Transportation Needs: No Transportation Needs (6/26/2023)    Received from [facility]    PRAPARE - Transportation     Lack of Transportation (Medical): No     Lack of Transportation (Non-Medical): No   Physical Activity: Not on file   Stress: Not on file   Social Connections: Not on file   Intimate Partner Violence: Not on file   Housing Stability: Unknown (6/26/2023)    [facility]    Housing Stability Vital Sign     Unable to Pay for Housing in the Last Year: No     Number of Places Lived in the Last Year: Not on file     Unstable Housing in the Last Year: No       Allergies   Allergen Reactions    Ciprofloxacin Itching    Ketorolac Unknown    Latex Unknown    Penicillins Unknown          Current Outpatient Medications:     acetaminophen (Tylenol) 500 mg tablet, Take by mouth every 6 hours., Disp: , Rfl:     acyclovir (Zovirax) 800 mg tablet, TAKE 1 TABLET EVERY DAY, Disp: 90 tablet, Rfl: 1    albuterol 0.63 mg/3 mL nebulizer solution, Take 3 mL (0.63 mg) by nebulization every 6 hours if needed for wheezing., Disp: 25 mL, Rfl: 1    cholecalciferol (Vitamin D-3) 50 MCG (2000 UT) tablet, Take 2 tablets (4,000 Units) by mouth 2 times a day., Disp: , Rfl:     cloNIDine (Catapres) 0.3 mg tablet, TAKE 1 TABLET TWICE DAILY, Disp: 30 tablet, Rfl: 0    cyanocobalamin (Vitamin B-12) 500 mcg tablet, Take 2 tablets (1,000 mcg) by mouth once daily., Disp: , Rfl:     dapagliflozin propanediol (Farxiga) 10 mg, Take 1 tablet (10 mg) by mouth once daily., Disp: 100 tablet, Rfl: 0    hydrOXYzine HCL (Atarax) 25 mg tablet, Take 1 tablet (25 mg) by mouth 3 times a day., Disp: 90 tablet, Rfl: 2    insulin syr/ndl U100 half mario 0.5 mL 31 gauge x 5/16\" syringe, With meals, Disp: , Rfl:     latanoprost (Xalatan) 0.005 % ophthalmic solution, Administer 1 drop into the left eye once daily at bedtime., Disp: 7.5 mL, Rfl: 3    lidocaine " (Lidoderm) 5 % patch, Place 1 patch over 12 hours on the skin once daily. Apply to painful area 12 hours per day, remove for 12 hours., Disp: 30 patch, Rfl: 0    lisinopril 40 mg tablet, TAKE 1 TABLET EVERY DAY, Disp: 90 tablet, Rfl: 0    melatonin 3 mg tablet, Take 1 tablet (3 mg) by mouth once daily at bedtime., Disp: , Rfl:     montelukast (Singulair) 10 mg tablet, TAKE 1 TABLET EVERY DAY, Disp: 90 tablet, Rfl: 1    OneTouch Ultra Test strip, 3 times a day., Disp: , Rfl:     oxybutynin XL (Ditropan-XL) 10 mg 24 hr tablet, TAKE 1 TABLET EVERY DAY, Disp: 90 tablet, Rfl: 1    pantoprazole (ProtoNix) 20 mg EC tablet, TAKE 1 TABLET EVERY DAY, Disp: 90 tablet, Rfl: 3    rosuvastatin (Crestor) 10 mg tablet, TAKE 1 TABLET ONE TIME DAILY, Disp: 90 tablet, Rfl: 1    tirzepatide (Mounjaro) 7.5 mg/0.5 mL pen injector, Inject 7.5 mg under the skin 1 (one) time per week., Disp: 6 mL, Rfl: 0    tiZANidine (Zanaflex) 4 mg tablet, TAKE 1 TABLET THREE TIMES DAILY, Disp: 270 tablet, Rfl: 1    traMADol (Ultram) 50 mg tablet, Take 1 tablet (50 mg) by mouth every 8 hours if needed for severe pain (7 - 10) for up to 6 days., Disp: 18 tablet, Rfl: 0    walker (Ultra-Light Rollator) misc, Use for ambulation, Disp: 1 each, Rfl: 0     Review of system:  All other systems have been reviewed and are negative for complaints      Last recorded vitals:  There were no vitals taken for this visit.    Physical Exam:  ***      Labs  Office Visit on 09/04/2024   Component Date Value    Albumin, Urine Random 09/04/2024 105.8     Creatinine, Urine Random 09/04/2024 121.1     Albumin/Creatinine Ratio 09/04/2024 87.4 (H)     POC Fingerstick Blood Gl* 09/04/2024 213 (A)     Urine Culture 09/04/2024 No significant growth        Assessment and Plan:  Elliot Larson is a 74 y.o. male with history of BPH- s/p Urolift, UTIs, Prostatitis, ED - s/p IPP 12/20/19, with bothersome urge incontinence s/p PNE placement for trial with Dr. Gant on 9/6/24 who  presents for PNE removal.    Plan:      Follow-up ***, or sooner if needed, to reassess symptoms and for medication refill.    All questions and concerns were addressed. Patient verbalizes understanding and has no other questions at this time.     Some elements copied from Dr. Gant's note on 8/27/24, the elements have been updated and all reflect current decision making from today, 09/08/24     ORALIA Marie-CNP   [facility]  09/08/24 10:39 PM

## 2024-09-10 ENCOUNTER — APPOINTMENT (OUTPATIENT)
Dept: UROLOGY | Facility: HOSPITAL | Age: 74
End: 2024-09-10
Payer: MEDICARE

## 2024-09-11 ENCOUNTER — APPOINTMENT (OUTPATIENT)
Dept: PRIMARY CARE | Facility: CLINIC | Age: 74
End: 2024-09-11
Payer: MEDICARE

## 2024-09-11 ENCOUNTER — OFFICE VISIT (OUTPATIENT)
Dept: UROLOGY | Facility: HOSPITAL | Age: 74
End: 2024-09-11
Payer: MEDICARE

## 2024-09-11 DIAGNOSIS — R35.0 BENIGN PROSTATIC HYPERPLASIA WITH URINARY FREQUENCY: ICD-10-CM

## 2024-09-11 DIAGNOSIS — N40.1 BENIGN PROSTATIC HYPERPLASIA WITH URINARY FREQUENCY: ICD-10-CM

## 2024-09-11 DIAGNOSIS — N39.41 URGE INCONTINENCE OF URINE: Primary | ICD-10-CM

## 2024-09-11 DIAGNOSIS — N32.81 OVERACTIVE BLADDER: ICD-10-CM

## 2024-09-11 PROCEDURE — 4010F ACE/ARB THERAPY RXD/TAKEN: CPT | Performed by: NURSE PRACTITIONER

## 2024-09-11 PROCEDURE — G2211 COMPLEX E/M VISIT ADD ON: HCPCS | Performed by: NURSE PRACTITIONER

## 2024-09-11 PROCEDURE — 99214 OFFICE O/P EST MOD 30 MIN: CPT | Performed by: NURSE PRACTITIONER

## 2024-09-11 PROCEDURE — 1036F TOBACCO NON-USER: CPT | Performed by: NURSE PRACTITIONER

## 2024-09-11 PROCEDURE — 1126F AMNT PAIN NOTED NONE PRSNT: CPT | Performed by: NURSE PRACTITIONER

## 2024-09-11 PROCEDURE — 1160F RVW MEDS BY RX/DR IN RCRD: CPT | Performed by: NURSE PRACTITIONER

## 2024-09-11 PROCEDURE — 3060F POS MICROALBUMINURIA REV: CPT | Performed by: NURSE PRACTITIONER

## 2024-09-11 PROCEDURE — 1159F MED LIST DOCD IN RCRD: CPT | Performed by: NURSE PRACTITIONER

## 2024-09-11 ASSESSMENT — PAIN SCALES - GENERAL: PAINLEVEL: 0-NO PAIN

## 2024-09-11 NOTE — PROGRESS NOTES
[facility]  Outpatient Clinic Note    Patient Name:  Elliot Larson  MRN:  [id number]  :  1950  Date of Service: 2024     Visit type: Follow up visit    HPI    Interval History:  Elliot Larson is a 74 y.o. male who is being seen today for  problems listed below.     Problem list/Chief complaints:  BPH- s/p Urolift  UTIs  Prostatitis  ED - s/p IPP 19: FUV with Dr. Gant. Pt with bothersome urge incontinence. Stream ok at times. PVRs have been low. UA's negative except for glucose. Trial of mybetriq at prior visit, also tried gemteza. Currently on oxybutynin. Cysto (1.14.22) - no strictures, non obstructive prostate, normal bladder. Has fallen and broke his femur since last visit.  Very poor mobility.      24: s/p PNE placement for trial with Dr. Gant    24: Patient presents for PNE lead removal. He has not noticed any difference in his urinary symptoms with PNE. He is also taking Oxybutynin as prescribed. He did not consistently fill out his bladder diary.      Past Medical History:   Diagnosis Date    Age-related nuclear cataract, left eye 2020    Age-related nuclear cataract of left eye    Central retinal vein occlusion, unspecified eye, stable (CMS-HCC) 2016    CRVO (central retinal vein occlusion)    Cyst of pancreas ([facility]) 2020    Pancreatic cyst    Disorder of lipoprotein metabolism, unspecified     Disorder of lipoid metabolism    Edema, unspecified 2017    Dependent edema    Edema, unspecified 2017    Dependent edema    Enterocolitis due to Clostridium difficile, not specified as recurrent 2022    Clostridium difficile diarrhea    Essential (primary) hypertension 2016    Benign essential HTN    Essential (primary) hypertension 2016    Benign essential HTN    Headache, unspecified 2016    Acute headache    Meibomian gland dysfunction right eye, upper and lower eyelids 06/15/2016    Meibomian gland  dysfunction (MGD), bilateral, both upper and lower lids    Myopia, bilateral 10/03/2022    Myopia of both eyes with astigmatism and presbyopia    Ocular pain, right eye 07/02/2019    Ocular pain, right eye    Other conditions influencing health status     Accident    Other conditions influencing health status     Herniated Disc (L4 - L5) Central    Other conditions influencing health status     Herniated Disc (C5 - C6)    Other intervertebral disc degeneration, lumbosacral region     Disc degeneration, lumbosacral    Other specified abnormal findings of blood chemistry 04/29/2021    Creatinine elevation    Other specified postprocedural states 05/01/2019    History of vitrectomy    Pain in unspecified limb     Limb pain    Pain in unspecified limb     Limb pain    Personal history of other diseases of the circulatory system     History of hypertension    Personal history of other diseases of the circulatory system     History of hypertension    Personal history of other diseases of the musculoskeletal system and connective tissue     History of backache    Personal history of other diseases of the nervous system and sense organs     History of central retinal vein occlusion    Personal history of other endocrine, nutritional and metabolic disease     History of obesity    Personal history of other specified conditions 10/30/2019    History of emergence delirium    Personal history of other specified conditions 06/15/2022    History of diarrhea    Presence of intraocular lens 10/03/2022    Pseudophakia of right eye    Type 2 diabetes mellitus with unspecified diabetic retinopathy without macular edema (Multi) 02/26/2015    Diabetic retinopathy of both eyes    Type 2 diabetes mellitus without complications (Multi) 03/16/2016    Diabetes mellitus    Unspecified asthma, uncomplicated ([facility]) 04/03/2020    Asthmatic bronchitis    Unspecified blepharitis right eye, unspecified eyelid 06/15/2016    Blepharitis of both  eyes    Unspecified cataract     Cataract of both eyes    Unspecified keratitis 01/13/2020    Right keratitis       Past Surgical History:   Procedure Laterality Date    CATARACT EXTRACTION  11/13/2012    Cataract Surgery    CERVICAL LAMINECTOMY  09/24/2018    Laminectomy Cervical    GASTRIC BYPASS  11/13/2012    Gastric Surgery For Morbid Obesity Gastric Bypass    HERNIA REPAIR  11/13/2012    Inguinal Hernia Repair    HERNIA REPAIR  07/15/2013    Hernia Repair    LUMBAR FUSION  07/15/2013    Lumbar Vertebral Fusion    LUMBAR FUSION  07/16/2013    Lumbar Vertebral Fusion    LUMBAR LAMINECTOMY  07/15/2013    Laminectomy Lumbar    LUMBAR LAMINECTOMY  06/06/2013    Laminectomy Lumbar    MR HEAD ANGIO WO IV CONTRAST  4/5/2021    MR HEAD ANGIO WO IV CONTRAST 4/5/2021 [facility]    OTHER SURGICAL HISTORY  10/30/2019    Inguinal hernia repair    OTHER SURGICAL HISTORY  08/15/2019    Knee replacement    OTHER SURGICAL HISTORY  05/13/2019    Colonoscopy    OTHER SURGICAL HISTORY  12/23/2019    Penile surgery    SEPTOPLASTY  11/13/2012    Septoplasty       Social History     Socioeconomic History    Marital status: Single     Spouse name: Not on file    Number of children: Not on file    Years of education: Not on file    Highest education level: Not on file   Occupational History    Not on file   Tobacco Use    Smoking status: Former     Types: Cigarettes    Smokeless tobacco: Never   Substance and Sexual Activity    Alcohol use: Yes     Alcohol/week: 6.0 standard drinks of alcohol     Types: 6 Glasses of wine per week     Comment: glass with dinner    Drug use: Never    Sexual activity: Not on file   Other Topics Concern    Not on file   Social History Narrative    Not on file     Social Determinants of Health     Financial Resource Strain: Low Risk  (6/26/2023)    [facility]    Overall Financial Resource Strain (CARDIA)     Difficulty of Paying Living Expenses: Not very hard    Food Insecurity: No Food Insecurity (6/26/2023)    Received from [facility]    Hunger Vital Sign     Worried About Running Out of Food in the Last Year: Never true     Ran Out of Food in the Last Year: Never true   Transportation Needs: No Transportation Needs (6/26/2023)    Received from [facility]    PRAPARE - Transportation     Lack of Transportation (Medical): No     Lack of Transportation (Non-Medical): No   Physical Activity: Not on file   Stress: Not on file   Social Connections: Not on file   Intimate Partner Violence: Not on file   Housing Stability: Unknown (6/26/2023)    Received from [facility]    Housing Stability Vital Sign     Unable to Pay for Housing in the Last Year: No     Number of Places Lived in the Last Year: Not on file     Unstable Housing in the Last Year: No       Allergies   Allergen Reactions    Ciprofloxacin Itching    Ketorolac Unknown    Latex Unknown    Penicillins Unknown          Current Outpatient Medications:     acetaminophen (Tylenol) 500 mg tablet, Take by mouth every 6 hours., Disp: , Rfl:     acyclovir (Zovirax) 800 mg tablet, TAKE 1 TABLET EVERY DAY, Disp: 90 tablet, Rfl: 1    albuterol 0.63 mg/3 mL nebulizer solution, Take 3 mL (0.63 mg) by nebulization every 6 hours if needed for wheezing., Disp: 25 mL, Rfl: 1    cholecalciferol (Vitamin D-3) 50 MCG (2000 UT) tablet, Take 2 tablets (4,000 Units) by mouth 2 times a day., Disp: , Rfl:     cloNIDine (Catapres) 0.3 mg tablet, TAKE 1 TABLET TWICE DAILY, Disp: 30 tablet, Rfl: 0    cyanocobalamin (Vitamin B-12) 500 mcg tablet, Take 2 tablets (1,000 mcg) by mouth once daily., Disp: , Rfl:     dapagliflozin propanediol (Farxiga) 10 mg, Take 1 tablet (10 mg) by mouth once daily., Disp: 100 tablet, Rfl: 0    hydrOXYzine HCL (Atarax) 25 mg tablet, Take 1 tablet (25 mg) by mouth 3 times a day., Disp: 90 tablet, Rfl: 2    insulin syr/ndl U100 half mario 0.5 mL 31  "gauge x 5/16\" syringe, With meals, Disp: , Rfl:     latanoprost (Xalatan) 0.005 % ophthalmic solution, Administer 1 drop into the left eye once daily at bedtime., Disp: 7.5 mL, Rfl: 3    lidocaine (Lidoderm) 5 % patch, Place 1 patch over 12 hours on the skin once daily. Apply to painful area 12 hours per day, remove for 12 hours., Disp: 30 patch, Rfl: 0    lisinopril 40 mg tablet, TAKE 1 TABLET EVERY DAY, Disp: 90 tablet, Rfl: 0    melatonin 3 mg tablet, Take 1 tablet (3 mg) by mouth once daily at bedtime., Disp: , Rfl:     montelukast (Singulair) 10 mg tablet, TAKE 1 TABLET EVERY DAY, Disp: 90 tablet, Rfl: 1    OneTouch Ultra Test strip, 3 times a day., Disp: , Rfl:     oxybutynin XL (Ditropan-XL) 10 mg 24 hr tablet, TAKE 1 TABLET EVERY DAY, Disp: 90 tablet, Rfl: 1    pantoprazole (ProtoNix) 20 mg EC tablet, TAKE 1 TABLET EVERY DAY, Disp: 90 tablet, Rfl: 3    rosuvastatin (Crestor) 10 mg tablet, TAKE 1 TABLET ONE TIME DAILY, Disp: 90 tablet, Rfl: 1    tirzepatide (Mounjaro) 7.5 mg/0.5 mL pen injector, Inject 7.5 mg under the skin 1 (one) time per week., Disp: 6 mL, Rfl: 0    tiZANidine (Zanaflex) 4 mg tablet, TAKE 1 TABLET THREE TIMES DAILY, Disp: 270 tablet, Rfl: 1    walker (Ultra-Light Rollator) misc, Use for ambulation, Disp: 1 each, Rfl: 0     Review of system:  All other systems have been reviewed and are negative for complaints      Last recorded vitals:  There were no vitals taken for this visit.    Physical Exam:  General: Appears comfortable and in no apparent distress.  Head: Normocephalic, atraumatic  Eyes: Non-injected conjunctiva, sclera clear, no proptosis  Lungs: Breathing is easy, non-labored. Speaking in clear and complete sentences. Normal diaphragmatic movement.  Cardiovascular: no peripheral edema, cyanosis or pallor.   Abdomen: soft, non-distended, non-tender  : Bladder: non tender, not distended  MSK: Ambulatory with walker  Skin: No visible rashes or lesions  Neurologic: Alert, oriented to " person, place, and time  Psychiatric: mood and affect appropriate        Labs  Office Visit on 09/04/2024   Component Date Value    Albumin, Urine Random 09/04/2024 105.8     Creatinine, Urine Random 09/04/2024 121.1     Albumin/Creatinine Ratio 09/04/2024 87.4 (H)     POC Fingerstick Blood Gl* 09/04/2024 213 (A)     Urine Culture 09/04/2024 No significant growth        Assessment and Plan:  Elliot Larson is a 74 y.o. male with history of BPH- s/p Urolift, UTIs, Prostatitis, ED - s/p IPP 12/20/19, with bothersome urge incontinence s/p PNE placement for trial with Dr. Gant on 9/6/24 who presents for PNE removal.    -PNE leads removed and patient tolerated well. No bleeding or drainage noted. Band-Aid applied to each lead site. Voiding diary reviewed.    Plan:  -Discussed the risks and benefit of continuing Oxybutynin 10 mg daily, medication is working well for patient with no side effects. Discussed other management options. The patient and I agreed to continue with medication.  -Follow-up as scheduled, or sooner if needed, to reassess symptoms and for medication refill.    All questions and concerns were addressed. Patient verbalizes understanding and has no other questions at this time.     Some elements copied from Dr. Gant's note on 8/27/24, the elements have been updated and all reflect current decision making from today, 09/11/24    E&M visit today is associated with current or anticipated ongoing medical care services related to a patient's single, serious condition or a complex condition.    Kavita Green, ORALIA-CNP   [facility]  09/11/24

## 2024-09-16 ENCOUNTER — HOSPITAL ENCOUNTER (OUTPATIENT)
Facility: HOSPITAL | Age: 74
Setting detail: OUTPATIENT SURGERY
End: 2024-09-16
Attending: UROLOGY | Admitting: UROLOGY
Payer: MEDICARE

## 2024-09-16 ENCOUNTER — PREP FOR PROCEDURE (OUTPATIENT)
Dept: UROLOGY | Facility: HOSPITAL | Age: 74
End: 2024-09-16
Payer: MEDICARE

## 2024-09-16 DIAGNOSIS — N39.41 URGE INCONTINENCE: ICD-10-CM

## 2024-09-16 DIAGNOSIS — N32.81 OAB (OVERACTIVE BLADDER): Primary | ICD-10-CM

## 2024-09-16 DIAGNOSIS — R82.90 UNSPECIFIED ABNORMAL FINDINGS IN URINE: ICD-10-CM

## 2024-09-16 DIAGNOSIS — R79.1 ABNORMAL COAGULATION PROFILE: ICD-10-CM

## 2024-09-16 RX ORDER — GABAPENTIN 300 MG/1
600 CAPSULE ORAL ONCE
OUTPATIENT
Start: 2024-09-16 | End: 2024-09-16

## 2024-09-16 RX ORDER — CEFAZOLIN SODIUM 2 G/100ML
2 INJECTION, SOLUTION INTRAVENOUS ONCE
OUTPATIENT
Start: 2024-09-16 | End: 2024-09-16

## 2024-09-16 RX ORDER — SODIUM CHLORIDE, SODIUM LACTATE, POTASSIUM CHLORIDE, CALCIUM CHLORIDE 600; 310; 30; 20 MG/100ML; MG/100ML; MG/100ML; MG/100ML
20 INJECTION, SOLUTION INTRAVENOUS CONTINUOUS
OUTPATIENT
Start: 2024-09-16

## 2024-09-16 RX ORDER — CHLORHEXIDINE GLUCONATE 40 MG/ML
SOLUTION TOPICAL DAILY PRN
OUTPATIENT
Start: 2024-09-16

## 2024-09-16 RX ORDER — ACETAMINOPHEN 325 MG/1
650 TABLET ORAL ONCE
OUTPATIENT
Start: 2024-09-16 | End: 2024-09-16

## 2024-09-18 ENCOUNTER — CLINICAL SUPPORT (OUTPATIENT)
Dept: PREADMISSION TESTING | Facility: HOSPITAL | Age: 74
End: 2024-09-18
Payer: MEDICARE

## 2024-09-18 DIAGNOSIS — N32.81 OAB (OVERACTIVE BLADDER): ICD-10-CM

## 2024-09-18 RX ORDER — CALCIUM CARBONATE 300MG(750)
400 TABLET,CHEWABLE ORAL DAILY
COMMUNITY

## 2024-09-19 ENCOUNTER — APPOINTMENT (OUTPATIENT)
Dept: UROLOGY | Facility: HOSPITAL | Age: 74
End: 2024-09-19
Payer: MEDICARE

## 2024-09-19 ENCOUNTER — LAB (OUTPATIENT)
Dept: LAB | Facility: LAB | Age: 74
End: 2024-09-19
Payer: MEDICARE

## 2024-09-19 DIAGNOSIS — N32.81 OAB (OVERACTIVE BLADDER): ICD-10-CM

## 2024-09-19 DIAGNOSIS — E11.9 DIABETES MELLITUS WITHOUT OPHTHALMIC MANIFESTATIONS (MULTI): ICD-10-CM

## 2024-09-19 DIAGNOSIS — E78.5 DYSLIPIDEMIA: ICD-10-CM

## 2024-09-19 DIAGNOSIS — Z12.5 SCREENING PSA (PROSTATE SPECIFIC ANTIGEN): ICD-10-CM

## 2024-09-19 DIAGNOSIS — R79.1 ABNORMAL COAGULATION PROFILE: ICD-10-CM

## 2024-09-19 LAB
ALBUMIN SERPL BCP-MCNC: 3.8 G/DL (ref 3.4–5)
ALP SERPL-CCNC: 105 U/L (ref 33–136)
ALT SERPL W P-5'-P-CCNC: 12 U/L (ref 10–52)
ANION GAP SERPL CALC-SCNC: 11 MMOL/L (ref 10–20)
APTT PPP: 30 SECONDS (ref 27–38)
AST SERPL W P-5'-P-CCNC: 13 U/L (ref 9–39)
BILIRUB SERPL-MCNC: 1 MG/DL (ref 0–1.2)
BUN SERPL-MCNC: 22 MG/DL (ref 6–23)
CALCIUM SERPL-MCNC: 9.1 MG/DL (ref 8.6–10.6)
CHLORIDE SERPL-SCNC: 104 MMOL/L (ref 98–107)
CHOLEST SERPL-MCNC: 131 MG/DL (ref 0–199)
CHOLESTEROL/HDL RATIO: 2.3
CO2 SERPL-SCNC: 28 MMOL/L (ref 21–32)
CREAT SERPL-MCNC: 0.98 MG/DL (ref 0.5–1.3)
EGFRCR SERPLBLD CKD-EPI 2021: 81 ML/MIN/1.73M*2
ERYTHROCYTE [DISTWIDTH] IN BLOOD BY AUTOMATED COUNT: 12.2 % (ref 11.5–14.5)
GLUCOSE SERPL-MCNC: 261 MG/DL (ref 74–99)
HCT VFR BLD AUTO: 39.3 % (ref 41–52)
HDLC SERPL-MCNC: 57.1 MG/DL
HGB BLD-MCNC: 13.4 G/DL (ref 13.5–17.5)
INR PPP: 1.1 (ref 0.9–1.1)
LDLC SERPL CALC-MCNC: 47 MG/DL
MCH RBC QN AUTO: 33.3 PG (ref 26–34)
MCHC RBC AUTO-ENTMCNC: 34.1 G/DL (ref 32–36)
MCV RBC AUTO: 98 FL (ref 80–100)
NON HDL CHOLESTEROL: 74 MG/DL (ref 0–149)
NRBC BLD-RTO: 0 /100 WBCS (ref 0–0)
PLATELET # BLD AUTO: 171 X10*3/UL (ref 150–450)
POTASSIUM SERPL-SCNC: 3.9 MMOL/L (ref 3.5–5.3)
PROT SERPL-MCNC: 6.5 G/DL (ref 6.4–8.2)
PROTHROMBIN TIME: 12.1 SECONDS (ref 9.8–12.8)
PSA SERPL-MCNC: 1.49 NG/ML
RBC # BLD AUTO: 4.02 X10*6/UL (ref 4.5–5.9)
SODIUM SERPL-SCNC: 139 MMOL/L (ref 136–145)
TRIGL SERPL-MCNC: 133 MG/DL (ref 0–149)
VLDL: 27 MG/DL (ref 0–40)
WBC # BLD AUTO: 3.8 X10*3/UL (ref 4.4–11.3)

## 2024-09-19 PROCEDURE — 85730 THROMBOPLASTIN TIME PARTIAL: CPT

## 2024-09-19 PROCEDURE — 80053 COMPREHEN METABOLIC PANEL: CPT

## 2024-09-19 PROCEDURE — 36415 COLL VENOUS BLD VENIPUNCTURE: CPT

## 2024-09-19 PROCEDURE — 80061 LIPID PANEL: CPT

## 2024-09-19 PROCEDURE — G0103 PSA SCREENING: HCPCS

## 2024-09-19 PROCEDURE — 85610 PROTHROMBIN TIME: CPT

## 2024-09-19 PROCEDURE — 85027 COMPLETE CBC AUTOMATED: CPT

## 2024-09-19 PROCEDURE — 83036 HEMOGLOBIN GLYCOSYLATED A1C: CPT

## 2024-09-20 ENCOUNTER — PRE-ADMISSION TESTING (OUTPATIENT)
Dept: PREADMISSION TESTING | Facility: HOSPITAL | Age: 74
End: 2024-09-20
Payer: MEDICARE

## 2024-09-20 LAB
EST. AVERAGE GLUCOSE BLD GHB EST-MCNC: 171 MG/DL
HBA1C MFR BLD: 7.6 %

## 2024-09-23 DIAGNOSIS — J45.909 ASTHMA, UNSPECIFIED ASTHMA SEVERITY, UNSPECIFIED WHETHER COMPLICATED, UNSPECIFIED WHETHER PERSISTENT (HHS-HCC): ICD-10-CM

## 2024-09-23 DIAGNOSIS — M51.36 DISC DEGENERATION, LUMBAR: ICD-10-CM

## 2024-09-23 DIAGNOSIS — Z00.00 HEALTHCARE MAINTENANCE: ICD-10-CM

## 2024-09-24 ENCOUNTER — LAB (OUTPATIENT)
Dept: LAB | Facility: LAB | Age: 74
End: 2024-09-24
Payer: MEDICARE

## 2024-09-24 ENCOUNTER — TELEPHONE (OUTPATIENT)
Dept: PRIMARY CARE | Facility: CLINIC | Age: 74
End: 2024-09-24

## 2024-09-24 ENCOUNTER — PRE-ADMISSION TESTING (OUTPATIENT)
Dept: PREADMISSION TESTING | Facility: HOSPITAL | Age: 74
End: 2024-09-24
Payer: MEDICARE

## 2024-09-24 VITALS
TEMPERATURE: 98.2 F | BODY MASS INDEX: 33.08 KG/M2 | WEIGHT: 218.26 LBS | SYSTOLIC BLOOD PRESSURE: 146 MMHG | DIASTOLIC BLOOD PRESSURE: 75 MMHG | HEART RATE: 77 BPM | RESPIRATION RATE: 18 BRPM | HEIGHT: 68 IN | OXYGEN SATURATION: 96 %

## 2024-09-24 DIAGNOSIS — Z01.818 PREOP TESTING: Primary | ICD-10-CM

## 2024-09-24 DIAGNOSIS — Z20.822 EXPOSURE TO COVID-19 VIRUS: ICD-10-CM

## 2024-09-24 DIAGNOSIS — Z01.818 PREOP TESTING: ICD-10-CM

## 2024-09-24 LAB — SARS-COV-2 RNA RESP QL NAA+PROBE: NOT DETECTED

## 2024-09-24 PROCEDURE — 87635 SARS-COV-2 COVID-19 AMP PRB: CPT

## 2024-09-24 PROCEDURE — 87081 CULTURE SCREEN ONLY: CPT | Mod: AHULAB | Performed by: PHYSICIAN ASSISTANT

## 2024-09-24 PROCEDURE — 93010 ELECTROCARDIOGRAM REPORT: CPT | Performed by: INTERNAL MEDICINE

## 2024-09-24 PROCEDURE — 99204 OFFICE O/P NEW MOD 45 MIN: CPT | Performed by: PHYSICIAN ASSISTANT

## 2024-09-24 PROCEDURE — 93005 ELECTROCARDIOGRAM TRACING: CPT | Performed by: PHYSICIAN ASSISTANT

## 2024-09-24 RX ORDER — MONTELUKAST SODIUM 10 MG/1
10 TABLET ORAL DAILY
Qty: 90 TABLET | Refills: 0 | Status: SHIPPED | OUTPATIENT
Start: 2024-09-24

## 2024-09-24 RX ORDER — ACYCLOVIR 800 MG/1
800 TABLET ORAL DAILY
Qty: 90 TABLET | Refills: 0 | Status: SHIPPED | OUTPATIENT
Start: 2024-09-24

## 2024-09-24 RX ORDER — TIZANIDINE 4 MG/1
4 TABLET ORAL 3 TIMES DAILY
Qty: 270 TABLET | Refills: 0 | Status: SHIPPED | OUTPATIENT
Start: 2024-09-24

## 2024-09-24 RX ORDER — CHLORHEXIDINE GLUCONATE ORAL RINSE 1.2 MG/ML
SOLUTION DENTAL
Qty: 473 ML | Refills: 0 | Status: SHIPPED | OUTPATIENT
Start: 2024-09-24

## 2024-09-24 ASSESSMENT — ENCOUNTER SYMPTOMS
EYES NEGATIVE: 1
CONSTITUTIONAL NEGATIVE: 1
BACK PAIN: 1
HEMATOLOGIC/LYMPHATIC NEGATIVE: 1
GASTROINTESTINAL NEGATIVE: 1
RESPIRATORY NEGATIVE: 1
ENDOCRINE NEGATIVE: 1
ALLERGIC/IMMUNOLOGIC NEGATIVE: 1
CARDIOVASCULAR NEGATIVE: 1
PSYCHIATRIC NEGATIVE: 1
NEUROLOGICAL NEGATIVE: 1

## 2024-09-24 NOTE — CPM/PAT H&P
"[facility]/PAT Evaluation       Name: Elliot Larson (Elliot Larson)  /Age: [birth year]//74 y.o.     In-Person       Chief Complaint: OAB (overactive bladder) /  Urge incontinence     HPI    Date of Consult: 24    Referring Provider: Dr. Gant     Surgery, Date, and Length: Stage 1 Insertion Continence Control Stimulator Sacral Lead; 24; 60 minutes     Elliot Larson  is a 74 year-old male who presents to the [facility] for perioperative risk assessment prior to surgery. Pt with bothersome urge incontinence. Also frequency of small amounts. He is s/p previous urolift. Denies recurrent infections.    This note was created in part upon personal review of patient's medical records.      Patient is scheduled to have Stage 1 Insertion Continence Control Stimulator Sacral Lead     Medical History  Past Medical History:   Diagnosis Date    Adverse effect of anesthesia     After hip surgery \"took 3 days to come off breathing machine\"    Anemia     Follows with heme/onc Manny Chandra, APRN-CNP LOV 24    Asthma ([facility])     Delayed emergence from general anesthesia     Depression     Dyslipidemia     Elevated coronary artery calcium score     CT 24  272.59    Enlarged prostate with lower urinary tract symptoms (LUTS)     GERD (gastroesophageal reflux disease)     under control    History of central retinal vein occlusion (CMS-HCC)     right eye    Hypertension     Legally blind     right eye    Lung nodule     CT 24 No suspicious pulmonary nodules identified.    OAB (overactive bladder)     Plan: Stage 1 Insertion Continence Control Stimulator Sacral Lead 24    Osteoarthritis     Postlaminectomy syndrome, lumbar     Thoracic ascending aortic aneurysm (CMS-HCC)     CT 24 3.9 cm and unchanged from 2018    Type 2 diabetes mellitus (Multi)     Urge incontinence         STOP BANG =   3  (male, >49yo, htn)    Caprini =  4   (age, surgery, BMI>25)    Surgical History  Past Surgical " History:   Procedure Laterality Date    CATARACT EXTRACTION Bilateral     CERVICAL LAMINECTOMY      COLONOSCOPY      GASTRIC BYPASS      Gastric Surgery For Morbid Obesity Gastric Bypass    HERNIA REPAIR Left     Inguinal Hernia Repair x 2    LUMBAR FUSION      Lumbar Vertebral Fusion    LUMBAR LAMINECTOMY      MR HEAD ANGIO WO IV CONTRAST  2021    MR HEAD ANGIO WO IV CONTRAST 2021 [facility]    ORIF HIP FRACTURE Left 2023    ORIF HIP PROXIMAL END, NECK, SLIDING HIP SCREW    OTHER SURGICAL HISTORY Left 2022    REVISION JOINT TOTAL KNEE FEMORAL AND ENTIRE TIBIAL COMPONENT    PENILE PROSTHESIS IMPLANT      SEPTOPLASTY      TOTAL KNEE ARTHROPLASTY Bilateral     VITRECTOMY               Family history:  Family History   Problem Relation Name Age of Onset    Other (BOWEL ISCHEMIA/INFARCTION) Mother      Atrial fibrillation Mother      Blood clot Mother      Coronary artery disease Father      Heart attack Father      No Known Problems Brother      Suicidality Maternal Grandfather          Social history:  Social History     Socioeconomic History    Marital status: Single     Spouse name: Not on file    Number of children: Not on file    Years of education: Not on file    Highest education level: Not on file   Occupational History    Not on file   Tobacco Use    Smoking status: Former     Current packs/day: 0.00     Average packs/day: 2.0 packs/day for 27.0 years (54.0 ttl pk-yrs)     Types: Cigarettes     Start date:      Quit date: 2006     Years since quittin.7    Smokeless tobacco: Never   Vaping Use    Vaping status: Never Used   Substance and Sexual Activity    Alcohol use: Yes     Alcohol/week: 21.0 standard drinks of alcohol     Types: 21 Glasses of wine per week     Comment: 3 glasses wine/day    Drug use: Yes     Types: Marijuana     Comment: smokes daily    Sexual activity: Defer   Other Topics Concern    Not on file   Social History Narrative    Not on file     Social  Determinants of Health     Financial Resource Strain: Low Risk  (6/26/2023)    Received from [facility]    Overall Financial Resource Strain (CARDIA)     Difficulty of Paying Living Expenses: Not very hard   Food Insecurity: No Food Insecurity (6/26/2023)    Received from [facility]    Hunger Vital Sign     Worried About Running Out of Food in the Last Year: Never true     Ran Out of Food in the Last Year: Never true   Transportation Needs: No Transportation Needs (6/26/2023)    Received from [facility]    PRAPARE - Transportation     Lack of Transportation (Medical): No     Lack of Transportation (Non-Medical): No   Physical Activity: Not on file   Stress: Not on file   Social Connections: Not on file   Intimate Partner Violence: Not on file   Housing Stability: Unknown (6/26/2023)    Received from [facility]    Housing Stability Vital Sign     Unable to Pay for Housing in the Last Year: No     Number of Places Lived in the Last Year: Not on file     Unstable Housing in the Last Year: No          Current Outpatient Medications:     acetaminophen (Tylenol) 500 mg tablet, Take 2 tablets (1,000 mg) by mouth every 8 hours if needed for moderate pain (4 - 6)., Disp: , Rfl:     acyclovir (Zovirax) 800 mg tablet, TAKE 1 TABLET EVERY DAY, Disp: 90 tablet, Rfl: 0    cholecalciferol (Vitamin D-3) 50 MCG (2000 UT) tablet, Take 2 tablets (4,000 Units) by mouth 2 times a day., Disp: , Rfl:     cloNIDine (Catapres) 0.3 mg tablet, TAKE 1 TABLET TWICE DAILY, Disp: 30 tablet, Rfl: 0    cyanocobalamin (Vitamin B-12) 500 mcg tablet, Take 2 tablets (1,000 mcg) by mouth once daily., Disp: , Rfl:     latanoprost (Xalatan) 0.005 % ophthalmic solution, Administer 1 drop into the left eye once daily at bedtime., Disp: 7.5 mL, Rfl: 3    lisinopril 40 mg tablet, TAKE 1 TABLET EVERY DAY, Disp: 90 tablet, Rfl: 0    magnesium oxide (Mag-Ox) 400 mg  "tablet, Take 1 tablet (400 mg) by mouth once daily., Disp: , Rfl:     montelukast (Singulair) 10 mg tablet, TAKE 1 TABLET EVERY DAY, Disp: 90 tablet, Rfl: 0    oxybutynin XL (Ditropan-XL) 10 mg 24 hr tablet, TAKE 1 TABLET EVERY DAY, Disp: 90 tablet, Rfl: 1    pantoprazole (ProtoNix) 20 mg EC tablet, TAKE 1 TABLET EVERY DAY, Disp: 90 tablet, Rfl: 3    rosuvastatin (Crestor) 10 mg tablet, TAKE 1 TABLET ONE TIME DAILY, Disp: 90 tablet, Rfl: 1    tiZANidine (Zanaflex) 4 mg tablet, TAKE 1 TABLET THREE TIMES DAILY, Disp: 270 tablet, Rfl: 0    walker (Ultra-Light Rollator) misc, Use for ambulation, Disp: 1 each, Rfl: 0    chlorhexidine (Peridex) 0.12 % solution, 15 ml swish and spit for 30 seconds night prior to surgery and morning of surgery, Disp: 473 mL, Rfl: 0    dapagliflozin propanediol (Farxiga) 10 mg, Take 1 tablet (10 mg) by mouth once daily. (Patient not taking: Reported on 9/24/2024), Disp: 100 tablet, Rfl: 0    hydrOXYzine HCL (Atarax) 25 mg tablet, Take 1 tablet (25 mg) by mouth 3 times a day., Disp: 90 tablet, Rfl: 2    insulin syr/ndl U100 half mario 0.5 mL 31 gauge x 5/16\" syringe, With meals, Disp: , Rfl:     lidocaine (Lidoderm) 5 % patch, Place 1 patch over 12 hours on the skin once daily. Apply to painful area 12 hours per day, remove for 12 hours. (Patient not taking: Reported on 9/24/2024), Disp: 30 patch, Rfl: 0    melatonin 3 mg tablet, Take 1 tablet (3 mg) by mouth once daily at bedtime., Disp: , Rfl:     OneTouch Ultra Test strip, 3 times a day., Disp: , Rfl:     tirzepatide (Mounjaro) 7.5 mg/0.5 mL pen injector, Inject 7.5 mg under the skin 1 (one) time per week. (Patient not taking: Reported on 9/18/2024), Disp: 6 mL, Rfl: 0     Visit Vitals  /75   Pulse 77   Temp 36.8 °C (98.2 °F)   Resp 18   Ht 1.715 m (5' 7.5\")   Wt 99 kg (218 lb 4.1 oz)   SpO2 96%   BMI 33.68 kg/m²   Smoking Status Former   BSA 2.17 m²           Review of Systems   Constitutional: Negative.    HENT: Negative.     Eyes: " Negative.         Glasses   Respiratory: Negative.     Cardiovascular: Negative.         METS 3/4   uses rollator with Without chest pain / SOB limitation   Gastrointestinal: Negative.    Endocrine: Negative.    Genitourinary:  Positive for urgency.   Musculoskeletal:  Positive for back pain.   Skin: Negative.    Allergic/Immunologic: Negative.    Neurological: Negative.    Hematological: Negative.    Psychiatric/Behavioral: Negative.          Physical Exam  Constitutional:       Appearance: Normal appearance.      Comments: Rollator    HENT:      Head: Normocephalic and atraumatic.      Right Ear: External ear normal.      Left Ear: External ear normal.      Nose: Nose normal.      Mouth/Throat:      Pharynx: Oropharynx is clear.   Eyes:      Conjunctiva/sclera: Conjunctivae normal.   Neck:      Comments: Very limited ROM cervical   Cardiovascular:      Rate and Rhythm: Normal rate and regular rhythm.      Heart sounds: Normal heart sounds.   Pulmonary:      Effort: Pulmonary effort is normal.      Breath sounds: Normal breath sounds.   Abdominal:      General: Abdomen is flat.      Palpations: Abdomen is soft.   Musculoskeletal:         General: Normal range of motion.      Left lower leg: Edema (chronic mild since injury) present.   Skin:     General: Skin is warm and dry.   Neurological:      General: No focal deficit present.      Mental Status: He is alert and oriented to person, place, and time.   Psychiatric:         Mood and Affect: Mood normal.         Behavior: Behavior normal.         Thought Content: Thought content normal.         Judgment: Judgment normal.          PAT AIRWAY:   Airway:     Mallampati::  II    Neck ROM::  Limited   Upper partial right    partials      Lab Results   Component Value Date    WBC 3.8 (L) 09/19/2024    HGB 13.4 (L) 09/19/2024    HCT 39.3 (L) 09/19/2024    MCV 98 09/19/2024     09/19/2024      Lab Results   Component Value Date    GLUCOSE 261 (H) 09/19/2024     CALCIUM 9.1 09/19/2024     09/19/2024    K 3.9 09/19/2024    CO2 28 09/19/2024     09/19/2024    BUN 22 09/19/2024    CREATININE 0.98 09/19/2024      Lab Results   Component Value Date    ALT 12 09/19/2024    AST 13 09/19/2024    ALKPHOS 105 09/19/2024    BILITOT 1.0 09/19/2024              Protime  9.8 - 12.8 seconds 12.1 10.9 R 11.6 R 11.9 R    INR  0.9 - 1.1 1.1 1.0 1.0 1.1    aPTT  27 - 38 seconds 30  33 R, CM         Lab Results   Component Value Date    HGBA1C 7.6 (H) 09/19/2024      UC 9/4/24  Urine Culture No significant growth        EKG 9/24/24  NSR  Prolonged QT  79 BPM     ECHO STRESS 5/22/24  Summary:   1. The resting ejection fraction was estimated at 65 to 70% with a peak exercise ejection fraction estimated at >75%.   2. Normal global left ventricular systolic function.   3. PEAK HR= 129 which is 88% of APMHR.   4. PEAK BP= 153/72.   5. LOWEST BP= 85/49.   6. PEAK Dobutamine Dose= 30mcg/kg/min.   7. No Atropine given, Pt has Glaucoma and it was not indicated.   8. Definity used for imaging.   9. Adequate level of stress achieved.  10. At peak dose and HR a LV intracavitray gradient was noted in excess of 100mmHG.  11. Consider d/c HCTZ and add beta blocker for BP control.  12. No clinical, echocardiographic or electrocardiographic evidence for ischemia at a maximal infusion.  13. Normal Stress Test.      CT cardiac score 1/26/24  LM 0,  .61,  LCx 21.04,  RCA 45.94,  IMPRESSION:  1. Coronary artery calcium score of 272.59*.  2. Ectatic ascending thoracic aorta measuring 3.9 cm. This is  unchanged compared to prior study from 11/14/2018        Total 272.59    RCRI  0  , 3.9 % Risk of MACE    Cardiac  HTN - lisinopril - HOLD 24 hours prior to surgery               clonidine Continue DOS   HLD - rosuvastatin - Continue DOS      Renal  OAB - oxybutynin HOLD DOS     Endocrinology  DM2 - no present medications    Hematology       Patient instructed to ambulate as soon as possible  postoperatively to decrease thromboembolic risk.      Initiate mechanical DVT prophylaxis as soon as possible and initiate chemical prophylaxis when deemed safe from a bleeding standpoint post surgery.       VTE prophylaxis per surgical team     GI  GERD - pantoprazole Continue DOS     Tests ordered in PAT: (Done 9/19 cbc, cmp, coag, A1c)    mrsa, EKG , COVID  LABS REVIEWED from 9/19/24:   Glu 261    A1c 7.6%   COVID negative     Risk assessment complete.  Patient is scheduled for a low surgical risk procedure.        Preoperative medication instructions were provided and reviewed with the patient.  Any additional testing or evaluation was explained to the patient.  Nothing by mouth instructions were discussed and patient's questions were answered prior to conclusion to this encounter.  Patient verbalized understanding of preoperative instructions given in preadmission testing; discharge instructions available in EMR.    This note was dictated by a speech recognition.  Minor errors may have been detected in a speech recognition.

## 2024-09-24 NOTE — TELEPHONE ENCOUNTER
Unable to reach patient by phone left voice mail message for Patient to call [phone] to retrieve Dr. Corbett message.

## 2024-09-24 NOTE — TELEPHONE ENCOUNTER
----- Message from Libra Corbett sent at 9/22/2024  3:41 PM EDT -----  HIGH A1C and sugar! What are home fasting sugars? Is he taking Mounjaro and Farxiga? Would he see an endocrinologist?

## 2024-09-24 NOTE — PREPROCEDURE INSTRUCTIONS
"   Medication List            Accurate as of September 24, 2024  2:33 PM. Always use your most recent med list.                acetaminophen 500 mg tablet  Commonly known as: Tylenol  Notes to patient: Use if needed morning of surgery     acyclovir 800 mg tablet  Commonly known as: Zovirax  TAKE 1 TABLET EVERY DAY  Medication Adjustments for Surgery: Take/Use as prescribed     chlorhexidine 0.12 % solution  Commonly known as: Peridex  15 ml swish and spit for 30 seconds night prior to surgery and morning of surgery     cholecalciferol 50 MCG (2000 UT) tablet  Commonly known as: Vitamin D-3  Medication Adjustments for Surgery: Do Not take on the morning of surgery     cloNIDine 0.3 mg tablet  Commonly known as: Catapres  TAKE 1 TABLET TWICE DAILY  Medication Adjustments for Surgery: Take on the morning of surgery     cyanocobalamin 500 mcg tablet  Commonly known as: Vitamin B-12  Medication Adjustments for Surgery: Do Not take on the morning of surgery     dapagliflozin propanediol 10 mg  Commonly known as: Farxiga  Take 1 tablet (10 mg) by mouth once daily.  Notes to patient: HOLD 3 days prior to surgery - Patient states not taking this medication      hydrOXYzine HCL 25 mg tablet  Commonly known as: Atarax  Take 1 tablet (25 mg) by mouth 3 times a day.  Medication Adjustments for Surgery: Take/Use as prescribed     insulin syr/ndl U100 half mario 0.5 mL 31 gauge x 5/16\" syringe     latanoprost 0.005 % ophthalmic solution  Commonly known as: Xalatan  Administer 1 drop into the left eye once daily at bedtime.  Medication Adjustments for Surgery: Take/Use as prescribed     lidocaine 5 % patch  Commonly known as: Lidoderm  Place 1 patch over 12 hours on the skin once daily. Apply to painful area 12 hours per day, remove for 12 hours.  Medication Adjustments for Surgery: Do Not take on the morning of surgery     lisinopril 40 mg tablet  TAKE 1 TABLET EVERY DAY  Medication Adjustments for Surgery: Take last dose 1 day (24 " hours) before surgery     magnesium oxide 400 mg tablet  Commonly known as: Mag-Ox  Medication Adjustments for Surgery: Do Not take on the morning of surgery     melatonin 3 mg tablet  Notes to patient: Continue night prior to surgery     montelukast 10 mg tablet  Commonly known as: Singulair  TAKE 1 TABLET EVERY DAY  Notes to patient: Use if needed morning of surgery     Mounjaro 7.5 mg/0.5 mL pen injector  Generic drug: tirzepatide  Inject 7.5 mg under the skin 1 (one) time per week.  Notes to patient: HOLD 7 days prior to surgery - Patient states not taking this medication      OneTouch Ultra Test strip  Generic drug: blood sugar diagnostic     oxybutynin XL 10 mg 24 hr tablet  Commonly known as: Ditropan-XL  TAKE 1 TABLET EVERY DAY  Medication Adjustments for Surgery: Do Not take on the morning of surgery     pantoprazole 20 mg EC tablet  Commonly known as: ProtoNix  TAKE 1 TABLET EVERY DAY  Medication Adjustments for Surgery: Take on the morning of surgery     rosuvastatin 10 mg tablet  Commonly known as: Crestor  TAKE 1 TABLET ONE TIME DAILY  Medication Adjustments for Surgery: Take/Use as prescribed     tiZANidine 4 mg tablet  Commonly known as: Zanaflex  TAKE 1 TABLET THREE TIMES DAILY  Notes to patient: Use if needed morning of surgery     Ultra-Light Rollator misc  Generic drug: walker  Use for ambulation                 Concerning above medication instructions- If medication is normally taken at night continue normal schedule - do not take night prior and morning of surgery.     CONTACT SURGEON'S OFFICE IF YOU DEVELOP:  * Fever = 100.4 F   * New respiratory symptoms (e.g. cough, shortness of breath, respiratory distress, sore throat)  * Recent loss of taste or smell  *Flu like symptoms such as headache, fatigue or gastrointestinal symptoms  * You develop any open sores, shingles, burning or painful urination   AND/OR:  * You no longer wish to have the surgery.  * Any other personal circumstances change  that may lead to the need to cancel or defer this surgery.  *You were admitted to any hospital within one week of your planned procedure.    SMOKING:  *Quitting smoking can make a huge difference to your health and recovery from surgery.    *If you need help with quitting, call 2-549-QUIT-NOW.    DAY BEFORE SURGERY:  Nothing by mouth (solid or liquid) after midnight the night before your surgery/procedure.           If DIABETIC - Please check fasting blood sugar upon waking up.  If fasting sugar is <80 mg/dl, please drink 100ml/3oz of apple juice no later than 2 hours prior to arrival time to surgery.      SURGICAL TIME  *You will be contacted between 2 p.m. and 6 p.m. the business day before your surgery with your arrival time.  *If you haven't received a call by 6pm, call [phone].  *Scheduled surgery times may change and you will be notified if this occurs-check your personal voicemail for any updates.    ON THE MORNING OF SURGERY:  *Wear comfortable, loose fitting clothing.   *Do not use moisturizers, creams, lotions or perfume.  *All jewelry and valuables should be left at home.  *Prosthetic devices such as contact lenses, hearing aids, dentures, eyelash extensions, hairpins and body piercing must be removed before surgery.    BRING WITH YOU:  *Photo ID and insurance card  *Current list of medicines and allergies  *Pacemaker/Defibrillator/Heart stent cards  *CPAP machine and mask  *Slings/splints/crutches  *Copy of your complete Advanced Directive/DHPOA-if applicable  *Neurostimulator implant remote    PARKING AND ARRIVAL:  *Check in at the Main Entrance desk and let them know you are here for surgery.  *You will be directed to the 2nd floor surgical waiting area.    AFTER OUTPATIENT SURGERY:  *A responsible adult MUST accompany you at the time of discharge and stay with you for 24 hours after your surgery.  *You may NOT drive yourself home after surgery.  *You may use a taxi or ride sharing service ([employer],  [employer]) to return home ONLY if you are accompanied by a friend or family member.  *Instructions for resuming your medications will be provided by your surgeon.      Home Preoperative Antibacterial Shower     What is a home preoperative antibacterial shower?  This shower is a way of cleaning the skin with a germ killing soap before surgery.  The soap contains chlorhexidine, commonly known as CHG.  CHG is a soap for your skin with germ killing ability.  Let your doctor know if you are allergic to chlorhexidine.    Why do I need to take a preoperative antibacterial shower?  Skin is not sterile.  It is best to try to make your skin as free of germs as possible before surgery.  Proper cleansing with a germ killing soap before surgery can lower the number of germs on your skin.  This helps to reduce the risk of infection at the surgical site.  Following the instructions listed below will help you prepare your skin for surgery.      How do I use the CHG skin cleanser?  Steps:  Begin using your CHG soap five days before your scheduled surgery on ________________________.    Days 1-4 Shower before bed:  Wash your face and genitals with your normal soap and rinse.    Wash and rinse your hair using the CHG soap. Rinse completely, do not condition your   Hair.          3.    Apply the CHG soap to a clean wet washcloth.  Turn the water off or move away                From the water spray to avoid premature rinsing of the CHG soap as you are applying.     4.   Lather your entire body from the neck down.  Do not use on your face or genitals.   Pay special attention to the area(s) where your incision(s) will be located unless they are on your face.  Avoid scrubbing your skin too hard.  The important point is to have the CHG soap sit on your skin for 3 minutes.    When the 3 minutes are up, turn on the water and rinse the CHG soap off your body completely.   Pat yourself dry with a clean, freshly-laundered towel.  Dress in clean,  freshly laundered night clothes.    Be sure to sleep with clean, freshly laundered sheets.  Day 5:  Last shower is the morning before surgery: Follow above Instructions.    NOTE:    *Hair extensions should be removed    *Keep CHG soap out of eyes and ear canals   *DO NOT wash with regular soap on your body after you have used the CHG        soap solution  *DO NOT apply powders, lotions, or perfume.  *Deodorant may be used days 1-4, BUT NOT the day of surgery    Who should I contact if I have any questions regarding the use of CHG soap?  Call the [facility], Preadmission Testing at [phone] if you have any questions.              Patient Information: Pre-Operative Infection Prevention Measures     Why did I have my nose, under my arms and groin swabbed?  The purpose of the swab is to identify Staphylococcus aureus inside your nose or on your skin.  The swab was sent to the laboratory for culture.  A positive swab/culture for Staphylococcus aureus is called colonization or carriage.      What is Staphylococcus aureus?  Staphylococcus aureus, also known as “staph”, is a germ found on the skin or in the nose of healthy people.  Sometimes Staphylococcus aureus can get into the body and cause an infection.  This can be minor (such as pimples, boils or other skin problems).  It might also be serious (such as blood infection, pneumonia or a surgical site infection).    What is Staphylococcus aureus colonization or carriage?  Colonization or carriage means that a person has the germ but is not sick from it.  These bacteria can be spread on the hands or when breathing or sneezing.    How is Staphylococcus aureus spread?  It is most often spread by close contact with a person or item that carries it.    What happens if my culture is positive for Staphylococcus aureus?  Your doctor/medical team will use this information to guide any antibiotic treatment which may be necessary.  Regardless of  the culture results, we will clean the inside of your nose with a betadine swab just before you have your surgery.      Will I get an infection if I have Staphylococcus aureus in my nose or on my skin?  Anyone can get an infection with Staphylococcus aureus.  However, the best way to reduce your risk of infection is to follow the instructions provided to you for the use of your CHG soap and dental rinse.        Who should I contact if I have any questions?  Call the [facility], Preadmission Testing at [phone] if you have any questions.           Patient Information: Oral/Dental Rinse  **This is a prescription; pick it up at your preferred local pharmacy **  What is oral/dental rinse?   It is a mouthwash. It is a way of cleaning the mouth with a germ killing solution before your surgery.  The solution contains chlorhexidine, commonly known as CHG.   It is used inside the mouth to kill a bacteria known as Staphylococcus aureus.  Let your doctor know if you are allergic to Chlorhexidine.    Why do I need to use CHG oral/dental rinse?  The CHG oral/dental rinse helps to kill a bacteria in your mouth known a Staphylococcus aureus.     This reduces the risk of infection at the surgical site.      Using your CHG oral/dental rinse  STEPS:  Use your CHG oral/dental rinse after you brush your teeth the night before (at bedtime) and the morning of your surgery.  Follow all directions on your prescription label.    Use the cap on the container to measure 15ml (fill cap to fill line)  Swish (gargle if you can) the mouthwash in your mouth for at least 30 seconds, (do not to swallow) spit out  After you use your CHG rinse, do not rinse your mouth with water, drink or eat.  Please refer to prescription label for the appropriate time to resume oral intake  Dental rinse comes in one size bottle: 473ml ~16oz.  You will have leftover    rinse, discard after this use.    What side effects might I have  using the CHG oral/dental rinse?  CHG rinse will stick to plaque on the teeth.  Brush and floss just before use.  Teeth brushing will help avoid staining of plaque during use.    Who should I contact if I have questions about the CHG oral/dental rinse?  Please call [facility], Preadmission Testing at [phone] if you have any questions

## 2024-09-25 ENCOUNTER — TELEPHONE (OUTPATIENT)
Dept: PRIMARY CARE | Facility: CLINIC | Age: 74
End: 2024-09-25
Payer: MEDICARE

## 2024-09-25 DIAGNOSIS — E11.9 DIABETES MELLITUS WITHOUT OPHTHALMIC MANIFESTATIONS (MULTI): Primary | ICD-10-CM

## 2024-09-25 LAB
ATRIAL RATE: 79 BPM
P AXIS: 76 DEGREES
P OFFSET: 182 MS
P ONSET: 117 MS
PR INTERVAL: 196 MS
Q ONSET: 215 MS
QRS COUNT: 13 BEATS
QRS DURATION: 88 MS
QT INTERVAL: 402 MS
QTC CALCULATION(BAZETT): 460 MS
QTC FREDERICIA: 440 MS
R AXIS: 44 DEGREES
T AXIS: 63 DEGREES
T OFFSET: 416 MS
VENTRICULAR RATE: 79 BPM

## 2024-09-25 NOTE — TELEPHONE ENCOUNTER
Patient called returning voicemail. He advised he is not taking Mounjaro or Farxiga due to the cost. He agrees to seeing an endocrinologist but would like for the referred endocrinologist to be at the [facility]. He also stated he does not do fasting sugars at home.

## 2024-09-25 NOTE — TELEPHONE ENCOUNTER
Unable to reach patient by phone left voice mail message for Patient to call [phone] to retrieve Dr. Corbett message.

## 2024-09-26 LAB — STAPHYLOCOCCUS SPEC CULT: NORMAL

## 2024-09-26 NOTE — TELEPHONE ENCOUNTER
Pt. left a voicemail message stating day before yesterday had a COVID test at [facility] for  Pre Admission. I called Pt. back at [phone] to inform him that the COVID test was normal and MRSA test was normal also.

## 2024-10-07 ENCOUNTER — TELEPHONE (OUTPATIENT)
Dept: PRIMARY CARE | Facility: CLINIC | Age: 74
End: 2024-10-07
Payer: MEDICARE

## 2024-10-07 DIAGNOSIS — E11.9 DIABETES MELLITUS WITHOUT OPHTHALMIC MANIFESTATIONS (MULTI): Primary | ICD-10-CM

## 2024-10-07 RX ORDER — INSULIN PUMP SYRINGE, 3 ML
1 EACH MISCELLANEOUS AS NEEDED
Qty: 1 EACH | Refills: 0 | Status: SHIPPED | OUTPATIENT
Start: 2024-10-07 | End: 2025-10-07

## 2024-10-07 RX ORDER — BLOOD SUGAR DIAGNOSTIC
STRIP MISCELLANEOUS
Qty: 100 STRIP | Refills: 0 | Status: SHIPPED | OUTPATIENT
Start: 2024-10-07

## 2024-10-07 RX ORDER — LANCETS 33 GAUGE
EACH MISCELLANEOUS
Qty: 100 EACH | Refills: 0 | Status: SHIPPED | OUTPATIENT
Start: 2024-10-07

## 2024-10-07 RX ORDER — LANCETS 26 GAUGE
EACH MISCELLANEOUS
Qty: 1 EACH | Refills: 0 | Status: SHIPPED | OUTPATIENT
Start: 2024-10-07 | End: 2025-10-07

## 2024-10-12 DIAGNOSIS — I10 HYPERTENSION, UNSPECIFIED TYPE: ICD-10-CM

## 2024-10-15 RX ORDER — LISINOPRIL 40 MG/1
40 TABLET ORAL DAILY
Qty: 90 TABLET | Refills: 0 | Status: SHIPPED | OUTPATIENT
Start: 2024-10-15

## 2024-10-22 ENCOUNTER — TELEMEDICINE (OUTPATIENT)
Dept: UROLOGY | Facility: HOSPITAL | Age: 74
End: 2024-10-22
Payer: MEDICARE

## 2024-10-22 DIAGNOSIS — N39.41 URGE INCONTINENCE OF URINE: Primary | ICD-10-CM

## 2024-10-22 PROCEDURE — 3060F POS MICROALBUMINURIA REV: CPT | Performed by: UROLOGY

## 2024-10-22 PROCEDURE — 99213 OFFICE O/P EST LOW 20 MIN: CPT | Performed by: UROLOGY

## 2024-10-22 PROCEDURE — 3051F HG A1C>EQUAL 7.0%<8.0%: CPT | Performed by: UROLOGY

## 2024-10-22 PROCEDURE — 3048F LDL-C <100 MG/DL: CPT | Performed by: UROLOGY

## 2024-10-22 PROCEDURE — 4010F ACE/ARB THERAPY RXD/TAKEN: CPT | Performed by: UROLOGY

## 2024-10-22 PROCEDURE — G2211 COMPLEX E/M VISIT ADD ON: HCPCS | Performed by: UROLOGY

## 2024-10-22 NOTE — PROGRESS NOTES
"FUV    Virtual or Telephone Consent    An interactive audio and video telecommunication system which permits real time communications between the patient (at the originating site) and provider (at the distant site) was utilized to provide this telehealth service.   Verbal consent was requested and obtained from Elliot Larson on this date, 10/22/24 for a telehealth visit.      Last seen - 3/1/23     HISTORY OF PRESENT ILLNESS:   Elliot Larson is a 74 y.o. male who is being seen today for fuv    h/o UTIs/prostatitis, BPH s/p Urolift, h/o ED, s/p IPP     Patient underwent IPP placement on 12/20/19. AMS Cx 18+3, MS pump, 100cc conceal reservoir.      Pt with bothersome urge incontinence. Stream ok at times. PVRs have been low. UA's negative except for glucose.      Trial of mybetriq at prior visit, also tried gemteza. Currently on oxybutynin     Cysto (1.14.22) - no strictures, non obstructive prostate, normal bladder    Has fallen and broke his femur since last visit.  Very poor mobility.      Pt had [employer] PNE on 9/6/24.  Inconclusive results    PAST MEDICAL HISTORY:  Past Medical History:   Diagnosis Date    Adverse effect of anesthesia     After hip surgery \"took 3 days to come off breathing machine\"    Anemia     Follows with heme/onc Manny Chandra, APRN-CNP LOV 5/1/24    Asthma ([facility])     Delayed emergence from general anesthesia     Depression     Dyslipidemia     Elevated coronary artery calcium score     CT 1/26/24  272.59    Enlarged prostate with lower urinary tract symptoms (LUTS)     GERD (gastroesophageal reflux disease)     under control    History of central retinal vein occlusion (CMS-HCC) 2000    right eye    Hypertension     Legally blind     right eye    Lung nodule     CT 4/17/24 No suspicious pulmonary nodules identified.    OAB (overactive bladder)     Plan: Stage 1 Insertion Continence Control Stimulator Sacral Lead 9/27/24    Osteoarthritis     Postlaminectomy syndrome, lumbar  " "   Thoracic ascending aortic aneurysm (CMS-HCC)     CT 1/26/24 3.9 cm and unchanged from 2018    Type 2 diabetes mellitus (Multi)     Urge incontinence        PAST SURGICAL HISTORY:  Past Surgical History:   Procedure Laterality Date    CATARACT EXTRACTION Bilateral     CERVICAL LAMINECTOMY      COLONOSCOPY      GASTRIC BYPASS      Gastric Surgery For Morbid Obesity Gastric Bypass    HERNIA REPAIR Left     Inguinal Hernia Repair x 2    LUMBAR FUSION      Lumbar Vertebral Fusion    LUMBAR LAMINECTOMY      MR HEAD ANGIO WO IV CONTRAST  04/05/2021    MR HEAD ANGIO WO IV CONTRAST 4/5/2021 [facility]    ORIF HIP FRACTURE Left 06/25/2023    ORIF HIP PROXIMAL END, NECK, SLIDING HIP SCREW    OTHER SURGICAL HISTORY Left 02/01/2022    REVISION JOINT TOTAL KNEE FEMORAL AND ENTIRE TIBIAL COMPONENT    PENILE PROSTHESIS IMPLANT      SEPTOPLASTY      TOTAL KNEE ARTHROPLASTY Bilateral     VITRECTOMY          ALLERGIES:   Allergies   Allergen Reactions    Ciprofloxacin Itching    Ketorolac Rash    Latex Itching    Penicillins Other     Skin sloughing        MEDICATIONS:   Current Outpatient Medications   Medication Instructions    acetaminophen (TYLENOL) 1,000 mg, oral, Every 8 hours PRN    acyclovir (ZOVIRAX) 800 mg, oral, Daily    Autolet lancing device Use as instructed    Blood glucose monitoring meter kit (OneTouch Ultra2 Meter) kit 1 each, miscellaneous, As needed, Check sugar every morning; dispense onetouch ultra meter    chlorhexidine (Peridex) 0.12 % solution 15 ml swish and spit for 30 seconds night prior to surgery and morning of surgery    cholecalciferol (Vitamin D-3) 50 MCG (2000 UT) tablet 2 tablets, oral, 2 times daily    cloNIDine (CATAPRES) 0.3 mg, oral, 2 times daily    cyanocobalamin (VITAMIN B-12) 1,000 mcg, oral, Daily RT    dapagliflozin propanediol (FARXIGA) 10 mg, oral, Daily    hydrOXYzine HCL (ATARAX) 25 mg, oral, 3 times daily    insulin syr/ndl U100 half mario 0.5 mL 31 gauge x 5/16\" syringe " miscellaneous, With meals    lancets (OneTouch Delica Plus Lancet) 33 gauge misc Check sugar daily    latanoprost (Xalatan) 0.005 % ophthalmic solution 1 drop, Left Eye, Nightly    lidocaine (Lidoderm) 5 % patch 1 patch, transdermal, Daily, Apply to painful area 12 hours per day, remove for 12 hours.    lisinopril 40 mg, oral, Daily    magnesium oxide (MAG-OX) 400 mg, oral, Daily    melatonin 3 mg tablet 1 tablet, oral, Nightly    montelukast (SINGULAIR) 10 mg, oral, Daily    Mounjaro 7.5 mg, subcutaneous, Once Weekly    OneTouch Ultra Test strip Check sugar every morning    oxybutynin XL (Ditropan-XL) 10 mg 24 hr tablet TAKE 1 TABLET EVERY DAY    pantoprazole (PROTONIX) 20 mg, oral, Daily    rosuvastatin (CRESTOR) 10 mg, oral, Daily    tiZANidine (ZANAFLEX) 4 mg, oral, 3 times daily    walker (Ultra-Light Rollator) misc Use for ambulation        PHYSICAL EXAM:  There were no vitals taken for this visit.  Constitutional: Patient appears well-developed and well-nourished. No distress.      Labs  Lab Results   Component Value Date    TESTOSTERONE 399 08/28/2020    TESTOSTERONE 263 08/26/2020     Lab Results   Component Value Date    PSA 1.49 09/19/2024     Lab Results   Component Value Date    GFRMALE 81 09/08/2023     Lab Results   Component Value Date    CREATININE 0.98 09/19/2024     Lab Results   Component Value Date    CHOL 131 09/19/2024     Lab Results   Component Value Date    HDL 57.1 09/19/2024     Lab Results   Component Value Date    CHHDL 2.3 09/19/2024     Lab Results   Component Value Date    LDLF 34 09/08/2023     Lab Results   Component Value Date    VLDL 27 09/19/2024     Lab Results   Component Value Date    TRIG 133 09/19/2024     Lab Results   Component Value Date    HGBA1C 7.6 (H) 09/19/2024     Lab Results   Component Value Date    HCT 39.3 (L) 09/19/2024     UA neg  PVR 0      Assessment:      1. Urge incontinence of urine          Elliot Larson is a 74 y.o. male here for FUV     Plan:    Inconclusive PNE, considering Stage 1  Do not think AUS would be good option    Patient continues to have bothersome urinary symptoms despite conservative measures and had tried at least 2 medications (anti-cholinergic or beta-agonist).  We discussed OAB pathway as above.  We went into detail about [employer] neuromodulation.  We discussed that this is a long-term treatment that helps to restore communication between the brain, bladder and bowel.    We discussed the peripheral nerve evaluation (PNE).  That it would be done in the office setting with local anesthetic.  Two tiny wires, or leads, would be inserted in the upper buttock.  This would be guided by bony landmarks and patients sensory responses.  Once we confirmed the correct location the leads would be connected to an external device and secured to the patients back.  They would return home with no major restrictions, just no showering or strenuous activity.  They will keep a voiding diary to monitor their responses. They will follow up in office the week after to have the leads removed and review the voiding diary.  A successful trial is deemed if >50% response.    If we decide to move forward with permanent implant that will be done in the operating room with light sedation.  A permanent lead would be placed under fluoroscopic guidance to confirm correct positioning.  This is then connected to an internal pulse generator which can last up to 20 years.    Risks of change in sensation, pain, irritation, bleeding, movement of the lead and implant infection discussed.    Patient in agreement and wants to proceed.

## 2024-10-29 ENCOUNTER — TELEPHONE (OUTPATIENT)
Dept: HEMATOLOGY/ONCOLOGY | Facility: CLINIC | Age: 74
End: 2024-10-29
Payer: MEDICARE

## 2024-11-04 ENCOUNTER — TELEPHONE (OUTPATIENT)
Dept: HEMATOLOGY/ONCOLOGY | Facility: CLINIC | Age: 74
End: 2024-11-04
Payer: MEDICARE

## 2024-11-05 ENCOUNTER — APPOINTMENT (OUTPATIENT)
Dept: HEMATOLOGY/ONCOLOGY | Facility: CLINIC | Age: 74
End: 2024-11-05
Payer: MEDICARE

## 2024-11-07 ENCOUNTER — TELEPHONE (OUTPATIENT)
Dept: PRIMARY CARE | Facility: CLINIC | Age: 74
End: 2024-11-07
Payer: MEDICARE

## 2024-11-07 DIAGNOSIS — R39.15 URINARY URGENCY: ICD-10-CM

## 2024-11-07 DIAGNOSIS — N39.0 URINARY TRACT INFECTION WITHOUT HEMATURIA, SITE UNSPECIFIED: ICD-10-CM

## 2024-11-07 NOTE — TELEPHONE ENCOUNTER
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 left a voicemail message stating he needs the number of the surgeon  that did his hernia several years ago he feel he have another hernia if he do not answer leave the information on his voicemail [phone].

## 2024-11-08 RX ORDER — SULFAMETHOXAZOLE AND TRIMETHOPRIM 800; 160 MG/1; MG/1
1 TABLET ORAL 2 TIMES DAILY
Qty: 10 TABLET | Refills: 0 | Status: SHIPPED | OUTPATIENT
Start: 2024-11-08 | End: 2024-11-13

## 2024-11-13 ENCOUNTER — TELEPHONE (OUTPATIENT)
Dept: PRIMARY CARE | Facility: CLINIC | Age: 74
End: 2024-11-13
Payer: MEDICARE

## 2024-11-13 DIAGNOSIS — K86.9 LESION OF PANCREAS (HHS-HCC): Primary | ICD-10-CM

## 2024-11-13 NOTE — TELEPHONE ENCOUNTER
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 left a voicemail message stating need for you to order a MRI with contrast and without contrast  for his abdomen if with questions call Pt. Back at [phone]. Pt. also stated that he gets one every year his GI doctor is not with [facility]

## 2024-11-20 ENCOUNTER — TELEPHONE (OUTPATIENT)
Dept: HEMATOLOGY/ONCOLOGY | Facility: CLINIC | Age: 74
End: 2024-11-20
Payer: MEDICARE

## 2024-11-21 ENCOUNTER — LAB (OUTPATIENT)
Dept: LAB | Facility: LAB | Age: 74
End: 2024-11-21
Payer: MEDICARE

## 2024-11-21 ENCOUNTER — APPOINTMENT (OUTPATIENT)
Dept: SURGERY | Facility: CLINIC | Age: 74
End: 2024-11-21
Payer: MEDICARE

## 2024-11-21 VITALS
DIASTOLIC BLOOD PRESSURE: 111 MMHG | WEIGHT: 218.3 LBS | HEART RATE: 85 BPM | SYSTOLIC BLOOD PRESSURE: 196 MMHG | BODY MASS INDEX: 33.69 KG/M2 | TEMPERATURE: 98.1 F

## 2024-11-21 DIAGNOSIS — R10.11 RIGHT UPPER QUADRANT ABDOMINAL PAIN: ICD-10-CM

## 2024-11-21 DIAGNOSIS — D69.6 THROMBOCYTOPENIA (CMS-HCC): ICD-10-CM

## 2024-11-21 DIAGNOSIS — D53.9 MACROCYTIC ANEMIA: ICD-10-CM

## 2024-11-21 DIAGNOSIS — Z12.11 COLON CANCER SCREENING: Primary | ICD-10-CM

## 2024-11-21 DIAGNOSIS — R39.15 URINARY URGENCY: ICD-10-CM

## 2024-11-21 DIAGNOSIS — D53.9 MACROCYTIC ANEMIA: Primary | ICD-10-CM

## 2024-11-21 LAB
BASOPHILS # BLD AUTO: 0.02 X10*3/UL (ref 0–0.1)
BASOPHILS NFR BLD AUTO: 0.3 %
EOSINOPHIL # BLD AUTO: 0.21 X10*3/UL (ref 0–0.4)
EOSINOPHIL NFR BLD AUTO: 3.6 %
ERYTHROCYTE [DISTWIDTH] IN BLOOD BY AUTOMATED COUNT: 12.3 % (ref 11.5–14.5)
HCT VFR BLD AUTO: 39.1 % (ref 41–52)
HGB BLD-MCNC: 13.7 G/DL (ref 13.5–17.5)
IMM GRANULOCYTES # BLD AUTO: 0.02 X10*3/UL (ref 0–0.5)
IMM GRANULOCYTES NFR BLD AUTO: 0.3 % (ref 0–0.9)
LYMPHOCYTES # BLD AUTO: 1.79 X10*3/UL (ref 0.8–3)
LYMPHOCYTES NFR BLD AUTO: 30.7 %
MCH RBC QN AUTO: 33.7 PG (ref 26–34)
MCHC RBC AUTO-ENTMCNC: 35 G/DL (ref 32–36)
MCV RBC AUTO: 96 FL (ref 80–100)
MONOCYTES # BLD AUTO: 0.36 X10*3/UL (ref 0.05–0.8)
MONOCYTES NFR BLD AUTO: 6.2 %
NEUTROPHILS # BLD AUTO: 3.43 X10*3/UL (ref 1.6–5.5)
NEUTROPHILS NFR BLD AUTO: 58.9 %
NRBC BLD-RTO: 0 /100 WBCS (ref 0–0)
PLATELET # BLD AUTO: 234 X10*3/UL (ref 150–450)
RBC # BLD AUTO: 4.07 X10*6/UL (ref 4.5–5.9)
VIT B12 SERPL-MCNC: 820 PG/ML (ref 211–911)
WBC # BLD AUTO: 5.8 X10*3/UL (ref 4.4–11.3)

## 2024-11-21 PROCEDURE — 81003 URINALYSIS AUTO W/O SCOPE: CPT

## 2024-11-21 PROCEDURE — 3060F POS MICROALBUMINURIA REV: CPT | Performed by: SURGERY

## 2024-11-21 PROCEDURE — 36415 COLL VENOUS BLD VENIPUNCTURE: CPT

## 2024-11-21 PROCEDURE — 99213 OFFICE O/P EST LOW 20 MIN: CPT | Performed by: SURGERY

## 2024-11-21 PROCEDURE — 80053 COMPREHEN METABOLIC PANEL: CPT

## 2024-11-21 PROCEDURE — 1160F RVW MEDS BY RX/DR IN RCRD: CPT | Performed by: SURGERY

## 2024-11-21 PROCEDURE — 3051F HG A1C>EQUAL 7.0%<8.0%: CPT | Performed by: SURGERY

## 2024-11-21 PROCEDURE — 3077F SYST BP >= 140 MM HG: CPT | Performed by: SURGERY

## 2024-11-21 PROCEDURE — 82728 ASSAY OF FERRITIN: CPT

## 2024-11-21 PROCEDURE — 4010F ACE/ARB THERAPY RXD/TAKEN: CPT | Performed by: SURGERY

## 2024-11-21 PROCEDURE — 1125F AMNT PAIN NOTED PAIN PRSNT: CPT | Performed by: SURGERY

## 2024-11-21 PROCEDURE — 1036F TOBACCO NON-USER: CPT | Performed by: SURGERY

## 2024-11-21 PROCEDURE — 3080F DIAST BP >= 90 MM HG: CPT | Performed by: SURGERY

## 2024-11-21 PROCEDURE — 82607 VITAMIN B-12: CPT

## 2024-11-21 PROCEDURE — 85025 COMPLETE CBC W/AUTO DIFF WBC: CPT

## 2024-11-21 PROCEDURE — 83540 ASSAY OF IRON: CPT

## 2024-11-21 PROCEDURE — 83550 IRON BINDING TEST: CPT

## 2024-11-21 PROCEDURE — 1159F MED LIST DOCD IN RCRD: CPT | Performed by: SURGERY

## 2024-11-21 PROCEDURE — 3048F LDL-C <100 MG/DL: CPT | Performed by: SURGERY

## 2024-11-21 ASSESSMENT — PAIN SCALES - GENERAL: PAINLEVEL_OUTOF10: 6

## 2024-11-22 ENCOUNTER — APPOINTMENT (OUTPATIENT)
Dept: HEMATOLOGY/ONCOLOGY | Facility: CLINIC | Age: 74
End: 2024-11-22
Payer: MEDICARE

## 2024-11-22 ENCOUNTER — TELEMEDICINE (OUTPATIENT)
Dept: HEMATOLOGY/ONCOLOGY | Facility: CLINIC | Age: 74
End: 2024-11-22
Payer: MEDICARE

## 2024-11-22 DIAGNOSIS — D69.6 THROMBOCYTOPENIA (CMS-HCC): Primary | ICD-10-CM

## 2024-11-22 DIAGNOSIS — D53.9 MACROCYTIC ANEMIA: ICD-10-CM

## 2024-11-22 LAB
ALBUMIN SERPL BCP-MCNC: 4.4 G/DL (ref 3.4–5)
ALP SERPL-CCNC: 83 U/L (ref 33–136)
ALT SERPL W P-5'-P-CCNC: 19 U/L (ref 10–52)
ANION GAP SERPL CALC-SCNC: 18 MMOL/L (ref 10–20)
APPEARANCE UR: CLEAR
AST SERPL W P-5'-P-CCNC: 20 U/L (ref 9–39)
BILIRUB SERPL-MCNC: 1.3 MG/DL (ref 0–1.2)
BILIRUB UR STRIP.AUTO-MCNC: NEGATIVE MG/DL
BUN SERPL-MCNC: 22 MG/DL (ref 6–23)
CALCIUM SERPL-MCNC: 9.5 MG/DL (ref 8.6–10.6)
CHLORIDE SERPL-SCNC: 103 MMOL/L (ref 98–107)
CO2 SERPL-SCNC: 24 MMOL/L (ref 21–32)
COLOR UR: NORMAL
CREAT SERPL-MCNC: 1.01 MG/DL (ref 0.5–1.3)
EGFRCR SERPLBLD CKD-EPI 2021: 78 ML/MIN/1.73M*2
FERRITIN SERPL-MCNC: 150 NG/ML (ref 20–300)
GLUCOSE SERPL-MCNC: 168 MG/DL (ref 74–99)
GLUCOSE UR STRIP.AUTO-MCNC: NORMAL MG/DL
IRON SATN MFR SERPL: 27 % (ref 25–45)
IRON SERPL-MCNC: 90 UG/DL (ref 35–150)
KETONES UR STRIP.AUTO-MCNC: NEGATIVE MG/DL
LEUKOCYTE ESTERASE UR QL STRIP.AUTO: NEGATIVE
NITRITE UR QL STRIP.AUTO: NEGATIVE
PH UR STRIP.AUTO: 5 [PH]
POTASSIUM SERPL-SCNC: 3.9 MMOL/L (ref 3.5–5.3)
PROT SERPL-MCNC: 7.1 G/DL (ref 6.4–8.2)
PROT UR STRIP.AUTO-MCNC: NEGATIVE MG/DL
RBC # UR STRIP.AUTO: NEGATIVE /UL
SODIUM SERPL-SCNC: 141 MMOL/L (ref 136–145)
SP GR UR STRIP.AUTO: 1.01
TIBC SERPL-MCNC: 328 UG/DL (ref 240–445)
UIBC SERPL-MCNC: 238 UG/DL (ref 110–370)
UROBILINOGEN UR STRIP.AUTO-MCNC: NORMAL MG/DL

## 2024-11-22 PROCEDURE — 3060F POS MICROALBUMINURIA REV: CPT

## 2024-11-22 PROCEDURE — 3048F LDL-C <100 MG/DL: CPT

## 2024-11-22 PROCEDURE — 4010F ACE/ARB THERAPY RXD/TAKEN: CPT

## 2024-11-22 PROCEDURE — 3051F HG A1C>EQUAL 7.0%<8.0%: CPT

## 2024-11-22 PROCEDURE — 99214 OFFICE O/P EST MOD 30 MIN: CPT

## 2024-11-22 NOTE — PROGRESS NOTES
History Of Present Illness  Elliot Larson is a 74 y.o. male presenting with what he thinks is a hernia in the right upper quadrant.  He is a previous laparoscopic Tata-en-Y along with a cholecystectomy.  I have known him from previously doing laparoscopic left inguinal hernia.  He is retired dialysis nurse.  Recently has had some trauma that caused fracture of his femur that affected his previous left knee replacement that would be redone.  Patient just felt this discomfort what he thinks was a hernia in his right upper quadrant.  He had a previous CT scan of his chest which I was able to review with him along with the previous MRI of his abdomen.  Neither these showed any evidence of abdominal wall hernias.  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  He does have some diarrhea and bowel changes.  His last colonoscopy was 10 years ago.  He is told he needed to follow-up in 10 years.        Last Recorded Vitals  Blood pressure (!) 196/111, pulse 85, temperature 36.7 °C (98.1 °F), weight 99 kg (218 lb 4.8 oz).  Physical Examination  Awake and alert.  He uses a walker because of his difficulty with his trauma to his knees.  I examined his abdomen both standing up and lying down.  He has no evidence of any hernia of his abdominal wall.  He is slightly tender right upper quadrant      Relevant Results I reviewed his CT scan and MRI with him.      Assessment/Plan patient with right upper quadrant tenderness.  I discussed my there is no evidence of any hernias.  For this may be just with this colonic diarrhea changes.  And the fact that he has Chillaidit's sign: Above his liver in this area.  I discussed it is time for his colonoscopy and I ordered the colonoscopy.  He will follow-up me as needed    Ney Loo MD FACS  Professor of Surgery  [facility] in Surgical [place]  [facility]  [facility]  [id number] [place] [address], [id number]  Phone [phone]  email: [email or web address]

## 2024-11-22 NOTE — PROGRESS NOTES
Patient ID: Elliot Larson is a 74 y.o. male.  Diagnosis: Anemia   Primary Care Provider: Libra Corbett MD  Referring Provider: Manny Chandra, APRN-[facility]  [address] [address] [place] [id number]  Visit Type: Follow Up    Date of Service: 11/22/24    Current Treatment: oral vitamin B12 500 mcg     Subjective      Mr. Larson is a 73 y/o M with a history of hypertension, glaucoma left eye, type 2 diabetes, recurrent UTIs, gastric bypass and BPH who presents for follow-up for anemia.     Reviewing patient's records patient is noted to have an anemia since at least every 2018 when his hemoglobin was 13. More recently he is noted to have a hemoglobin of 12.1. He denies any obvious blood loss in the stool or urine. States colonoscopy which  was completed last in November 2015 was normal. She also underwent an EGD at that time which was also normal. Patient underwent hernia repair in September 2019 and denies any significant blood loss in need of blood transfusions. Initial visit 12/4/19  started on vitamin B12 supplements.    2/20/23: Patient presents for follow-up for anemia. Blood work today shows a hemoglobin of 12.6 with no other cytopenias. Hemoglobin has been stable for some time. Patient has had several falls. He reports depression related to personal issues and he  would like a referral to a psychologist.      11/14/23: Labs done yesterday show WBC 4.6, hgb 13.9, plt 153,000, . Has had macrycytosis since at least Dec of 2020.  Since last visit patient had a fall and broke his hip in June 2023, he was hospitalized and had surgery followed by a stay at a rehab facility. He reports he is now feeling very well and energy is good. He denies signs or symptoms of bleeding other than mild with oral care at times. He is taking Monjuro so his appetite is decreased. He denies infections, fevers, chills, GI symptoms, SOB, chest pain or edema. He is walking with a walker and enjoys spending time with  "his dog and has a job interview tomorrow. He gets lunch delivered daily. He is due for pancreas MRI and last PCP visit was in August, due for colonoscopy in 2025.     PSHx: gastric bypass     Social Hx: Patient is a former smoker. Smokes marijuana. Patient is single. He has no children.    INTERVAL HISTORY     Elliot Larson is a 74 y.o. male who presents today for follow up of anemia.    HPI  Consent:  A telephone visit (audio only) between the patient at home and the provider at [facility] was utilized to provide this telehealth service.    Since last visit patient saw Dr. Loo in general surgery for RUQ pain, deemed to not have a hernia in that area. Ordered to have his colonoscopy. He reports he continues to take oral vitamin B12 daily 500 mcg. He says he is not taking oral iron but feels there is an iron taste in his mouth, denies new medications, signs of thrush or mouth sore, or issues with oral hygiene. He says his energy is not good, unchanged. He reports feeling \"pissed\" and tearful over life stressors including a law suit he has against his [email or web address] complex for falls/injuries. He reports ongoing chronic pain, he says he cannot see pain management due to smoking marijuana. He enjoys time with his dog. He is having a procedure in December for urinary incontinence.     Patient does not note issues with his appetite, GI system, signs of bleeding, chest pain, changes in breathing or other concerns.     Review of Systems - Oncology  ROS 14 points performed, See HPI for exceptions    PMHx:  Past Medical History:   Diagnosis Date    Adverse effect of anesthesia     After hip surgery \"took 3 days to come off breathing machine\"    Anemia     Follows with heme/onc Manny Chandra, APRN-CNP LOV 5/1/24    Asthma     Delayed emergence from general anesthesia     Depression     Dyslipidemia     Elevated coronary artery calcium score     CT 1/26/24  272.59    Enlarged prostate with lower " urinary tract symptoms (LUTS)     GERD (gastroesophageal reflux disease)     under control    History of central retinal vein occlusion (CMS-HCC) 2000    right eye    Hypertension     Legally blind     right eye    Lung nodule     CT 4/17/24 No suspicious pulmonary nodules identified.    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    Asthma 03/11/2023    Bilateral dry eyes 03/11/2023    Bilateral ulcerative blepharitis 03/11/2023    Blepharoptosis, left 03/11/2023    Blepharoptosis, right 03/11/2023    Blind right eye 03/11/2023    Legally blind in right eye, as defined in [place] 03/11/2023    Legally blind 03/11/2023    Central retinal vein occlusion of right eye (CMS-HCC) 03/11/2023    Chronic venous insufficiency 03/11/2023    Corneal epithelial and basement membrane dystrophy 03/11/2023    Corneal edema 03/11/2023    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 03/11/2023    Dependent edema 03/11/2023    Depression 03/11/2023    Disc degeneration, lumbar 03/11/2023    Dupuytren's contracture of left hand 03/11/2023    Dyslipidemia 03/11/2023    Enlarged prostate with lower urinary tract symptoms (LUTS) 03/11/2023    Erectile disorder, acquired, generalized, severe 03/11/2023    Male erectile disorder of organic origin 03/11/2023    GERD (gastroesophageal reflux disease) 03/11/2023    Herniation of cervical intervertebral disc with radiculopathy 03/11/2023    History of gastric bypass 03/11/2023    S/P insertion of penile implant 03/11/2023    Hypertension 03/11/2023    Hypogonadism male  03/11/2023    Insomnia 03/11/2023    Diabetes mellitus without ophthalmic manifestations (Multi) 03/11/2023    Lesion of pancreas ([facility]) 03/11/2023    Pancreatic abnormality 03/11/2023    Low libido 03/11/2023    Macrocytosis 03/11/2023    Narrow angle of anterior chamber of left eye 03/11/2023    Ocular hypertension of left eye 03/11/2023    Onychomycosis 03/11/2023    Osteoarthritis 03/11/2023    PAD (peripheral artery disease) (CMS-HCC) 03/11/2023    Postlaminectomy syndrome, lumbar 03/11/2023    Primary osteoarthritis of first carpometacarpal joint of right hand 03/11/2023    Pseudophakia of left eye 03/11/2023    PVD (posterior vitreous detachment) 03/11/2023    Renal calcification 03/11/2023    Retinal macular atrophy 03/11/2023    Tinea pedis of both feet 03/11/2023    Urge incontinence of urine 03/11/2023    Urinary urgency 03/11/2023    Vitamin D deficiency 03/11/2023    Xerosis cutis 03/11/2023    Anatomical narrow angle of left eye 03/11/2023    Gait disorder 02/20/2014    Periprosthetic fracture around internal prosthetic knee joint 01/31/2022    Ocular hypertension 08/30/2023    Generalized pruritus 11/06/2023    Elevated coronary artery calcium score 01/02/2024    Incidental lung nodule 01/20/2024    Recent fracture of hip (Multi) 02/14/2024    Femur fracture, left 02/14/2024    Closed hip fracture requiring operative repair with routine healing 07/19/2023    History of central retinal vein occlusion (CMS-HCC) 02/14/2024    History of vitrectomy 02/14/2024    Intertrochanteric fracture of left femur, closed, initial encounter 06/24/2023    Overactive bladder 02/14/2024    Presence of artificial knee joint, bilateral 02/06/2022    Presence of left artificial hip joint 02/06/2022    Diabetic nephropathy (Multi) 09/05/2024    OAB (overactive bladder) 09/16/2024    Urge incontinence 09/16/2024     Resolved Ambulatory Problems     Diagnosis Date Noted    Cervicogenic headache 03/11/2023    Aortic  dilatation (CMS-HCC) 03/11/2023    Arthralgia of multiple sites 03/11/2023    Cataract, nuclear sclerotic, right eye 03/11/2023    Chronic pancreatitis (Multi) 03/11/2023    Claustrophobia 03/11/2023    Enlarged prostate 03/11/2023    Other male erectile dysfunction 03/11/2023    Fatigue 03/11/2023    Infection due to urethral catheter (CMS-formerly [facility]) 03/11/2023    Hypokalemia 03/11/2023    Inguinal hernia, left 03/11/2023    Lateral epicondylitis of right elbow 03/11/2023    Left knee pain 03/11/2023    Low back pain 03/11/2023    Left lumbar radiculopathy 03/11/2023    Lumbar spondylosis 03/11/2023    Strain of trapezius muscle, right, initial encounter 03/11/2023    Other spondylosis, cervical region 03/11/2023    Thoracic radiculopathy 03/11/2023    Anemia 03/11/2023    Macrocytic anemia 03/11/2023    Myalgia 03/11/2023    Chronic headaches 03/11/2023    Fibromyalgia 03/11/2023    Limb pain 03/11/2023    Neck pain 03/11/2023    Neck strain 03/11/2023    Obesity 03/11/2023    Obstructive sleep apnea, adult 03/11/2023    Pain of finger of left hand 03/11/2023    PSA elevation 03/11/2023    Recurrent urinary tract infection 03/11/2023    Recurrent UTI 03/11/2023    Right elbow pain 03/11/2023    Thrombocytopenia (CMS-formerly [facility]) 03/11/2023    Thumb pain 03/11/2023    Urinary frequency 03/11/2023    Esophageal reflux 03/11/2023    Urinary tract infection associated with catheterization of urinary tract, subsequent encounter 03/11/2023    Acute blood loss anemia 02/01/2022    Acute bronchitis with chronic obstructive pulmonary disease (COPD) (Multi) 07/11/2015    Acute bronchitis 08/30/2023    Acute postoperative pulmonary insufficiency (CMS-formerly [facility]) 02/01/2022    Difficulty walking 04/24/2018    Disease due to severe acute respiratory syndrome coronavirus 2 (SARS-CoV-2) 02/01/2022    Disorder of left Achilles tendon 04/24/2018    Dupuytren's fibromatosis 08/30/2023    Postprocedural shock 02/01/2022    Acute headache 08/30/2023     Acute postoperative pain 02/01/2022    Chronic pancreatitis (Multi) 08/30/2023    Diabetes mellitus (Multi) 02/20/2014    Right lateral epicondylitis 08/30/2023    Obesity, Class II, BMI 35-39.9 02/02/2022    Recurrent urinary tract infection 08/30/2023    Recurrent UTI 08/30/2023    Catheter-associated urinary tract infection (CMS-HCC) 08/30/2023    S/p left hip fracture 10/09/2023    Diabetic retinopathy (Multi) 02/26/2015     Past Medical History:   Diagnosis Date    Adverse effect of anesthesia     Delayed emergence from general anesthesia     Lung nodule     Thoracic ascending aortic aneurysm (CMS-HCC)     Type 2 diabetes mellitus       PSHx:  Past Surgical History:   Procedure Laterality Date    CATARACT EXTRACTION Bilateral     CERVICAL LAMINECTOMY      COLONOSCOPY      GASTRIC BYPASS      Gastric Surgery For Morbid Obesity Gastric Bypass    HERNIA REPAIR Left     Inguinal Hernia Repair x 2    LUMBAR FUSION      Lumbar Vertebral Fusion    LUMBAR LAMINECTOMY      MR HEAD ANGIO WO IV CONTRAST  04/05/2021    MR HEAD ANGIO WO IV CONTRAST 4/5/2021 [facility]    ORIF HIP FRACTURE Left 06/25/2023    ORIF HIP PROXIMAL END, NECK, SLIDING HIP SCREW    OTHER SURGICAL HISTORY Left 02/01/2022    REVISION JOINT TOTAL KNEE FEMORAL AND ENTIRE TIBIAL COMPONENT    PENILE PROSTHESIS IMPLANT      SEPTOPLASTY      TOTAL KNEE ARTHROPLASTY Bilateral     VITRECTOMY       FHx:  Family History   Problem Relation Name Age of Onset    Other (BOWEL ISCHEMIA/INFARCTION) Mother      Atrial fibrillation Mother      Blood clot Mother      Coronary artery disease Father      Heart attack Father      No Known Problems Brother      Suicidality Maternal Grandfather        Social Hx:  Elliot Larson    reports that he quit smoking about 18 years ago. His smoking use included cigarettes. He started smoking about 55 years ago. He has a 54 pack-year smoking history. He has been exposed to tobacco smoke. He has never used smokeless  tobacco.  He  reports current alcohol use of about 21.0 standard drinks of alcohol per week.  He  reports current drug use. Drug: Marijuana.  Social History     Socioeconomic History    Marital status: Single   Tobacco Use    Smoking status: Former     Current packs/day: 0.00     Average packs/day: 2.0 packs/day for 27.0 years (54.0 ttl pk-yrs)     Types: Cigarettes     Start date:      Quit date:      Years since quittin.9     Passive exposure: Past    Smokeless tobacco: Never   Vaping Use    Vaping status: Never Used   Substance and Sexual Activity    Alcohol use: Yes     Alcohol/week: 21.0 standard drinks of alcohol     Types: 21 Glasses of wine per week     Comment: 3 glasses wine/day    Drug use: Yes     Types: Marijuana     Comment: smokes daily    Sexual activity: Defer     Social Drivers of Health     Financial Resource Strain: Low Risk  (2023)    Received from [facility]    Overall Financial Resource Strain (CARDIA)     Difficulty of Paying Living Expenses: Not very hard   Food Insecurity: No Food Insecurity (2023)    Received from [facility]    Hunger Vital Sign     Worried About Running Out of Food in the Last Year: Never true     Ran Out of Food in the Last Year: Never true   Transportation Needs: No Transportation Needs (2023)    Received from [facility]    PRAPARE - Transportation     Lack of Transportation (Medical): No     Lack of Transportation (Non-Medical): No   Housing Stability: Unknown (2023)    Received from [facility]    Housing Stability Vital Sign     Unable to Pay for Housing in the Last Year: No     Unstable Housing in the Last Year: No        Objective    No vital signs or physical exam due to telephone visit.   He is alert and oriented.     Wt Readings from Last 5 Encounters:   24 99 kg (218 lb 4.8 oz)   24 99 kg (218 lb 4.1 oz)   24 101 kg  "(222 lb)   05/01/24 92.2 kg (203 lb 4.2 oz)   02/14/24 93.4 kg (206 lb)     Allergies  Allergies   Allergen Reactions    Ciprofloxacin Itching    Ketorolac Rash    Latex Itching    Penicillins Other     Skin sloughing      Medications  Current Outpatient Medications   Medication Instructions    acetaminophen (TYLENOL) 1,000 mg, Every 8 hours PRN    acyclovir (ZOVIRAX) 800 mg, oral, Daily    Autolet lancing device Use as instructed    Blood glucose monitoring meter kit (OneTouch Ultra2 Meter) kit 1 each, miscellaneous, As needed, Check sugar every morning; dispense onetouch ultra meter    chlorhexidine (Peridex) 0.12 % solution 15 ml swish and spit for 30 seconds night prior to surgery and morning of surgery    cholecalciferol (Vitamin D-3) 50 MCG (2000 UT) tablet 2 tablets, 2 times daily    cloNIDine (CATAPRES) 0.3 mg, oral, 2 times daily    cyanocobalamin (VITAMIN B-12) 1,000 mcg, Daily RT    dapagliflozin propanediol (FARXIGA) 10 mg, oral, Daily    hydrOXYzine HCL (ATARAX) 25 mg, oral, 3 times daily    insulin syr/ndl U100 half mario 0.5 mL 31 gauge x 5/16\" syringe With meals    lancets (OneTouch Delica Plus Lancet) 33 gauge misc Check sugar daily    latanoprost (Xalatan) 0.005 % ophthalmic solution 1 drop, Left Eye, Nightly    lidocaine (Lidoderm) 5 % patch 1 patch, transdermal, Daily, Apply to painful area 12 hours per day, remove for 12 hours.    lisinopril 40 mg, oral, Daily    magnesium oxide (MAG-OX) 400 mg, Daily    melatonin 3 mg tablet 1 tablet, Nightly    montelukast (SINGULAIR) 10 mg, oral, Daily    Mounjaro 7.5 mg, subcutaneous, Once Weekly    OneTouch Ultra Test strip Check sugar every morning    oxybutynin XL (Ditropan-XL) 10 mg 24 hr tablet TAKE 1 TABLET EVERY DAY    pantoprazole (PROTONIX) 20 mg, oral, Daily    rosuvastatin (CRESTOR) 10 mg, oral, Daily    tiZANidine (ZANAFLEX) 4 mg, oral, 3 times daily    walker (Ultra-Light Rollator) misc Use for ambulation        Diagnostic Results   RESULTS "     Results for orders placed or performed in visit on 11/21/24 (from the past 96 hours)   Urinalysis with Reflex Microscopic   Result Value Ref Range    Color, Urine Light-Yellow Light-Yellow, Yellow, Dark-Yellow    Appearance, Urine Clear Clear    Specific Gravity, Urine 1.015 1.005 - 1.035    pH, Urine 5.0 5.0, 5.5, 6.0, 6.5, 7.0, 7.5, 8.0    Protein, Urine NEGATIVE NEGATIVE, 10 (TRACE), 20 (TRACE) mg/dL    Glucose, Urine Normal Normal mg/dL    Blood, Urine NEGATIVE NEGATIVE    Ketones, Urine NEGATIVE NEGATIVE mg/dL    Bilirubin, Urine NEGATIVE NEGATIVE    Urobilinogen, Urine Normal Normal mg/dL    Nitrite, Urine NEGATIVE NEGATIVE    Leukocyte Esterase, Urine NEGATIVE NEGATIVE   Ferritin   Result Value Ref Range    Ferritin 150 20 - 300 ng/mL   CBC and Auto Differential   Result Value Ref Range    WBC 5.8 4.4 - 11.3 x10*3/uL    nRBC 0.0 0.0 - 0.0 /100 WBCs    RBC 4.07 (L) 4.50 - 5.90 x10*6/uL    Hemoglobin 13.7 13.5 - 17.5 g/dL    Hematocrit 39.1 (L) 41.0 - 52.0 %    MCV 96 80 - 100 fL    MCH 33.7 26.0 - 34.0 pg    MCHC 35.0 32.0 - 36.0 g/dL    RDW 12.3 11.5 - 14.5 %    Platelets 234 150 - 450 x10*3/uL    Neutrophils % 58.9 40.0 - 80.0 %    Immature Granulocytes %, Automated 0.3 0.0 - 0.9 %    Lymphocytes % 30.7 13.0 - 44.0 %    Monocytes % 6.2 2.0 - 10.0 %    Eosinophils % 3.6 0.0 - 6.0 %    Basophils % 0.3 0.0 - 2.0 %    Neutrophils Absolute 3.43 1.60 - 5.50 x10*3/uL    Immature Granulocytes Absolute, Automated 0.02 0.00 - 0.50 x10*3/uL    Lymphocytes Absolute 1.79 0.80 - 3.00 x10*3/uL    Monocytes Absolute 0.36 0.05 - 0.80 x10*3/uL    Eosinophils Absolute 0.21 0.00 - 0.40 x10*3/uL    Basophils Absolute 0.02 0.00 - 0.10 x10*3/uL   Comprehensive Metabolic Panel   Result Value Ref Range    Glucose 168 (H) 74 - 99 mg/dL    Sodium 141 136 - 145 mmol/L    Potassium 3.9 3.5 - 5.3 mmol/L    Chloride 103 98 - 107 mmol/L    Bicarbonate 24 21 - 32 mmol/L    Anion Gap 18 10 - 20 mmol/L    Urea Nitrogen 22 6 - 23 mg/dL     Creatinine 1.01 0.50 - 1.30 mg/dL    eGFR 78 >60 mL/min/1.73m*2    Calcium 9.5 8.6 - 10.6 mg/dL    Albumin 4.4 3.4 - 5.0 g/dL    Alkaline Phosphatase 83 33 - 136 U/L    Total Protein 7.1 6.4 - 8.2 g/dL    AST 20 9 - 39 U/L    Bilirubin, Total 1.3 (H) 0.0 - 1.2 mg/dL    ALT 19 10 - 52 U/L   Iron and TIBC   Result Value Ref Range    Iron 90 35 - 150 ug/dL    UIBC 238 110 - 370 ug/dL    TIBC 328 240 - 445 ug/dL    % Saturation 27 25 - 45 %   Vitamin B12   Result Value Ref Range    Vitamin B12 820 211 - 911 pg/mL     *Note: Due to a large number of results and/or encounters for the requested time period, some results have not been displayed. A complete set of results can be found in Results Review.       Lab Results   Component Value Date    WBC 5.8 11/21/2024    NEUTROABS 3.43 11/21/2024    IGABSOL 0.02 11/21/2024    LYMPHSABS 1.79 11/21/2024    MONOSABS 0.36 11/21/2024    EOSABS 0.21 11/21/2024    BASOSABS 0.02 11/21/2024    RBC 4.07 (L) 11/21/2024    MCV 96 11/21/2024    MCHC 35.0 11/21/2024    HGB 13.7 11/21/2024    HCT 39.1 (L) 11/21/2024     11/21/2024     Lab Results   Component Value Date    RETICCTPCT 1.8 05/07/2021      Lab Results   Component Value Date    CREATININE 1.01 11/21/2024    BUN 22 11/21/2024    EGFR 78 11/21/2024     11/21/2024    K 3.9 11/21/2024     11/21/2024    CO2 24 11/21/2024      Lab Results   Component Value Date    ALT 19 11/21/2024    AST 20 11/21/2024    ALKPHOS 83 11/21/2024    BILITOT 1.3 (H) 11/21/2024      Lab Results   Component Value Date    TSH 2.09 09/08/2023     Lab Results   Component Value Date    TSH 2.09 09/08/2023     Lab Results   Component Value Date    IRON 90 11/21/2024    TIBC 328 11/21/2024    FERRITIN 150 11/21/2024      Lab Results   Component Value Date    LABZFCCE20 820 11/21/2024      Lab Results   Component Value Date    FOLATE 9.2 02/09/2022     Lab Results   Component Value Date    SEDRATE 10 04/27/2021      Lab Results   Component Value  "Date    CRP 0.15 12/22/2020      No results found for: \"JE\"  No results found for: \"LDH\"  Lab Results   Component Value Date    HAPTOGLOBIN 107 05/07/2021     Lab Results   Component Value Date    SPEP NORMAL 05/07/2021     Lab Results   Component Value Date     05/07/2021     05/07/2021     05/07/2021     No results found for: \"HEPATOT\", \"HEPAIGM\", \"HEPBCIGM\", \"HEPBCAB\", \"HEPBSAG\", \"HEPCAB\"  No results found for: \"HIV1X2\"    Recent Imaging     Assessment/Plan   ASSESSMENT/PLAN     Elliot Larson is a 74 y.o. male with a history of hypertension, glaucoma left eye, type 2 diabetes, recurrent UTIs, gastric bypass and BPH who presents for follow-up for anemia.    1. Macrocytic anemia  2. History of gastric bypass.      Pt has had some component of anemia since at least every 2018.  Discussed macrocytic anemia is often associated with folate and vitamin B12 deficiency. Started on vitamin B12 last visit and has received 6 1000mcg shots and one today.      - Most recent Hgb from 2/9/2022 while he was at the nursing facility was 8.9, likely related to post-op bleed from left knee replacement   - Hgb today is up to 12.5, however he feels significantly more fatigue and SOB, depressed mood, likely related to iron deficiency  - Will wait iron studies today with iron, TIBC, ferritin   - We will call him either today or tomorrow when those return if more IV iron is needed. If so, will plan to administer Venofer x3 and follow-up in 3 months      2/20/23:   - Hemoglobin at goal today.   - No evidence of new cytopenias.   - Discussed that he could follow with his PCP or us and he elected to follow with us. We will continue to follow for anemia.   - RTC in 6 months with labs day of.      11/14/2023:   - WBC 4.6, hgb 13.9, plt 153,000, , iron 130, ferritin 43, iron saturation 36%, vitamin B12 251  - Not taking oral B12 supplements, will increase through diet for now  - Not on oral tab supplementation " and with normal hgb and iron panel he will continue with a well balanced diet  - He is feeling well and prefers to return in 3 months for repeat labs and FUV (CBCd, CMP, vitamin b12, iron panel/ferritin)  - He will call if any changes or concerns in the meantime      5/1/2024:  - Presents for follow up to review labs   - WBC 5.0, hgb 14.4, , plt 206,000, vitamin B12 546, iron saturation 41%, ferritin 78, creatinine 0.95  - Reports feeling well with no new health changes and denies concerns   - Encouraged him to let his GI physician know if his diarrhea/constipation continues   - He was given a copy of his lab work   - Reports taking vitamin B12 sporadically, I encouraged him to continue since his number is stable   - He does not endorse signs of bleeding and is not on oral iron   - Patient prefers 6 month FUV but was encouraged to call in the meantime for new symptoms and we can check his lab work early   - RTC 6 months with labs a few days early     11/22/2024:  - Patient presents for follow up visit   - Labs from 11/21/24 show WBC 5.8, hgb 13.7, MCV 96, plt 234,000, vitamin B12 820, iron saturation 27%, ferritin 150, bilirubin 1.3, creatinine 1.01   - Will recheck bilirubin with next set of labs, although seeing primary care soon who may obtain labs   - Does not require iron replacement   - He will continue vitamin B12 orally   - We will ask our SW to touch base with him due to being tearful on the phone, he denies depression/anxiety, says he feels pissed and overwhelmed   - Advised patient to discuss ongoing chronic pain and mobility issues with primary care, reports he has had tried PT in the past but it was more painful than helpful   - He will RTC in 6 months with repeat cbc-d, iron panel and vitamin B12 with ferritin with a visit  with me      3. Pancreatic lesion  - Followed by Dr. Arevalo  - Patient underwent MRI in 11/2020 and no pancreatic lesions seen  - MRCP 12/2023 showed stable pancreas per   "Irina     4. CT lung nodules  - Most recent CT on 4/18/24 showed \"no suspicious nodules\" per Dr. Corbett      5. BPH/UTI/prostatitis   - Follows with urology     Potential diagnoses, treatment options, and plan of care discussed with patient. Patient verbalizes understanding of above plan. Time provided for patient's questions. All questions answered to patient's satisfaction in office. Patient instructed to reach out for any new concerning issues at [phone].      Manny Chandra, APRN-CNP     "

## 2024-11-25 ENCOUNTER — SOCIAL WORK (OUTPATIENT)
Dept: HEMATOLOGY/ONCOLOGY | Facility: CLINIC | Age: 74
End: 2024-11-25
Payer: MEDICARE

## 2024-11-25 DIAGNOSIS — Z12.11 COLON CANCER SCREENING: Primary | ICD-10-CM

## 2024-11-25 RX ORDER — SODIUM, POTASSIUM,MAG SULFATES 17.5-3.13G
SOLUTION, RECONSTITUTED, ORAL ORAL
Qty: 354 ML | Refills: 0 | Status: SHIPPED | OUTPATIENT
Start: 2024-11-25

## 2024-11-25 NOTE — PROGRESS NOTES
"The patient is a 74 year old followed at [facility] for anemia. He had a phone visit with Manny Chandra CNP on Friday and expressed frustration regarding his situation. Manny asked me to follow-up with him to determine if we can assist him. I reached out to the patient by phone today. He was very pleasant and easily engaged in conversation. He expressed appreciation for the call and denied that there was anything I could do to help him. He mentioned ongoing pain that was also shared with Manny. 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 He mentioned having depression for years and medication was never really helpful. He shared that he has a few friends he can rely on; others moved away or are now . He shared sources of fabio from his dog as well as visits to the library and grocery store when he is able to get out. He mentioned utilizing the delivery service from the library in between visits. He is planning to follow-up with his PCP next week. He again denied needs at this time and expressed appreciation for the call. Social work will remain available to assist this patient.    Sol Dumont, CHRISTIANA, JUNE   "

## 2024-12-03 ENCOUNTER — TELEPHONE (OUTPATIENT)
Dept: PRIMARY CARE | Facility: CLINIC | Age: 74
End: 2024-12-03

## 2024-12-03 ENCOUNTER — APPOINTMENT (OUTPATIENT)
Dept: PRIMARY CARE | Facility: CLINIC | Age: 74
End: 2024-12-03
Payer: MEDICARE

## 2024-12-03 NOTE — TELEPHONE ENCOUNTER
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 left a voicemail message stating need prescriptions refilled for all his medications they've all , it's too many to leave a voicemail message please review and refill them need a 10 days supplies sent to local pharmacy and need the others sent to [facility]. Pt. also said  call him hopefully this message is clear. Pt. contact number is [phone].

## 2024-12-04 DIAGNOSIS — I10 HYPERTENSION, UNSPECIFIED TYPE: ICD-10-CM

## 2024-12-04 DIAGNOSIS — K21.9 GASTROESOPHAGEAL REFLUX DISEASE WITHOUT ESOPHAGITIS: ICD-10-CM

## 2024-12-04 DIAGNOSIS — N32.81 OVERACTIVE BLADDER: ICD-10-CM

## 2024-12-04 DIAGNOSIS — J45.909 ASTHMA, UNSPECIFIED ASTHMA SEVERITY, UNSPECIFIED WHETHER COMPLICATED, UNSPECIFIED WHETHER PERSISTENT (HHS-HCC): ICD-10-CM

## 2024-12-04 DIAGNOSIS — E78.5 DYSLIPIDEMIA: ICD-10-CM

## 2024-12-04 RX ORDER — OXYBUTYNIN CHLORIDE 10 MG/1
TABLET, EXTENDED RELEASE ORAL
Qty: 10 TABLET | Refills: 0 | Status: SHIPPED | OUTPATIENT
Start: 2024-12-04

## 2024-12-05 ENCOUNTER — APPOINTMENT (OUTPATIENT)
Dept: PRIMARY CARE | Facility: CLINIC | Age: 74
End: 2024-12-05
Payer: MEDICARE

## 2024-12-05 DIAGNOSIS — Z12.11 COLON CANCER SCREENING: Primary | ICD-10-CM

## 2024-12-05 RX ORDER — POLYETHYLENE GLYCOL 3350, SODIUM SULFATE ANHYDROUS, SODIUM BICARBONATE, SODIUM CHLORIDE, POTASSIUM CHLORIDE 236; 22.74; 6.74; 5.86; 2.97 G/4L; G/4L; G/4L; G/4L; G/4L
4 POWDER, FOR SOLUTION ORAL ONCE
Qty: 4000 ML | Refills: 0 | Status: SHIPPED | OUTPATIENT
Start: 2024-12-05 | End: 2024-12-05

## 2024-12-05 NOTE — TELEPHONE ENCOUNTER
Patient called stating that he is unable to pay $75 for Suprep, and requested another prep be sent in.  Pended [facility] for Dr. Foster's approval and instructions will be sent to patient via the mail.

## 2024-12-06 DIAGNOSIS — M51.369 DISC DEGENERATION, LUMBAR: ICD-10-CM

## 2024-12-06 DIAGNOSIS — J45.909 ASTHMA, UNSPECIFIED ASTHMA SEVERITY, UNSPECIFIED WHETHER COMPLICATED, UNSPECIFIED WHETHER PERSISTENT (HHS-HCC): ICD-10-CM

## 2024-12-06 DIAGNOSIS — Z00.00 HEALTHCARE MAINTENANCE: ICD-10-CM

## 2024-12-06 RX ORDER — MONTELUKAST SODIUM 10 MG/1
10 TABLET ORAL DAILY
Qty: 90 TABLET | Refills: 0 | Status: SHIPPED | OUTPATIENT
Start: 2024-12-06

## 2024-12-06 RX ORDER — ACYCLOVIR 800 MG/1
800 TABLET ORAL DAILY
Qty: 90 TABLET | Refills: 0 | Status: SHIPPED | OUTPATIENT
Start: 2024-12-06

## 2024-12-06 RX ORDER — TIZANIDINE 4 MG/1
4 TABLET ORAL 3 TIMES DAILY
Qty: 270 TABLET | Refills: 0 | Status: SHIPPED | OUTPATIENT
Start: 2024-12-06

## 2024-12-09 ENCOUNTER — APPOINTMENT (OUTPATIENT)
Dept: PRIMARY CARE | Facility: CLINIC | Age: 74
End: 2024-12-09
Payer: MEDICARE

## 2024-12-16 DIAGNOSIS — E78.5 DYSLIPIDEMIA: ICD-10-CM

## 2024-12-17 RX ORDER — ROSUVASTATIN CALCIUM 10 MG/1
10 TABLET, COATED ORAL DAILY
Qty: 90 TABLET | Refills: 3 | Status: SHIPPED | OUTPATIENT
Start: 2024-12-17

## 2024-12-27 DIAGNOSIS — H40.052 OCULAR HYPERTENSION OF LEFT EYE: ICD-10-CM

## 2024-12-27 DIAGNOSIS — I10 HYPERTENSION, UNSPECIFIED TYPE: ICD-10-CM

## 2024-12-28 RX ORDER — LISINOPRIL 40 MG/1
40 TABLET ORAL DAILY
Qty: 30 TABLET | Refills: 0 | Status: SHIPPED | OUTPATIENT
Start: 2024-12-28

## 2025-01-02 DIAGNOSIS — N32.81 OVERACTIVE BLADDER: ICD-10-CM

## 2025-01-02 RX ORDER — LATANOPROST 50 UG/ML
1 SOLUTION/ DROPS OPHTHALMIC NIGHTLY
Qty: 7.5 ML | Refills: 3 | Status: SHIPPED | OUTPATIENT
Start: 2025-01-02 | End: 2026-01-02

## 2025-01-03 DIAGNOSIS — N32.81 OVERACTIVE BLADDER: ICD-10-CM

## 2025-01-03 RX ORDER — OXYBUTYNIN CHLORIDE 10 MG/1
TABLET, EXTENDED RELEASE ORAL
Qty: 30 TABLET | Refills: 3 | Status: CANCELLED | OUTPATIENT
Start: 2025-01-03

## 2025-01-03 RX ORDER — OXYBUTYNIN CHLORIDE 10 MG/1
TABLET, EXTENDED RELEASE ORAL
Qty: 30 TABLET | Refills: 3 | Status: SHIPPED | OUTPATIENT
Start: 2025-01-03

## 2025-01-08 DIAGNOSIS — N32.81 OVERACTIVE BLADDER: ICD-10-CM

## 2025-01-08 RX ORDER — OXYBUTYNIN CHLORIDE 10 MG/1
TABLET, EXTENDED RELEASE ORAL
Qty: 30 TABLET | Refills: 3 | Status: SHIPPED | OUTPATIENT
Start: 2025-01-08

## 2025-01-10 ENCOUNTER — PREP FOR PROCEDURE (OUTPATIENT)
Dept: UROLOGY | Facility: HOSPITAL | Age: 75
End: 2025-01-10
Payer: MEDICARE

## 2025-01-10 DIAGNOSIS — R79.1 ABNORMAL COAGULATION PROFILE: ICD-10-CM

## 2025-01-10 DIAGNOSIS — N39.41 URGE INCONTINENCE: Primary | ICD-10-CM

## 2025-01-10 RX ORDER — CHLORHEXIDINE GLUCONATE 40 MG/ML
SOLUTION TOPICAL DAILY PRN
OUTPATIENT
Start: 2025-01-10

## 2025-01-10 RX ORDER — VANCOMYCIN HYDROCHLORIDE 1 G/200ML
1000 INJECTION, SOLUTION INTRAVENOUS ONCE
OUTPATIENT
Start: 2025-01-10 | End: 2025-01-10

## 2025-01-14 LAB
NON-UH HIE C-REACTIVE PROTEIN, QUANTITATIVE: <0.5 MG/DL (ref 0–0.5)
NON-UH HIE SED RATE WESTERGREN: 15 MM/HR (ref 0–20)

## 2025-01-25 DIAGNOSIS — I10 HYPERTENSION, UNSPECIFIED TYPE: ICD-10-CM

## 2025-01-28 ENCOUNTER — HOSPITAL ENCOUNTER (OUTPATIENT)
Dept: RADIOLOGY | Facility: CLINIC | Age: 75
Discharge: HOME | End: 2025-01-28
Payer: MEDICARE

## 2025-01-28 DIAGNOSIS — T84.033A MECHANICAL LOOSENING OF INTERNAL LEFT KNEE PROSTHETIC JOINT, INITIAL ENCOUNTER (CMS-HCC): ICD-10-CM

## 2025-01-28 DIAGNOSIS — T84.84XA PAIN DUE TO INTERNAL ORTHOPEDIC PROSTHETIC DEVICES, IMPLANTS AND GRAFTS, INITIAL ENCOUNTER (CMS-HCC): ICD-10-CM

## 2025-01-28 PROCEDURE — 73700 CT LOWER EXTREMITY W/O DYE: CPT | Mod: LEFT SIDE | Performed by: RADIOLOGY

## 2025-01-28 PROCEDURE — 73700 CT LOWER EXTREMITY W/O DYE: CPT | Mod: LT

## 2025-01-28 RX ORDER — LISINOPRIL 40 MG/1
40 TABLET ORAL DAILY
Qty: 15 TABLET | Refills: 0 | Status: SHIPPED | OUTPATIENT
Start: 2025-01-28

## 2025-01-29 ENCOUNTER — APPOINTMENT (OUTPATIENT)
Dept: GASTROENTEROLOGY | Facility: CLINIC | Age: 75
End: 2025-01-29
Payer: MEDICARE

## 2025-01-31 ENCOUNTER — APPOINTMENT (OUTPATIENT)
Dept: PRIMARY CARE | Facility: CLINIC | Age: 75
End: 2025-01-31
Payer: MEDICARE

## 2025-02-06 ENCOUNTER — APPOINTMENT (OUTPATIENT)
Dept: PRIMARY CARE | Facility: CLINIC | Age: 75
End: 2025-02-06
Payer: MEDICARE

## 2025-02-06 ENCOUNTER — CLINICAL SUPPORT (OUTPATIENT)
Dept: PREADMISSION TESTING | Facility: HOSPITAL | Age: 75
End: 2025-02-06
Payer: MEDICARE

## 2025-02-06 VITALS
TEMPERATURE: 98.1 F | WEIGHT: 225 LBS | HEART RATE: 65 BPM | DIASTOLIC BLOOD PRESSURE: 65 MMHG | HEIGHT: 68 IN | OXYGEN SATURATION: 96 % | BODY MASS INDEX: 34.1 KG/M2 | SYSTOLIC BLOOD PRESSURE: 182 MMHG

## 2025-02-06 DIAGNOSIS — K21.9 GASTROESOPHAGEAL REFLUX DISEASE WITHOUT ESOPHAGITIS: ICD-10-CM

## 2025-02-06 DIAGNOSIS — N39.41 URGE INCONTINENCE OF URINE: ICD-10-CM

## 2025-02-06 DIAGNOSIS — E11.21 DIABETIC NEPHROPATHY ASSOCIATED WITH TYPE 2 DIABETES MELLITUS (MULTI): ICD-10-CM

## 2025-02-06 DIAGNOSIS — F32.89 OTHER DEPRESSION: ICD-10-CM

## 2025-02-06 DIAGNOSIS — I10 HYPERTENSION, UNSPECIFIED TYPE: Primary | ICD-10-CM

## 2025-02-06 DIAGNOSIS — N39.41 URGE INCONTINENCE: ICD-10-CM

## 2025-02-06 DIAGNOSIS — R94.31 ABNORMAL EKG: ICD-10-CM

## 2025-02-06 DIAGNOSIS — E11.9 DIABETES MELLITUS WITHOUT OPHTHALMIC MANIFESTATIONS (MULTI): ICD-10-CM

## 2025-02-06 DIAGNOSIS — Z12.11 COLON CANCER SCREENING: ICD-10-CM

## 2025-02-06 DIAGNOSIS — K86.9 LESION OF PANCREAS (HHS-HCC): ICD-10-CM

## 2025-02-06 DIAGNOSIS — R93.1 ELEVATED CORONARY ARTERY CALCIUM SCORE: ICD-10-CM

## 2025-02-06 DIAGNOSIS — E29.1 HYPOGONADISM MALE: ICD-10-CM

## 2025-02-06 DIAGNOSIS — E55.9 VITAMIN D DEFICIENCY: ICD-10-CM

## 2025-02-06 DIAGNOSIS — M50.10 HERNIATION OF CERVICAL INTERVERTEBRAL DISC WITH RADICULOPATHY: ICD-10-CM

## 2025-02-06 DIAGNOSIS — S72.009A: ICD-10-CM

## 2025-02-06 DIAGNOSIS — E78.5 DYSLIPIDEMIA: ICD-10-CM

## 2025-02-06 DIAGNOSIS — I73.9 PAD (PERIPHERAL ARTERY DISEASE) (CMS-HCC): ICD-10-CM

## 2025-02-06 DIAGNOSIS — J30.9 ALLERGIC RHINITIS, UNSPECIFIED SEASONALITY, UNSPECIFIED TRIGGER: ICD-10-CM

## 2025-02-06 DIAGNOSIS — J45.909 ASTHMA, UNSPECIFIED ASTHMA SEVERITY, UNSPECIFIED WHETHER COMPLICATED, UNSPECIFIED WHETHER PERSISTENT (HHS-HCC): ICD-10-CM

## 2025-02-06 LAB — HBA1C MFR BLD: 6.2 %

## 2025-02-06 PROCEDURE — 1036F TOBACCO NON-USER: CPT | Performed by: PEDIATRICS

## 2025-02-06 PROCEDURE — 3077F SYST BP >= 140 MM HG: CPT | Performed by: PEDIATRICS

## 2025-02-06 PROCEDURE — 3078F DIAST BP <80 MM HG: CPT | Performed by: PEDIATRICS

## 2025-02-06 PROCEDURE — 99214 OFFICE O/P EST MOD 30 MIN: CPT | Performed by: PEDIATRICS

## 2025-02-06 PROCEDURE — 83036 HEMOGLOBIN GLYCOSYLATED A1C: CPT | Performed by: PEDIATRICS

## 2025-02-06 PROCEDURE — G2211 COMPLEX E/M VISIT ADD ON: HCPCS | Performed by: PEDIATRICS

## 2025-02-06 PROCEDURE — 3008F BODY MASS INDEX DOCD: CPT | Performed by: PEDIATRICS

## 2025-02-06 PROCEDURE — 4010F ACE/ARB THERAPY RXD/TAKEN: CPT | Performed by: PEDIATRICS

## 2025-02-06 RX ORDER — DAPAGLIFLOZIN 10 MG/1
10 TABLET, FILM COATED ORAL DAILY
Qty: 90 TABLET | Refills: 0 | Status: SHIPPED | OUTPATIENT
Start: 2025-02-06 | End: 2026-03-13

## 2025-02-06 RX ORDER — ALBUTEROL SULFATE 90 UG/1
2 INHALANT RESPIRATORY (INHALATION) EVERY 4 HOURS PRN
Qty: 8.5 G | Refills: 0 | Status: SHIPPED | OUTPATIENT
Start: 2025-02-06 | End: 2025-02-06 | Stop reason: SDUPTHER

## 2025-02-06 RX ORDER — ALBUTEROL SULFATE 90 UG/1
2 INHALANT RESPIRATORY (INHALATION) EVERY 4 HOURS PRN
Qty: 8.5 G | Refills: 3 | Status: SHIPPED | OUTPATIENT
Start: 2025-02-06 | End: 2026-02-06

## 2025-02-06 NOTE — ASSESSMENT & PLAN NOTE
Patient reported sugars have been high at home. Last CMP glucose 163.   metformin HCl (METFORMIN ORAL) 4 tablet, Daily

## 2025-02-06 NOTE — PATIENT INSTRUCTIONS
Take Clonidine 0.3 twice a day  Get a BP monitor and message me next week with blood pressure  Patient assistance program Farxiga  Schedule medicare annual wellness in 3 months

## 2025-02-06 NOTE — CPM/PAT NURSE NOTE
"CPM/PAT Nurse Note      Name: Elliot SARITA Larson (Elliot Larson)  /Age: 1950/74 y.o.       Past Medical History:   Diagnosis Date    Adverse effect of anesthesia     After hip surgery \"took 3 days to come off breathing machine\". emergence deliruim- becomes very violent    Anemia     24 H/h  13.7/39.1 Follows with heme/onc Manny Chandra, APRN-CNP LOV 24    Asthma     last exacerbation in Dec with URI- alb neb used    Chronic bronchitis (Multi)     Delayed emergence from general anesthesia     Depression     Dyslipidemia     Elevated coronary artery calcium score     CT 24  272.59    Enlarged prostate with lower urinary tract symptoms (LUTS)     GERD (gastroesophageal reflux disease)     under control    History of cardiovascular stress test 2024    normal    History of central retinal vein occlusion (CMS-HCC)     right eye    Hypertension     Legally blind     right eye    Lung nodule     CT 24 No suspicious pulmonary nodules identified.    OAB (overactive bladder)     Osteoarthritis     Postlaminectomy syndrome, lumbar     Thoracic ascending aortic aneurysm (CMS-HCC)     CT 24 3.9 cm and unchanged from 2018    Type 2 diabetes mellitus     24 A1c 7.6    Urge incontinence     Urinary tract infection        Past Surgical History:   Procedure Laterality Date    CATARACT EXTRACTION Bilateral     CERVICAL LAMINECTOMY      COLONOSCOPY      GASTRIC BYPASS      Gastric Surgery For Morbid Obesity Gastric Bypass    HERNIA REPAIR Left     Inguinal Hernia Repair x 2    LUMBAR FUSION      Lumbar Vertebral Fusion    LUMBAR LAMINECTOMY      MR HEAD ANGIO WO IV CONTRAST  2021    MR HEAD ANGIO WO IV CONTRAST 2021 [facility]    ORIF HIP FRACTURE Left 2023    ORIF HIP PROXIMAL END, NECK, SLIDING HIP SCREW    OTHER SURGICAL HISTORY Left 2022    REVISION JOINT TOTAL KNEE FEMORAL AND ENTIRE TIBIAL COMPONENT    PENILE PROSTHESIS IMPLANT      SEPTOPLASTY  " "    TOTAL KNEE ARTHROPLASTY Bilateral     VITRECTOMY         Patient Sexual activity questions deferred to the physician.    Family History   Problem Relation Name Age of Onset    Other (BOWEL ISCHEMIA/INFARCTION) Mother      Atrial fibrillation Mother      Blood clot Mother      Coronary artery disease Father      Heart attack Father      No Known Problems Brother      Suicidality Maternal Grandfather         Allergies   Allergen Reactions    Ciprofloxacin Itching    Ketorolac Rash    Latex Itching    Penicillins Other     Skin sloughing       Prior to Admission medications    Medication Sig Start Date End Date Taking? Authorizing Provider   acetaminophen (Tylenol) 500 mg tablet Take 2 tablets (1,000 mg) by mouth every 8 hours if needed for moderate pain (4 - 6).    Historical Provider, MD   acyclovir (Zovirax) 800 mg tablet TAKE 1 TABLET EVERY DAY 12/6/24   Libra Corbett MD   Autolet lancing device Use as instructed 10/7/24 10/7/25  Libra Corbett MD   Blood glucose monitoring meter kit (OneTouch Ultra2 Meter) kit 1 each if needed (check sugar every morning;). Check sugar every morning; dispense onetouch ultra meter 10/7/24 10/7/25  Libra Corbett MD   chlorhexidine (Peridex) 0.12 % solution 15 ml swish and spit for 30 seconds night prior to surgery and morning of surgery 9/24/24   Madeline Mooney PA-C   cholecalciferol (Vitamin D-3) 50 MCG (2000 UT) tablet Take 2 tablets (4,000 Units) by mouth 2 times a day. 8/7/15   Historical Provider, MD   cloNIDine (Catapres) 0.3 mg tablet TAKE 1 TABLET TWICE DAILY  Patient taking differently: Take 1 tablet (0.3 mg) by mouth once daily. 8/29/24   Libra Corbett MD   cyanocobalamin (Vitamin B-12) 500 mcg tablet Take 1 tablet (500 mcg) by mouth once daily.    Historical Provider, MD   hydrOXYzine HCL (Atarax) 25 mg tablet Take 1 tablet (25 mg) by mouth 3 times a day. 2/15/24 11/21/24  Libra Corbett MD   insulin syr/ndl U100 half mario 0.5 mL 31 gauge x 5/16\" syringe With meals   "  Historical Provider, MD   lancets (OneTouch Delica Plus Lancet) 33 gauge misc Check sugar daily 10/7/24   Libra Corbett MD   latanoprost (Xalatan) 0.005 % ophthalmic solution Administer 1 drop into both eyes once daily at bedtime.  Patient taking differently: Administer 1 drop into the left eye once daily at bedtime. 1/2/25 1/2/26  Gloria Boone MD   lisinopril 40 mg tablet TAKE 1 TABLET EVERY DAY 1/28/25   Libra Corbett MD   magnesium oxide (Mag-Ox) 400 mg tablet Take 1 tablet (400 mg) by mouth once daily.    Historical Provider, MD   melatonin 3 mg tablet Take 1 tablet (3 mg) by mouth once daily at bedtime. 8/7/15   Historical Provider, MD   metformin HCl (METFORMIN ORAL) Take 4 tablets by mouth once daily. Unsure of mg    Historical Provider, MD   montelukast (Singulair) 10 mg tablet TAKE 1 TABLET EVERY DAY 12/6/24   Libra Corbett MD   OneTouch Ultra Test strip Check sugar every morning 10/7/24   Libra Corbett MD   oxybutynin XL (Ditropan-XL) 10 mg 24 hr tablet TAKE 1 TABLET EVERY DAY 1/8/25   Dick Gant MD   pantoprazole (ProtoNix) 20 mg EC tablet TAKE 1 TABLET EVERY DAY 5/25/24   Libra Corbett MD   rosuvastatin (Crestor) 10 mg tablet TAKE 1 TABLET EVERY DAY 12/17/24   Libra Corbett MD   sodium,potassium,mag sulfates (Suprep) 17.5-3.13-1.6 gram recon soln solution Take one bottle twice as directed by the prep instructions  Patient not taking: Reported on 2/6/2025 11/25/24   Mauri Foster MD PhD   tiZANidine (Zanaflex) 4 mg tablet TAKE 1 TABLET THREE TIMES DAILY 12/6/24   Libra Corbett MD   walker (Ultra-Light Rollator) misc Use for ambulation 11/27/23   Libra Corbett MD   dapagliflozin propanediol (Farxiga) 10 mg Take 1 tablet (10 mg) by mouth once daily.  Patient not taking: Reported on 11/21/2024 9/7/24 2/6/25  Libra Corbett MD   lidocaine (Lidoderm) 5 % patch Place 1 patch over 12 hours on the skin once daily. Apply to painful area 12 hours per day, remove for 12 hours.  Patient not taking:  Reported on 11/21/2024 9/4/24 2/6/25  Libra Corbett MD   tirzepatide (Mounjaro) 7.5 mg/0.5 mL pen injector Inject 7.5 mg under the skin 1 (one) time per week.  Patient not taking: Reported on 11/21/2024 9/8/24 2/6/25  Libra Corbett MD        PAT ROS     DASI Risk Score    No data to display       Caprini DVT Assessment    No data to display       Modified Frailty Index    No data to display       CHADS2 Stroke Risk  Current as of yesterday        N/A 3 to 100%: High Risk   2 to < 3%: Medium Risk   0 to < 2%: Low Risk     Last Change: N/A          This score determines the patient's risk of having a stroke if the patient has atrial fibrillation.        This score is not applicable to this patient. Components are not calculated.          Revised Cardiac Risk Index    No data to display       Apfel Simplified Score    No data to display       Risk Analysis Index Results This Encounter    No data found in the last 10 encounters.       Prodigy: High Risk  Total Score: 20              Prodigy Age Score      Prodigy Gender Score          ARISCAT Score for Postoperative Pulmonary Complications    No data to display       Abdullahi Perioperative Risk for Myocardial Infarction or Cardiac Arrest (RAZ)    No data to display         Nurse Plan of Action:   RN screening call complete.  Reviewed allergies, medications and pharmacy, medical, surgical and social history with patient.  Chart updated.  Instructed patient to stop vitamins 7 prior to surgery.       Dvora

## 2025-02-06 NOTE — PROGRESS NOTES
"Subjective   Patient ID: Elliot Larson is a 74 y.o. male who presents for medication follow-up.    HPI   Patient needs antifungal cream for athletes foot.  He is also concerned about his elevated sugars.    Broke proximal and distal ends of femur after a fall; will see ortho;    Colonoscopy due this year; prefers cologuard  Will see eye doctor  PSA normal Sept    Review of Systems    Objective   BP (!) 182/65   Pulse 65   Temp 36.7 °C (98.1 °F)   Ht 1.727 m (5' 8\")   Wt 102 kg (225 lb)   SpO2 96%   BMI 34.21 kg/m²     Physical Exam  Refuses foot doctor  Lungs clear  Heart RRR  Assessment/Plan   Problem List Items Addressed This Visit             ICD-10-CM    Abnormal EKG R94.31     Stress test and Echo May 2024 normal         Allergic rhinitis J30.9     Well controlled with avoidance of triggers         Asthma J45.909     Well controlled, Montelukast has helped. Worse with URI request albuterol as needed.         Relevant Medications    albuterol (ProAir HFA) 90 mcg/actuation inhaler    Depression F32.A     Went off duloxetine; declines meds and counseling; not suicidal         Dyslipidemia E78.5     Last lipid panel in Sept 2024 normal.  rosuvastatin (Crestor) 10 mg tablet          GERD (gastroesophageal reflux disease) K21.9    RESOLVED: Herniation of cervical intervertebral disc with radiculopathy M50.10    Hypertension - Primary I10     182/65 in office. Took BP meds the night prior.   lisinopril 40 mg tablet   cloNIDine (Catapres) 0.3 mg tablet   Patient had not been taking clonidine twice a day           Hypogonadism male E29.1     Dr Gant is aware         Diabetes mellitus without ophthalmic manifestations (Multi) E11.9     Patient reported sugars have been high at home. Last CMP glucose 163.   metformin HCl (METFORMIN ORAL) 4 tablet, Daily                                   Relevant Medications    dapagliflozin propanediol (Farxiga) 10 mg    Other Relevant Orders    Hemoglobin A1C    Lesion of " pancreas ([facility]-HCC) K86.9    Relevant Orders    MRCP pancreas w and wo IV contrast    PAD (peripheral artery disease) (CMS-HCC) I73.9    Urge incontinence of urine N39.41     Upcoming Surgery for bladder nerve stimulator, 02/12.          Vitamin D deficiency E55.9    Elevated coronary artery calcium score R93.1     Stress test and Echo May 2024 normal         Recent fracture of hip (Multi) S72.009A     Sees ortho, interested in total femur replacement w/ hip replacement         Diabetic nephropathy (Multi) E11.21     Other Visit Diagnoses         Codes    Colon cancer screening     Z12.11    Relevant Orders    Cologuard® colon cancer screening

## 2025-02-06 NOTE — ASSESSMENT & PLAN NOTE
182/65 in office. Took BP meds the night prior.   lisinopril 40 mg tablet   cloNIDine (Catapres) 0.3 mg tablet   Patient had not been taking clonidine twice a day

## 2025-02-10 ENCOUNTER — PRE-ADMISSION TESTING (OUTPATIENT)
Dept: PREADMISSION TESTING | Facility: HOSPITAL | Age: 75
End: 2025-02-10
Payer: MEDICARE

## 2025-02-10 ENCOUNTER — HOSPITAL ENCOUNTER (OUTPATIENT)
Dept: RADIOLOGY | Facility: HOSPITAL | Age: 75
Discharge: HOME | End: 2025-02-10
Payer: MEDICARE

## 2025-02-10 ENCOUNTER — TELEPHONE (OUTPATIENT)
Dept: PRIMARY CARE | Facility: CLINIC | Age: 75
End: 2025-02-10

## 2025-02-10 ENCOUNTER — HOSPITAL ENCOUNTER (OUTPATIENT)
Dept: CARDIOLOGY | Facility: HOSPITAL | Age: 75
Discharge: HOME | End: 2025-02-10
Payer: MEDICARE

## 2025-02-10 ENCOUNTER — APPOINTMENT (OUTPATIENT)
Dept: LAB | Facility: HOSPITAL | Age: 75
End: 2025-02-10
Payer: MEDICARE

## 2025-02-10 VITALS
OXYGEN SATURATION: 96 % | HEIGHT: 68 IN | DIASTOLIC BLOOD PRESSURE: 91 MMHG | RESPIRATION RATE: 18 BRPM | SYSTOLIC BLOOD PRESSURE: 176 MMHG | TEMPERATURE: 97.9 F | WEIGHT: 213.85 LBS | BODY MASS INDEX: 32.41 KG/M2 | HEART RATE: 89 BPM

## 2025-02-10 DIAGNOSIS — E11.9 DIABETES MELLITUS WITHOUT OPHTHALMIC MANIFESTATIONS (MULTI): Primary | ICD-10-CM

## 2025-02-10 DIAGNOSIS — Z01.818 PREPROCEDURAL EXAMINATION: ICD-10-CM

## 2025-02-10 DIAGNOSIS — K86.9 LESION OF PANCREAS (HHS-HCC): ICD-10-CM

## 2025-02-10 DIAGNOSIS — E11.9 DIABETES MELLITUS WITHOUT OPHTHALMIC MANIFESTATIONS (MULTI): ICD-10-CM

## 2025-02-10 DIAGNOSIS — B35.3 TINEA PEDIS OF BOTH FEET: Primary | ICD-10-CM

## 2025-02-10 DIAGNOSIS — R82.79 OTHER ABNORMAL FINDINGS ON MICROBIOLOGICAL EXAMINATION OF URINE: ICD-10-CM

## 2025-02-10 LAB
ANION GAP SERPL CALC-SCNC: 12 MMOL/L (ref 10–20)
BASOPHILS # BLD AUTO: 0.01 X10*3/UL (ref 0–0.1)
BASOPHILS NFR BLD AUTO: 0.2 %
BUN SERPL-MCNC: 18 MG/DL (ref 6–23)
CALCIUM SERPL-MCNC: 9.2 MG/DL (ref 8.6–10.3)
CHLORIDE SERPL-SCNC: 104 MMOL/L (ref 98–107)
CO2 SERPL-SCNC: 27 MMOL/L (ref 21–32)
CREAT SERPL-MCNC: 0.83 MG/DL (ref 0.5–1.3)
EGFRCR SERPLBLD CKD-EPI 2021: >90 ML/MIN/1.73M*2
EOSINOPHIL # BLD AUTO: 0.1 X10*3/UL (ref 0–0.4)
EOSINOPHIL NFR BLD AUTO: 2.2 %
ERYTHROCYTE [DISTWIDTH] IN BLOOD BY AUTOMATED COUNT: 12.2 % (ref 11.5–14.5)
GLUCOSE SERPL-MCNC: 178 MG/DL (ref 74–99)
HCT VFR BLD AUTO: 38.6 % (ref 41–52)
HGB BLD-MCNC: 13.8 G/DL (ref 13.5–17.5)
IMM GRANULOCYTES # BLD AUTO: 0.03 X10*3/UL (ref 0–0.5)
IMM GRANULOCYTES NFR BLD AUTO: 0.7 % (ref 0–0.9)
LYMPHOCYTES # BLD AUTO: 1.21 X10*3/UL (ref 0.8–3)
LYMPHOCYTES NFR BLD AUTO: 26.6 %
MCH RBC QN AUTO: 34 PG (ref 26–34)
MCHC RBC AUTO-ENTMCNC: 35.8 G/DL (ref 32–36)
MCV RBC AUTO: 95 FL (ref 80–100)
MONOCYTES # BLD AUTO: 0.3 X10*3/UL (ref 0.05–0.8)
MONOCYTES NFR BLD AUTO: 6.6 %
NEUTROPHILS # BLD AUTO: 2.9 X10*3/UL (ref 1.6–5.5)
NEUTROPHILS NFR BLD AUTO: 63.7 %
NRBC BLD-RTO: 0 /100 WBCS (ref 0–0)
PLATELET # BLD AUTO: 191 X10*3/UL (ref 150–450)
POTASSIUM SERPL-SCNC: 4.3 MMOL/L (ref 3.5–5.3)
RBC # BLD AUTO: 4.06 X10*6/UL (ref 4.5–5.9)
SODIUM SERPL-SCNC: 139 MMOL/L (ref 136–145)
WBC # BLD AUTO: 4.6 X10*3/UL (ref 4.4–11.3)

## 2025-02-10 PROCEDURE — 74183 MRI ABD W/O CNTR FLWD CNTR: CPT

## 2025-02-10 PROCEDURE — 93005 ELECTROCARDIOGRAM TRACING: CPT

## 2025-02-10 PROCEDURE — 74183 MRI ABD W/O CNTR FLWD CNTR: CPT | Performed by: STUDENT IN AN ORGANIZED HEALTH CARE EDUCATION/TRAINING PROGRAM

## 2025-02-10 PROCEDURE — 85025 COMPLETE CBC W/AUTO DIFF WBC: CPT

## 2025-02-10 PROCEDURE — A9575 INJ GADOTERATE MEGLUMI 0.1ML: HCPCS | Performed by: PEDIATRICS

## 2025-02-10 PROCEDURE — 2550000001 HC RX 255 CONTRASTS: Performed by: PEDIATRICS

## 2025-02-10 PROCEDURE — 93010 ELECTROCARDIOGRAM REPORT: CPT | Performed by: INTERNAL MEDICINE

## 2025-02-10 PROCEDURE — 80048 BASIC METABOLIC PNL TOTAL CA: CPT

## 2025-02-10 PROCEDURE — 99204 OFFICE O/P NEW MOD 45 MIN: CPT | Performed by: NURSE PRACTITIONER

## 2025-02-10 PROCEDURE — 87081 CULTURE SCREEN ONLY: CPT | Mod: AHULAB | Performed by: NURSE PRACTITIONER

## 2025-02-10 PROCEDURE — 76376 3D RENDER W/INTRP POSTPROCES: CPT | Performed by: STUDENT IN AN ORGANIZED HEALTH CARE EDUCATION/TRAINING PROGRAM

## 2025-02-10 RX ORDER — CHLORHEXIDINE GLUCONATE ORAL RINSE 1.2 MG/ML
15 SOLUTION DENTAL DAILY
Qty: 473 UNSPECIFIED | Refills: 0 | Status: SHIPPED | OUTPATIENT
Start: 2025-02-10 | End: 2025-02-13 | Stop reason: HOSPADM

## 2025-02-10 RX ORDER — GADOTERATE MEGLUMINE 376.9 MG/ML
19 INJECTION INTRAVENOUS
Status: COMPLETED | OUTPATIENT
Start: 2025-02-10 | End: 2025-02-10

## 2025-02-10 RX ORDER — CLOTRIMAZOLE 1 %
CREAM (GRAM) TOPICAL 2 TIMES DAILY
Qty: 30 G | Refills: 3 | Status: SHIPPED | OUTPATIENT
Start: 2025-02-10 | End: 2025-04-07

## 2025-02-10 RX ADMIN — GADOTERATE MEGLUMINE 19 ML: 376.9 INJECTION INTRAVENOUS at 18:13

## 2025-02-10 ASSESSMENT — DUKE ACTIVITY SCORE INDEX (DASI)
TOTAL_SCORE: 12.7
CAN YOU TAKE CARE OF YOURSELF (EAT, DRESS, BATHE, OR USE TOILET): YES
CAN YOU WALK INDOORS, SUCH AS AROUND YOUR HOUSE: YES
CAN YOU DO LIGHT WORK AROUND THE HOUSE LIKE DUSTING OR WASHING DISHES: YES
CAN YOU DO MODERATE WORK AROUND THE HOUSE LIKE VACUUMING, SWEEPING FLOORS OR CARRYING GROCERIES: NO
CAN YOU HAVE SEXUAL RELATIONS: NO
CAN YOU WALK A BLOCK OR TWO ON LEVEL GROUND: NO
CAN YOU PARTICIPATE IN MODERATE RECREATIONAL ACTIVITIES LIKE GOLF, BOWLING, DANCING, DOUBLES TENNIS OR THROWING A BASEBALL OR FOOTBALL: NO
DASI METS SCORE: 4.3
CAN YOU CLIMB A FLIGHT OF STAIRS OR WALK UP A HILL: YES
CAN YOU DO HEAVY WORK AROUND THE HOUSE LIKE SCRUBBING FLOORS OR LIFTING AND MOVING HEAVY FURNITURE: NO
CAN YOU PARTICIPATE IN STRENOUS SPORTS LIKE SWIMMING, SINGLES TENNIS, FOOTBALL, BASKETBALL, OR SKIING: NO
CAN YOU DO YARD WORK LIKE RAKING LEAVES, WEEDING OR PUSHING A MOWER: NO
CAN YOU RUN A SHORT DISTANCE: NO

## 2025-02-10 ASSESSMENT — ENCOUNTER SYMPTOMS
CARDIOVASCULAR NEGATIVE: 1
COUGH: 1
VOICE CHANGE: 1
GASTROINTESTINAL NEGATIVE: 1
CONSTITUTIONAL NEGATIVE: 1
ARTHRALGIAS: 1

## 2025-02-10 NOTE — CPM/PAT H&P
"CPM/PAT Evaluation       Name: Elliot Larson (Elliot Larson)  /Age: 1950/74 y.o.     SURGEON :DR NEETA CHATMAN   PROCEDURE AND DATE :  Insertion Continence Control Stimulator Sacral Lead ~ Stage 1 25    This a 74 y.o. male who presents for presurgical evaluation for above mentioned procedure. Pt with long standing urge incontinence . History of Urolift .   . After discussion of the risks and benefits with Dr. CHATMAN  the patient elects to proceed with the planned procedure.     Past Medical History:   Diagnosis Date    Adverse effect of anesthesia     After hip surgery \"took 3 days to come off breathing machine\". emergence deliruim- becomes very violent    Anemia     24 H/h  13.7/39.1 Follows with heme/onc Manny Chandra, APRN-CNP LOV 24    Asthma     last exacerbation in Dec with URI- alb neb used    Chronic bronchitis (Multi)     Delayed emergence from general anesthesia     Depression     Dyslipidemia     Elevated coronary artery calcium score     CT 24  272.59    Enlarged prostate with lower urinary tract symptoms (LUTS)     GERD (gastroesophageal reflux disease)     under control    History of cardiovascular stress test 2024    normal    History of central retinal vein occlusion (CMS-HCC)     right eye    Hypertension     Legally blind     right eye    Lung nodule     CT 24 No suspicious pulmonary nodules identified.    OAB (overactive bladder)     Osteoarthritis     Postlaminectomy syndrome, lumbar     Thoracic ascending aortic aneurysm (CMS-HCC)     CT 24 3.9 cm and unchanged from 2018    Type 2 diabetes mellitus     24 A1c 7.6    Urge incontinence     Urinary tract infection        Past Surgical History:   Procedure Laterality Date    CATARACT EXTRACTION Bilateral     CERVICAL LAMINECTOMY      COLONOSCOPY      GASTRIC BYPASS      Gastric Surgery For Morbid Obesity Gastric Bypass    HERNIA REPAIR Left     Inguinal Hernia Repair x 2    LUMBAR FUSION      " Lumbar Vertebral Fusion    LUMBAR LAMINECTOMY      MR HEAD ANGIO WO IV CONTRAST  2021    MR HEAD ANGIO WO IV CONTRAST 2021 [facility]    ORIF HIP FRACTURE Left 2023    ORIF HIP PROXIMAL END, NECK, SLIDING HIP SCREW    OTHER SURGICAL HISTORY Left 2022    REVISION JOINT TOTAL KNEE FEMORAL AND ENTIRE TIBIAL COMPONENT    PENILE PROSTHESIS IMPLANT      SEPTOPLASTY      TOTAL KNEE ARTHROPLASTY Bilateral     VITRECTOMY       Anesthesia History    POSSIBLE DIFFICULT INTUBATION     VIOLENT ON EMERGENCE   VERY SLOW TO EMERGE   PER PT :REQUEST NO INHALATION ANESTHESIA , CAUSES INABILITY TO BE REMOVED FROM VENTILATORY ASSISTANCE .(SEE A/P FOR FINDINGS OF EVENT)    Pt denies any past history of anesthetic complications such as PONV, awareness, prolonged sedation, dental damage, aspiration, cardiac arrest, difficult intubation, difficult I.V. access or unexpected hospital admissions.  NO malignant hyperthermia and or pseudo cholinesterase deficiency.    The patient IS NOT  a [employer] and WILL accept blood and blood products if medically indicated.   No history of blood transfusions .Type and screen not sent.     Social History  Social History     Substance and Sexual Activity   Drug Use Yes    Types: Marijuana    Comment: smokes daily, pipe    INSTRUCTED TO STOP DAY BEFORE SURGERY   Social History     Substance and Sexual Activity   Alcohol Use Yes    Alcohol/week: 14.0 standard drinks of alcohol    Types: 14 Glasses of wine per week    Comment: 2 glasses/day      Social History     Tobacco Use   Smoking Status Former    Current packs/day: 0.00    Average packs/day: 2.0 packs/day for 27.0 years (54.0 ttl pk-yrs)    Types: Cigarettes    Start date:     Quit date: 2006    Years since quittin.1    Passive exposure: Past   Smokeless Tobacco Never          Family History   Problem Relation Name Age of Onset    Other (BOWEL ISCHEMIA/INFARCTION) Mother      Atrial fibrillation Mother       Blood clot Mother      Coronary artery disease Father      Heart attack Father      No Known Problems Brother      Suicidality Maternal Grandfather         Allergies   Allergen Reactions    Ciprofloxacin Itching    Ketorolac Rash    Latex Itching    Penicillins Other     Skin sloughing       Prior to Admission medications    Medication Sig Start Date End Date Taking? Authorizing Provider   acetaminophen (Tylenol) 500 mg tablet Take 2 tablets (1,000 mg) by mouth every 8 hours if needed for moderate pain (4 - 6).   Yes Historical Provider, MD   acyclovir (Zovirax) 800 mg tablet TAKE 1 TABLET EVERY DAY 12/6/24  Yes Libra Corbett MD   cholecalciferol (Vitamin D-3) 50 MCG (2000 UT) tablet Take 2 tablets (4,000 Units) by mouth 2 times a day. 8/7/15  Yes Historical Provider, MD   cloNIDine (Catapres) 0.3 mg tablet TAKE 1 TABLET TWICE DAILY  Patient taking differently: Take 1 tablet (0.3 mg) by mouth once daily. 8/29/24  Yes Libra Corbett MD   cyanocobalamin (Vitamin B-12) 500 mcg tablet Take 1 tablet (500 mcg) by mouth once daily.   Yes Historical Provider, MD   latanoprost (Xalatan) 0.005 % ophthalmic solution Administer 1 drop into both eyes once daily at bedtime.  Patient taking differently: Administer 1 drop into the left eye once daily at bedtime. 1/2/25 1/2/26 Yes Gloria Boone MD   lisinopril 40 mg tablet TAKE 1 TABLET EVERY DAY 1/28/25  Yes Libra Corbett MD   magnesium oxide (Mag-Ox) 400 mg tablet Take 1 tablet (400 mg) by mouth once daily.   Yes Historical Provider, MD   metformin HCl (METFORMIN ORAL) Take 4 tablets by mouth once daily. Unsure of mg   Yes Historical Provider, MD   montelukast (Singulair) 10 mg tablet TAKE 1 TABLET EVERY DAY 12/6/24  Yes Libra Corbett MD   oxybutynin XL (Ditropan-XL) 10 mg 24 hr tablet TAKE 1 TABLET EVERY DAY 1/8/25  Yes Dick Gant MD   pantoprazole (ProtoNix) 20 mg EC tablet TAKE 1 TABLET EVERY DAY 5/25/24  Yes Libra Corbett MD   rosuvastatin (Crestor) 10 mg tablet TAKE 1  "TABLET EVERY DAY 12/17/24  Yes Libra Corbett MD   walker (Ultra-Light Rollator) misc Use for ambulation 11/27/23  Yes Libra Corbett MD   albuterol (ProAir HFA) 90 mcg/actuation inhaler Inhale 2 puffs every 4 hours if needed for wheezing or shortness of breath.  Patient not taking: Reported on 2/10/2025 2/6/25 2/6/26  Libra Corbett MD   Autolet lancing device Use as instructed  Patient not taking: Reported on 2/10/2025 10/7/24 10/7/25  Libra Corbett MD   Blood glucose monitoring meter kit (OneTouch Ultra2 Meter) kit 1 each if needed (check sugar every morning;). Check sugar every morning; dispense onetouch ultra meter  Patient not taking: Reported on 2/10/2025 10/7/24 10/7/25  Libra Corbett MD   chlorhexidine (Peridex) 0.12 % solution 15 ml swish and spit for 30 seconds night prior to surgery and morning of surgery 9/24/24   Madeline Mooney PA-C   chlorhexidine (Peridex) 0.12 % solution Use 15 mL in the mouth or throat once daily for 2 days. 2/10/25 2/12/25  ORALIA Crespo-CNP   dapagliflozin propanediol (Farxiga) 10 mg Take 1 tablet (10 mg) by mouth once daily.  Patient not taking: Reported on 2/10/2025 2/6/25 3/13/26  Libra Corbett MD   hydrOXYzine HCL (Atarax) 25 mg tablet Take 1 tablet (25 mg) by mouth 3 times a day.  Patient not taking: Reported on 2/10/2025 2/15/24 11/21/24  Libra Corbett MD   insulin syr/ndl U100 half mario 0.5 mL 31 gauge x 5/16\" syringe With meals  Patient not taking: Reported on 2/10/2025    Bradly Arias MD   lancets (OneTouch Delica Plus Lancet) 33 gauge misc Check sugar daily  Patient not taking: Reported on 2/10/2025 10/7/24   Libra Corbett MD   melatonin 3 mg tablet Take 1 tablet (3 mg) by mouth once daily at bedtime.  Patient not taking: Reported on 2/10/2025 8/7/15   Historical Provider, MD   OneTouch Ultra Test strip Check sugar every morning  Patient not taking: Reported on 2/10/2025 10/7/24   Libra Corbett MD   sodium,potassium,mag sulfates (Suprep) " 17.5-3.13-1.6 gram recon soln solution Take one bottle twice as directed by the prep instructions  Patient not taking: Reported on 2/10/2025 11/25/24   Mauri Foster MD PhD   tiZANidine (Zanaflex) 4 mg tablet TAKE 1 TABLET THREE TIMES DAILY  Patient not taking: Reported on 2/10/2025 12/6/24   Libra Corbett MD   albuterol (ProAir HFA) 90 mcg/actuation inhaler Inhale 2 puffs every 4 hours if needed for wheezing or shortness of breath. 2/6/25 2/6/25  Libra Corbett MD   dapagliflozin propanediol (Farxiga) 10 mg Take 1 tablet (10 mg) by mouth once daily.  Patient not taking: Reported on 11/21/2024 9/7/24 2/6/25  Libra Corbett MD   lidocaine (Lidoderm) 5 % patch Place 1 patch over 12 hours on the skin once daily. Apply to painful area 12 hours per day, remove for 12 hours.  Patient not taking: Reported on 11/21/2024 9/4/24 2/6/25  Libra Corbett MD   tirzepatide (Mounjaro) 7.5 mg/0.5 mL pen injector Inject 7.5 mg under the skin 1 (one) time per week.  Patient not taking: Reported on 2/6/2025 9/8/24 2/6/25  Libra Corbett MD        PAT ROS:   Constitutional:   neg    Neuro/Psych:   Eyes:   Ears:   Nose:   neg    Mouth:   Throat:    voice change  Neck:   Cardio:   neg    Respiratory:    cough  Endocrine:   GI:   neg    :    FREUENT URINATION   neg     Polyuria: ]]]]]}]]}.  Musculoskeletal:    arthralgias (LTY KNEE HIP)  Hematologic:   Skin:      Physical Exam  Vitals reviewed.   Constitutional:       Appearance: He is obese.   HENT:      Head: Normocephalic.      Mouth/Throat:      Mouth: Mucous membranes are dry.   Eyes:      Extraocular Movements: Extraocular movements intact.      Pupils: Pupils are equal, round, and reactive to light.   Cardiovascular:      Rate and Rhythm: Normal rate and regular rhythm.      Pulses: Normal pulses.      Heart sounds: Normal heart sounds.   Pulmonary:      Effort: Pulmonary effort is normal.      Breath sounds: Normal breath sounds.   Musculoskeletal:         General: Tenderness  "present.      Cervical back: Rigidity present.   Skin:     General: Skin is warm and dry.   Neurological:      Mental Status: He is alert and oriented to person, place, and time.   Psychiatric:         Behavior: Behavior normal.          PAT AIRWAY:   Airway:     Mallampati::  Unable to assess    Neck ROM::  Limited   PT UNABLE TO RAISE CHIN GREATER THAN 5 DEGREES, MOUTH OPEN 2 FB, TONGUE OBSCURES OROPHARYNX       Testing/Diagnostic:     EKG IN EPIC   .FINDINGS:  The score and distribution of calcium in the coronary arteries is as  follows:      LM 0,  .61,  LCx 21.04,  RCA 45.94,      Total 272.59      Lab Results   Component Value Date    WBC 4.6 02/10/2025    HGB 13.8 02/10/2025    HCT 38.6 (L) 02/10/2025    MCV 95 02/10/2025     02/10/2025     Lab Results   Component Value Date    GLUCOSE 178 (H) 02/10/2025    CALCIUM 9.2 02/10/2025     02/10/2025    K 4.3 02/10/2025    CO2 27 02/10/2025     02/10/2025    BUN 18 02/10/2025    CREATININE 0.83 02/10/2025     Lab Results   Component Value Date    HGBA1C 6.2 (H) 02/06/2025     .MRSA PENDING    Patient Specialist/PCP:     Visit Vitals  BP (!) 176/91   Pulse 89   Temp 36.6 °C (97.9 °F)   Resp 18   Ht 1.727 m (5' 8\")   Wt 97 kg (213 lb 13.5 oz)   SpO2 96%   BMI 32.52 kg/m²   Smoking Status Former   BSA 2.16 m²   REPEAT   ASSESSMENT/PLAN    Patient is a 74 year-old  scheduled for Insertion Continence Control Stimulator Sacral Lead ~ Stage 1  with Dr. CHATMAN   on  2/12/255 .  CARDIOVASCULAR:  RCRI score / Risk: The patients score is 1 based on history . Per ACC/AHA guidelines this places him  at  6.0% risk for MACE undergoing a low  risk procedure . The patient has the following risk factors:retinal vein occlusion   Functional Capacity: The patients exercise tolerance is  4  METS. This is based on the patient's walk with wAKER . Patient denies  active cardiac symptoms or anginal equivalents .      PULMONARY:  The patient has the following factors " "that place them at increased risk of perioperative pulmonary complications;age greater than 65/BMI greater than 27/ASTHMA/SMOKER/GERD decrease functional status /greater than 2.5 hour procedure.  Postoperatively the patient would benefit from early pulmonary toilet/incentive spirometry q 1-2 hours while awake/pulse oximetry/cautious use of respiratory depressant medications such as opioids/elevate the HOB/oral hygiene.    POST OPERATIVE INTUBATION /ADMISSION TO SICU:  Per pt he was on ventilatory lane for 3 days postoperative;ly from hip surgery . Review of past surgical postprocedure notes patient was admitted to SICU on 2/1/2022 for the following and extubated the next day  ;   \"He is admitted to the SICU postop.  Hid OR course was notable for  for which he received 2U PRBC and cell saver, need for levo.  He is admitted to the SICU inutbated on precedex and levophed infusions. Extubated this morning, off pressors. \".    BPH :  Pt is NOT  on (alpha 5- reductase inhibitors) Recommendations: , monitior for urinary retention, avoid troncoso catheter if able.    DM2:  The patient has WELL controlled diabetes.Currently the patient manages their diabetes with oral agents, his  recent A1C was 6.2% on2/6/25.  The morning of surgery, the patient  was told to hold regular insulin and hold oral antihyperglycemic medications.  The night before surgery the patient was instructed to administer  half their long acting insulin.        DVT:  CAPRINI SCORE=8  The patient has the following factors that increase his  Risk for thrombus formation ; Virchow's triad , age>70, bmi>30, Surgical procedure >2 hrs  procedure .    Recommendations: DVT prophylaxis  per Dr. Gant protocol . SCD's, SANJUANITA's, and early ambulation are recommended. Heparin or LMWH is recommended for the very high risk .      Risk assessment complete.  Patient is scheduled for  low  surgical risk procedure.  Patient is considered an acceptable  risk to proceed with " the planned procedure.      Preoperative medication instructions were provided and reviewed with the patient.  Any additional testing or evaluation was explained to the patient.  Nothing by mouth instructions were discussed and patient's questions were answered prior to conclusion to this encounter.  Patient verbalized understanding of preoperative instructions given in preadmission testing; discharge instructions available in EMR.

## 2025-02-10 NOTE — PREPROCEDURE INSTRUCTIONS
Medication List            Accurate as of February 10, 2025 12:34 PM. Always use your most recent med list.                acetaminophen 500 mg tablet  Commonly known as: Tylenol  Medication Adjustments for Surgery: Take/Use as prescribed     acyclovir 800 mg tablet  Commonly known as: Zovirax  TAKE 1 TABLET EVERY DAY  Medication Adjustments for Surgery: Take on the morning of surgery     albuterol 90 mcg/actuation inhaler  Commonly known as: ProAir HFA  Inhale 2 puffs every 4 hours if needed for wheezing or shortness of breath.  Medication Adjustments for Surgery: Take/Use as prescribed     Autolet lancing device  Use as instructed  Medication Adjustments for Surgery: Take/Use as prescribed     Blood glucose monitoring meter  Commonly known as: OneTouch Ultra2 Meter  1 each if needed (check sugar every morning;). Check sugar every morning; dispense onetouch ultra meter  Medication Adjustments for Surgery: Take/Use as prescribed     * chlorhexidine 0.12 % solution  Commonly known as: Peridex  15 ml swish and spit for 30 seconds night prior to surgery and morning of surgery  Medication Adjustments for Surgery: Take/Use as prescribed     * chlorhexidine 0.12 % solution  Commonly known as: Peridex  Use 15 mL in the mouth or throat once daily for 2 days.  Medication Adjustments for Surgery: Take/Use as prescribed     cholecalciferol 50 MCG (2000 UT) tablet  Commonly known as: Vitamin D-3  Medication Adjustments for Surgery: Do Not take on the morning of surgery     cloNIDine 0.3 mg tablet  Commonly known as: Catapres  TAKE 1 TABLET TWICE DAILY  Medication Adjustments for Surgery: Take on the morning of surgery     cyanocobalamin 500 mcg tablet  Commonly known as: Vitamin B-12  Medication Adjustments for Surgery: Do Not take on the morning of surgery     dapagliflozin propanediol 10 mg  Commonly known as: Farxiga  Take 1 tablet (10 mg) by mouth once daily.  Medication Adjustments for Surgery: Take last dose 3 days  "before surgery     hydrOXYzine HCL 25 mg tablet  Commonly known as: Atarax  Take 1 tablet (25 mg) by mouth 3 times a day.  Medication Adjustments for Surgery: Take on the morning of surgery     insulin syr/ndl U100 half mario 0.5 mL 31 gauge x 5/16\" syringe  Medication Adjustments for Surgery: Take/Use as prescribed     lancets 33 gauge misc  Commonly known as: OneTouch Delica Plus Lancet  Check sugar daily  Medication Adjustments for Surgery: Take/Use as prescribed     latanoprost 0.005 % ophthalmic solution  Commonly known as: Xalatan  Administer 1 drop into both eyes once daily at bedtime.  Medication Adjustments for Surgery: Take on the morning of surgery     lisinopril 40 mg tablet  TAKE 1 TABLET EVERY DAY  Medication Adjustments for Surgery: Take last dose 1 day (24 hours) before surgery     magnesium oxide 400 mg tablet  Commonly known as: Mag-Ox  Medication Adjustments for Surgery: Do Not take on the morning of surgery     melatonin 3 mg tablet  Medication Adjustments for Surgery: Take/Use as prescribed     METFORMIN ORAL  Medication Adjustments for Surgery: Take last dose 1 day (24 hours) before surgery     montelukast 10 mg tablet  Commonly known as: Singulair  TAKE 1 TABLET EVERY DAY  Medication Adjustments for Surgery: Do Not take on the morning of surgery     OneTouch Ultra Test strip  Generic drug: blood sugar diagnostic  Check sugar every morning  Medication Adjustments for Surgery: Take/Use as prescribed     oxybutynin XL 10 mg 24 hr tablet  Commonly known as: Ditropan-XL  TAKE 1 TABLET EVERY DAY  Medication Adjustments for Surgery: Do Not take on the morning of surgery     pantoprazole 20 mg EC tablet  Commonly known as: ProtoNix  TAKE 1 TABLET EVERY DAY  Medication Adjustments for Surgery: Take on the morning of surgery     rosuvastatin 10 mg tablet  Commonly known as: Crestor  TAKE 1 TABLET EVERY DAY  Medication Adjustments for Surgery: Take on the morning of surgery     sodium,potassium,mag " sulfates 17.5-3.13-1.6 gram solution  Commonly known as: Suprep  Take one bottle twice as directed by the prep instructions  Medication Adjustments for Surgery: Do Not take on the morning of surgery     tiZANidine 4 mg tablet  Commonly known as: Zanaflex  TAKE 1 TABLET THREE TIMES DAILY  Medication Adjustments for Surgery: Do Not take on the morning of surgery     Ultra-Light Rollator misc  Generic drug: walker  Use for ambulation  Medication Adjustments for Surgery: Take/Use as prescribed           * This list has 2 medication(s) that are the same as other medications prescribed for you. Read the directions carefully, and ask your doctor or other care provider to review them with you.                CONTACT SURGEON'S OFFICE IF YOU DEVELOP:  * Fever = 100.4 F   * New respiratory symptoms (e.g. cough, shortness of breath, respiratory distress, sore throat)  * Recent loss of taste or smell  *Flu like symptoms such as headache, fatigue or gastrointestinal symptoms  * You develop any open sores, shingles, burning or painful urination   AND/OR:  * You no longer wish to have the surgery.  * Any other personal circumstances change that may lead to the need to cancel or defer this surgery.  *You were admitted to any hospital within one week of your planned procedure.    SMOKING:  *Quitting smoking can make a huge difference to your health and recovery from surgery.    *If you need help with quitting, call 1-857-QUIT-NOW.    THE DAY BEFORE SURGERY:  *Do not eat any food after midnight the night before surgery.   DRINK 13.5 ounces of clear liquid until TWO hours before your instructed arrival time to the hospital  DIABETICS:  Please check fasting blood sugar  upon waking up.  If fasting sugar is <80 mg/dl, please drink 100ml/3oz of apple juice no later than 2 hours prior to surgery.      SURGICAL TIME  *You will be contacted between 2 p.m. and 6 p.m. the business day before your surgery with your arrival time.  *If you  haven't received a call by 6pm, call [phone].  *Scheduled surgery times may change and you will be notified if this occurs-check your personal voicemail for any updates.    ON THE MORNING OF SURGERY:  *Wear comfortable, loose fitting clothing.   *Do not use moisturizers, creams, lotions or perfume.  *All jewelry and valuables should be left at home.  *Prosthetic devices such as contact lenses, hearing aids, dentures, eyelash extensions, hairpins and body piercing must be removed before surgery.    BRING WITH YOU:  *Photo ID and insurance card  *Current list of medicines and allergies  *Pacemaker/Defibrillator/Heart stent cards  *CPAP machine and mask  *Slings/splints/crutches  *Copy of your complete Advanced Directive/DHPOA-if applicable  *Neurostimulator implant remote    PARKING AND ARRIVAL:  *Check in at the Main Entrance desk and let them know you are here for surgery.  *You will be directed to the 2nd floor surgical waiting area.    AFTER OUTPATIENT SURGERY:  *A responsible adult MUST accompany you at the time of discharge and stay with you for 24 hours after your surgery.  *You may NOT drive yourself home after surgery.  *You may use a taxi or ride sharing service ([employer], [employer]) to return home ONLY if you are accompanied by a friend or family member.  *Instructions for resuming your medications will be provided by your surgeon.    Home Preoperative Antibacterial Shower     What is a home preoperative antibacterial shower?  This shower is a way of cleaning the skin with a germ killing soap before surgery.  The soap contains chlorhexidine, commonly known as CHG.  CHG is a soap for your skin with germ killing ability.  Let your doctor know if you are allergic to chlorhexidine.    Why do I need to take a preoperative antibacterial shower?  Skin is not sterile.  It is best to try to make your skin as free of germs as possible before surgery.  Proper cleansing with a germ killing soap before surgery can lower the  number of germs on your skin.  This helps to reduce the risk of infection at the surgical site.  Following the instructions listed below will help you prepare your skin for surgery.      How do I use the CHG skin cleanser?  Steps:  Begin using your CHG soap five days before your scheduled surgery on ________________________.    Days 1-4 Shower before bed:  Wash your face and genitals with your normal soap and rinse.           2.    Apply the CHG soap to a clean wet washcloth.  Turn the water off or move away                From the water spray to avoid premature rinsing of the CHG soap as you are applying.     3.   Lather your entire body from the neck down.  Do not use on your face or genitals.  4. Pay special attention to the area(s) where your incision(s) will be located unless they are on your face.  Avoid scrubbing your skin too hard.  The important point is to have the CHG soap sit on your skin for 3 minutes.    When the 3 minutes are up, turn on the water and rinse the CHG soap off your body completely.   Pat yourself dry with a clean, freshly-laundered towel.  Dress in clean, freshly laundered night clothes.    Be sure to sleep with clean, freshly laundered sheets.  Day 5:  Last shower is the morning before surgery: Follow above Instructions.    NOTE:        *Keep CHG soap out of eyes and ear canals   *DO NOT wash with regular soap on your body after you have used the CHG soap solution  *DO NOT apply powders, lotions, or perfume.  *Deodorant may be used days 1-4, BUT NOT the day of surgery    Who should I contact if I have any questions regarding the use of CHG soap?  Call the [facility], Preadmission Testing at [phone] if you have any questions.              Patient Information: Pre-Operative Infection Prevention Measures     Why did I have my nose, under my arms and groin swabbed?  The purpose of the swab is to identify Staphylococcus aureus inside your nose or on your  skin.  The swab was sent to the laboratory for culture.  A positive swab/culture for Staphylococcus aureus is called colonization or carriage.      What is Staphylococcus aureus?  Staphylococcus aureus, also known as “staph”, is a germ found on the skin or in the nose of healthy people.  Sometimes Staphylococcus aureus can get into the body and cause an infection.  This can be minor (such as pimples, boils or other skin problems).  It might also be serious (such as blood infection, pneumonia or a surgical site infection).    What is Staphylococcus aureus colonization or carriage?  Colonization or carriage means that a person has the germ but is not sick from it.  These bacteria can be spread on the hands or when breathing or sneezing.    How is Staphylococcus aureus spread?  It is most often spread by close contact with a person or item that carries it.    What happens if my culture is positive for Staphylococcus aureus?  Your doctor/medical team will use this information to guide any antibiotic treatment which may be necessary.  Regardless of the culture results, we will clean the inside of your nose with a betadine swab just before you have your surgery.      Will I get an infection if I have Staphylococcus aureus in my nose or on my skin?  Anyone can get an infection with Staphylococcus aureus.  However, the best way to reduce your risk of infection is to follow the instructions provided to you for the use of your CHG soap and dental rinse.        Who should I contact if I have any questions?  Call the [facility], Preadmission Testing at [phone] if you have any questions.           Patient Information: Oral/Dental Rinse  **This is a prescription; pick it up at your preferred local pharmacy **  What is oral/dental rinse?   It is a mouthwash. It is a way of cleaning the mouth with a germ killing solution before your surgery.  The solution contains chlorhexidine, commonly known as  CHG.   It is used inside the mouth to kill a bacteria known as Staphylococcus aureus.  Let your doctor know if you are allergic to Chlorhexidine.    Why do I need to use CHG oral/dental rinse?  The CHG oral/dental rinse helps to kill a bacteria in your mouth known a Staphylococcus aureus.     This reduces the risk of infection at the surgical site.      Using your CHG oral/dental rinse  STEPS:  Use your CHG oral/dental rinse after you brush your teeth the night before (at bedtime) and the morning of your surgery.  Follow all directions on your prescription label.    Use the cap on the container to measure 15ml (fill cap to fill line)  Swish (gargle if you can) the mouthwash in your mouth for at least 30 seconds, (do not to swallow) spit out  After you use your CHG rinse, do not rinse your mouth with water, drink or eat.  Please refer to prescription label for the appropriate time to resume oral intake  Dental rinse comes in one size bottle: 473ml ~16oz.  You will have leftover    rinse, discard after this use.    What side effects might I have using the CHG oral/dental rinse?  CHG rinse will stick to plaque on the teeth.  Brush and floss just before use.  Teeth brushing will help avoid staining of plaque during use.    Who should I contact if I have questions about the CHG oral/dental rinse?  Please call [facility], Preadmission Testing at [phone] if you have any questions

## 2025-02-10 NOTE — TELEPHONE ENCOUNTER
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 left a voicemail message calling regarding a cream for athlete's foot call him back at [phone].

## 2025-02-10 NOTE — H&P (VIEW-ONLY)
"CPM/PAT Evaluation       Name: Elliot Larson (Elliot Larson)  /Age: 1950/74 y.o.     SURGEON :DR NEETA CHATMAN   PROCEDURE AND DATE :  Insertion Continence Control Stimulator Sacral Lead ~ Stage 1 25    This a 74 y.o. male who presents for presurgical evaluation for above mentioned procedure. Pt with long standing urge incontinence . History of Urolift .   . After discussion of the risks and benefits with Dr. CHATMAN  the patient elects to proceed with the planned procedure.     Past Medical History:   Diagnosis Date    Adverse effect of anesthesia     After hip surgery \"took 3 days to come off breathing machine\". emergence deliruim- becomes very violent    Anemia     24 H/h  13.7/39.1 Follows with heme/onc Manny Chandra, APRN-CNP LOV 24    Asthma     last exacerbation in Dec with URI- alb neb used    Chronic bronchitis (Multi)     Delayed emergence from general anesthesia     Depression     Dyslipidemia     Elevated coronary artery calcium score     CT 24  272.59    Enlarged prostate with lower urinary tract symptoms (LUTS)     GERD (gastroesophageal reflux disease)     under control    History of cardiovascular stress test 2024    normal    History of central retinal vein occlusion (CMS-HCC)     right eye    Hypertension     Legally blind     right eye    Lung nodule     CT 24 No suspicious pulmonary nodules identified.    OAB (overactive bladder)     Osteoarthritis     Postlaminectomy syndrome, lumbar     Thoracic ascending aortic aneurysm (CMS-HCC)     CT 24 3.9 cm and unchanged from 2018    Type 2 diabetes mellitus     24 A1c 7.6    Urge incontinence     Urinary tract infection        Past Surgical History:   Procedure Laterality Date    CATARACT EXTRACTION Bilateral     CERVICAL LAMINECTOMY      COLONOSCOPY      GASTRIC BYPASS      Gastric Surgery For Morbid Obesity Gastric Bypass    HERNIA REPAIR Left     Inguinal Hernia Repair x 2    LUMBAR FUSION      " Lumbar Vertebral Fusion    LUMBAR LAMINECTOMY      MR HEAD ANGIO WO IV CONTRAST  2021    MR HEAD ANGIO WO IV CONTRAST 2021 [facility]    ORIF HIP FRACTURE Left 2023    ORIF HIP PROXIMAL END, NECK, SLIDING HIP SCREW    OTHER SURGICAL HISTORY Left 2022    REVISION JOINT TOTAL KNEE FEMORAL AND ENTIRE TIBIAL COMPONENT    PENILE PROSTHESIS IMPLANT      SEPTOPLASTY      TOTAL KNEE ARTHROPLASTY Bilateral     VITRECTOMY       Anesthesia History    POSSIBLE DIFFICULT INTUBATION     VIOLENT ON EMERGENCE   VERY SLOW TO EMERGE   PER PT :REQUEST NO INHALATION ANESTHESIA , CAUSES INABILITY TO BE REMOVED FROM VENTILATORY ASSISTANCE .(SEE A/P FOR FINDINGS OF EVENT)    Pt denies any past history of anesthetic complications such as PONV, awareness, prolonged sedation, dental damage, aspiration, cardiac arrest, difficult intubation, difficult I.V. access or unexpected hospital admissions.  NO malignant hyperthermia and or pseudo cholinesterase deficiency.    The patient IS NOT  a Caodaism and WILL accept blood and blood products if medically indicated.   No history of blood transfusions .Type and screen not sent.     Social History  Social History     Substance and Sexual Activity   Drug Use Yes    Types: Marijuana    Comment: smokes daily, pipe    INSTRUCTED TO STOP DAY BEFORE SURGERY   Social History     Substance and Sexual Activity   Alcohol Use Yes    Alcohol/week: 14.0 standard drinks of alcohol    Types: 14 Glasses of wine per week    Comment: 2 glasses/day      Social History     Tobacco Use   Smoking Status Former    Current packs/day: 0.00    Average packs/day: 2.0 packs/day for 27.0 years (54.0 ttl pk-yrs)    Types: Cigarettes    Start date:     Quit date: 2006    Years since quittin.1    Passive exposure: Past   Smokeless Tobacco Never          Family History   Problem Relation Name Age of Onset    Other (BOWEL ISCHEMIA/INFARCTION) Mother      Atrial fibrillation Mother       Blood clot Mother      Coronary artery disease Father      Heart attack Father      No Known Problems Brother      Suicidality Maternal Grandfather         Allergies   Allergen Reactions    Ciprofloxacin Itching    Ketorolac Rash    Latex Itching    Penicillins Other     Skin sloughing       Prior to Admission medications    Medication Sig Start Date End Date Taking? Authorizing Provider   acetaminophen (Tylenol) 500 mg tablet Take 2 tablets (1,000 mg) by mouth every 8 hours if needed for moderate pain (4 - 6).   Yes Historical Provider, MD   acyclovir (Zovirax) 800 mg tablet TAKE 1 TABLET EVERY DAY 12/6/24  Yes Libra Corbett MD   cholecalciferol (Vitamin D-3) 50 MCG (2000 UT) tablet Take 2 tablets (4,000 Units) by mouth 2 times a day. 8/7/15  Yes Historical Provider, MD   cloNIDine (Catapres) 0.3 mg tablet TAKE 1 TABLET TWICE DAILY  Patient taking differently: Take 1 tablet (0.3 mg) by mouth once daily. 8/29/24  Yes Libra Corbett MD   cyanocobalamin (Vitamin B-12) 500 mcg tablet Take 1 tablet (500 mcg) by mouth once daily.   Yes Historical Provider, MD   latanoprost (Xalatan) 0.005 % ophthalmic solution Administer 1 drop into both eyes once daily at bedtime.  Patient taking differently: Administer 1 drop into the left eye once daily at bedtime. 1/2/25 1/2/26 Yes Gloria Boone MD   lisinopril 40 mg tablet TAKE 1 TABLET EVERY DAY 1/28/25  Yes Libra Corbett MD   magnesium oxide (Mag-Ox) 400 mg tablet Take 1 tablet (400 mg) by mouth once daily.   Yes Historical Provider, MD   metformin HCl (METFORMIN ORAL) Take 4 tablets by mouth once daily. Unsure of mg   Yes Historical Provider, MD   montelukast (Singulair) 10 mg tablet TAKE 1 TABLET EVERY DAY 12/6/24  Yes Libra Corbett MD   oxybutynin XL (Ditropan-XL) 10 mg 24 hr tablet TAKE 1 TABLET EVERY DAY 1/8/25  Yes Dick Gant MD   pantoprazole (ProtoNix) 20 mg EC tablet TAKE 1 TABLET EVERY DAY 5/25/24  Yes Libra Corbett MD   rosuvastatin (Crestor) 10 mg tablet TAKE 1  "TABLET EVERY DAY 12/17/24  Yes Libra Corbett MD   walker (Ultra-Light Rollator) misc Use for ambulation 11/27/23  Yes Libra Corbett MD   albuterol (ProAir HFA) 90 mcg/actuation inhaler Inhale 2 puffs every 4 hours if needed for wheezing or shortness of breath.  Patient not taking: Reported on 2/10/2025 2/6/25 2/6/26  Libra Corbett MD   Autolet lancing device Use as instructed  Patient not taking: Reported on 2/10/2025 10/7/24 10/7/25  Libra Corbett MD   Blood glucose monitoring meter kit (OneTouch Ultra2 Meter) kit 1 each if needed (check sugar every morning;). Check sugar every morning; dispense onetouch ultra meter  Patient not taking: Reported on 2/10/2025 10/7/24 10/7/25  Libra Corbett MD   chlorhexidine (Peridex) 0.12 % solution 15 ml swish and spit for 30 seconds night prior to surgery and morning of surgery 9/24/24   Madeline Mooney PA-C   chlorhexidine (Peridex) 0.12 % solution Use 15 mL in the mouth or throat once daily for 2 days. 2/10/25 2/12/25  ORALIA rCespo-CNP   dapagliflozin propanediol (Farxiga) 10 mg Take 1 tablet (10 mg) by mouth once daily.  Patient not taking: Reported on 2/10/2025 2/6/25 3/13/26  Libra Corbett MD   hydrOXYzine HCL (Atarax) 25 mg tablet Take 1 tablet (25 mg) by mouth 3 times a day.  Patient not taking: Reported on 2/10/2025 2/15/24 11/21/24  Libra Corbett MD   insulin syr/ndl U100 half mario 0.5 mL 31 gauge x 5/16\" syringe With meals  Patient not taking: Reported on 2/10/2025    Bradly Arias MD   lancets (OneTouch Delica Plus Lancet) 33 gauge misc Check sugar daily  Patient not taking: Reported on 2/10/2025 10/7/24   Libra Corbett MD   melatonin 3 mg tablet Take 1 tablet (3 mg) by mouth once daily at bedtime.  Patient not taking: Reported on 2/10/2025 8/7/15   Historical Provider, MD   OneTouch Ultra Test strip Check sugar every morning  Patient not taking: Reported on 2/10/2025 10/7/24   Libra Corbett MD   sodium,potassium,mag sulfates (Suprep) " 17.5-3.13-1.6 gram recon soln solution Take one bottle twice as directed by the prep instructions  Patient not taking: Reported on 2/10/2025 11/25/24   Mauri Foster MD PhD   tiZANidine (Zanaflex) 4 mg tablet TAKE 1 TABLET THREE TIMES DAILY  Patient not taking: Reported on 2/10/2025 12/6/24   Libra Corbett MD   albuterol (ProAir HFA) 90 mcg/actuation inhaler Inhale 2 puffs every 4 hours if needed for wheezing or shortness of breath. 2/6/25 2/6/25  Libra Corbett MD   dapagliflozin propanediol (Farxiga) 10 mg Take 1 tablet (10 mg) by mouth once daily.  Patient not taking: Reported on 11/21/2024 9/7/24 2/6/25  Libra Corbett MD   lidocaine (Lidoderm) 5 % patch Place 1 patch over 12 hours on the skin once daily. Apply to painful area 12 hours per day, remove for 12 hours.  Patient not taking: Reported on 11/21/2024 9/4/24 2/6/25  Libra Corbett MD   tirzepatide (Mounjaro) 7.5 mg/0.5 mL pen injector Inject 7.5 mg under the skin 1 (one) time per week.  Patient not taking: Reported on 2/6/2025 9/8/24 2/6/25  Libra Corbett MD        PAT ROS:   Constitutional:   neg    Neuro/Psych:   Eyes:   Ears:   Nose:   neg    Mouth:   Throat:    voice change  Neck:   Cardio:   neg    Respiratory:    cough  Endocrine:   GI:   neg    :    FREUENT URINATION   neg     Polyuria: ]]]]]}]]}.  Musculoskeletal:    arthralgias (LTY KNEE HIP)  Hematologic:   Skin:      Physical Exam  Vitals reviewed.   Constitutional:       Appearance: He is obese.   HENT:      Head: Normocephalic.      Mouth/Throat:      Mouth: Mucous membranes are dry.   Eyes:      Extraocular Movements: Extraocular movements intact.      Pupils: Pupils are equal, round, and reactive to light.   Cardiovascular:      Rate and Rhythm: Normal rate and regular rhythm.      Pulses: Normal pulses.      Heart sounds: Normal heart sounds.   Pulmonary:      Effort: Pulmonary effort is normal.      Breath sounds: Normal breath sounds.   Musculoskeletal:         General: Tenderness  "present.      Cervical back: Rigidity present.   Skin:     General: Skin is warm and dry.   Neurological:      Mental Status: He is alert and oriented to person, place, and time.   Psychiatric:         Behavior: Behavior normal.          PAT AIRWAY:   Airway:     Mallampati::  Unable to assess    Neck ROM::  Limited   PT UNABLE TO RAISE CHIN GREATER THAN 5 DEGREES, MOUTH OPEN 2 FB, TONGUE OBSCURES OROPHARYNX       Testing/Diagnostic:     EKG IN EPIC   .FINDINGS:  The score and distribution of calcium in the coronary arteries is as  follows:      LM 0,  .61,  LCx 21.04,  RCA 45.94,      Total 272.59      Lab Results   Component Value Date    WBC 4.6 02/10/2025    HGB 13.8 02/10/2025    HCT 38.6 (L) 02/10/2025    MCV 95 02/10/2025     02/10/2025     Lab Results   Component Value Date    GLUCOSE 178 (H) 02/10/2025    CALCIUM 9.2 02/10/2025     02/10/2025    K 4.3 02/10/2025    CO2 27 02/10/2025     02/10/2025    BUN 18 02/10/2025    CREATININE 0.83 02/10/2025     Lab Results   Component Value Date    HGBA1C 6.2 (H) 02/06/2025     .MRSA PENDING    Patient Specialist/PCP:     Visit Vitals  BP (!) 176/91   Pulse 89   Temp 36.6 °C (97.9 °F)   Resp 18   Ht 1.727 m (5' 8\")   Wt 97 kg (213 lb 13.5 oz)   SpO2 96%   BMI 32.52 kg/m²   Smoking Status Former   BSA 2.16 m²   REPEAT   ASSESSMENT/PLAN    Patient is a 74 year-old  scheduled for Insertion Continence Control Stimulator Sacral Lead ~ Stage 1  with Dr. CHATMAN   on  2/12/255 .  CARDIOVASCULAR:  RCRI score / Risk: The patients score is 1 based on history . Per ACC/AHA guidelines this places him  at  6.0% risk for MACE undergoing a low  risk procedure . The patient has the following risk factors:retinal vein occlusion   Functional Capacity: The patients exercise tolerance is  4  METS. This is based on the patient's walk with wAKER . Patient denies  active cardiac symptoms or anginal equivalents .      PULMONARY:  The patient has the following factors " "that place them at increased risk of perioperative pulmonary complications;age greater than 65/BMI greater than 27/ASTHMA/SMOKER/GERD decrease functional status /greater than 2.5 hour procedure.  Postoperatively the patient would benefit from early pulmonary toilet/incentive spirometry q 1-2 hours while awake/pulse oximetry/cautious use of respiratory depressant medications such as opioids/elevate the HOB/oral hygiene.    POST OPERATIVE INTUBATION /ADMISSION TO SICU:  Per pt he was on ventilatory lane for 3 days postoperative;ly from hip surgery . Review of past surgical postprocedure notes patient was admitted to SICU on 2/1/2022 for the following and extubated the next day  ;   \"He is admitted to the SICU postop.  Hid OR course was notable for  for which he received 2U PRBC and cell saver, need for levo.  He is admitted to the SICU inutbated on precedex and levophed infusions. Extubated this morning, off pressors. \".    BPH :  Pt is NOT  on (alpha 5- reductase inhibitors) Recommendations: , monitior for urinary retention, avoid troncoso catheter if able.    DM2:  The patient has WELL controlled diabetes.Currently the patient manages their diabetes with oral agents, his  recent A1C was 6.2% on2/6/25.  The morning of surgery, the patient  was told to hold regular insulin and hold oral antihyperglycemic medications.  The night before surgery the patient was instructed to administer  half their long acting insulin.        DVT:  CAPRINI SCORE=8  The patient has the following factors that increase his  Risk for thrombus formation ; Virchow's triad , age>70, bmi>30, Surgical procedure >2 hrs  procedure .    Recommendations: DVT prophylaxis  per Dr. Gant protocol . SCD's, SANJUANITA's, and early ambulation are recommended. Heparin or LMWH is recommended for the very high risk .      Risk assessment complete.  Patient is scheduled for  low  surgical risk procedure.  Patient is considered an acceptable  risk to proceed with " the planned procedure.      Preoperative medication instructions were provided and reviewed with the patient.  Any additional testing or evaluation was explained to the patient.  Nothing by mouth instructions were discussed and patient's questions were answered prior to conclusion to this encounter.  Patient verbalized understanding of preoperative instructions given in preadmission testing; discharge instructions available in EMR.

## 2025-02-10 NOTE — H&P (VIEW-ONLY)
"CPM/PAT Evaluation       Name: Elliot Larson (Elliot Larson)  /Age: 1950/74 y.o.     SURGEON :DR NEETA CHATMAN   PROCEDURE AND DATE :  Insertion Continence Control Stimulator Sacral Lead ~ Stage 1 25    This a 74 y.o. male who presents for presurgical evaluation for above mentioned procedure. Pt with long standing urge incontinence . History of Urolift .   . After discussion of the risks and benefits with Dr. CHATMAN  the patient elects to proceed with the planned procedure.     Past Medical History:   Diagnosis Date    Adverse effect of anesthesia     After hip surgery \"took 3 days to come off breathing machine\". emergence deliruim- becomes very violent    Anemia     24 H/h  13.7/39.1 Follows with heme/onc Manny Chandra, APRN-CNP LOV 24    Asthma     last exacerbation in Dec with URI- alb neb used    Chronic bronchitis (Multi)     Delayed emergence from general anesthesia     Depression     Dyslipidemia     Elevated coronary artery calcium score     CT 24  272.59    Enlarged prostate with lower urinary tract symptoms (LUTS)     GERD (gastroesophageal reflux disease)     under control    History of cardiovascular stress test 2024    normal    History of central retinal vein occlusion (CMS-HCC)     right eye    Hypertension     Legally blind     right eye    Lung nodule     CT 24 No suspicious pulmonary nodules identified.    OAB (overactive bladder)     Osteoarthritis     Postlaminectomy syndrome, lumbar     Thoracic ascending aortic aneurysm (CMS-HCC)     CT 24 3.9 cm and unchanged from 2018    Type 2 diabetes mellitus     24 A1c 7.6    Urge incontinence     Urinary tract infection        Past Surgical History:   Procedure Laterality Date    CATARACT EXTRACTION Bilateral     CERVICAL LAMINECTOMY      COLONOSCOPY      GASTRIC BYPASS      Gastric Surgery For Morbid Obesity Gastric Bypass    HERNIA REPAIR Left     Inguinal Hernia Repair x 2    LUMBAR FUSION      " Lumbar Vertebral Fusion    LUMBAR LAMINECTOMY      MR HEAD ANGIO WO IV CONTRAST  2021    MR HEAD ANGIO WO IV CONTRAST 2021 [facility]    ORIF HIP FRACTURE Left 2023    ORIF HIP PROXIMAL END, NECK, SLIDING HIP SCREW    OTHER SURGICAL HISTORY Left 2022    REVISION JOINT TOTAL KNEE FEMORAL AND ENTIRE TIBIAL COMPONENT    PENILE PROSTHESIS IMPLANT      SEPTOPLASTY      TOTAL KNEE ARTHROPLASTY Bilateral     VITRECTOMY       Anesthesia History    POSSIBLE DIFFICULT INTUBATION     VIOLENT ON EMERGENCE   VERY SLOW TO EMERGE   PER PT :REQUEST NO INHALATION ANESTHESIA , CAUSES INABILITY TO BE REMOVED FROM VENTILATORY ASSISTANCE .(SEE A/P FOR FINDINGS OF EVENT)    Pt denies any past history of anesthetic complications such as PONV, awareness, prolonged sedation, dental damage, aspiration, cardiac arrest, difficult intubation, difficult I.V. access or unexpected hospital admissions.  NO malignant hyperthermia and or pseudo cholinesterase deficiency.    The patient IS NOT  a [employer] and WILL accept blood and blood products if medically indicated.   No history of blood transfusions .Type and screen not sent.     Social History  Social History     Substance and Sexual Activity   Drug Use Yes    Types: Marijuana    Comment: smokes daily, pipe    INSTRUCTED TO STOP DAY BEFORE SURGERY   Social History     Substance and Sexual Activity   Alcohol Use Yes    Alcohol/week: 14.0 standard drinks of alcohol    Types: 14 Glasses of wine per week    Comment: 2 glasses/day      Social History     Tobacco Use   Smoking Status Former    Current packs/day: 0.00    Average packs/day: 2.0 packs/day for 27.0 years (54.0 ttl pk-yrs)    Types: Cigarettes    Start date:     Quit date: 2006    Years since quittin.1    Passive exposure: Past   Smokeless Tobacco Never          Family History   Problem Relation Name Age of Onset    Other (BOWEL ISCHEMIA/INFARCTION) Mother      Atrial fibrillation Mother       Blood clot Mother      Coronary artery disease Father      Heart attack Father      No Known Problems Brother      Suicidality Maternal Grandfather         Allergies   Allergen Reactions    Ciprofloxacin Itching    Ketorolac Rash    Latex Itching    Penicillins Other     Skin sloughing       Prior to Admission medications    Medication Sig Start Date End Date Taking? Authorizing Provider   acetaminophen (Tylenol) 500 mg tablet Take 2 tablets (1,000 mg) by mouth every 8 hours if needed for moderate pain (4 - 6).   Yes Historical Provider, MD   acyclovir (Zovirax) 800 mg tablet TAKE 1 TABLET EVERY DAY 12/6/24  Yes Libra Corbett MD   cholecalciferol (Vitamin D-3) 50 MCG (2000 UT) tablet Take 2 tablets (4,000 Units) by mouth 2 times a day. 8/7/15  Yes Historical Provider, MD   cloNIDine (Catapres) 0.3 mg tablet TAKE 1 TABLET TWICE DAILY  Patient taking differently: Take 1 tablet (0.3 mg) by mouth once daily. 8/29/24  Yes Libra Corbett MD   cyanocobalamin (Vitamin B-12) 500 mcg tablet Take 1 tablet (500 mcg) by mouth once daily.   Yes Historical Provider, MD   latanoprost (Xalatan) 0.005 % ophthalmic solution Administer 1 drop into both eyes once daily at bedtime.  Patient taking differently: Administer 1 drop into the left eye once daily at bedtime. 1/2/25 1/2/26 Yes Gloria Boone MD   lisinopril 40 mg tablet TAKE 1 TABLET EVERY DAY 1/28/25  Yes Libra Corbett MD   magnesium oxide (Mag-Ox) 400 mg tablet Take 1 tablet (400 mg) by mouth once daily.   Yes Historical Provider, MD   metformin HCl (METFORMIN ORAL) Take 4 tablets by mouth once daily. Unsure of mg   Yes Historical Provider, MD   montelukast (Singulair) 10 mg tablet TAKE 1 TABLET EVERY DAY 12/6/24  Yes Libra Corbett MD   oxybutynin XL (Ditropan-XL) 10 mg 24 hr tablet TAKE 1 TABLET EVERY DAY 1/8/25  Yes Dick Gant MD   pantoprazole (ProtoNix) 20 mg EC tablet TAKE 1 TABLET EVERY DAY 5/25/24  Yes Libra Corbett MD   rosuvastatin (Crestor) 10 mg tablet TAKE 1  "TABLET EVERY DAY 12/17/24  Yes Libra Corbett MD   walker (Ultra-Light Rollator) misc Use for ambulation 11/27/23  Yes Libra Corbett MD   albuterol (ProAir HFA) 90 mcg/actuation inhaler Inhale 2 puffs every 4 hours if needed for wheezing or shortness of breath.  Patient not taking: Reported on 2/10/2025 2/6/25 2/6/26  Libra Corbett MD   Autolet lancing device Use as instructed  Patient not taking: Reported on 2/10/2025 10/7/24 10/7/25  Libra Corbett MD   Blood glucose monitoring meter kit (OneTouch Ultra2 Meter) kit 1 each if needed (check sugar every morning;). Check sugar every morning; dispense onetouch ultra meter  Patient not taking: Reported on 2/10/2025 10/7/24 10/7/25  Libra Corbett MD   chlorhexidine (Peridex) 0.12 % solution 15 ml swish and spit for 30 seconds night prior to surgery and morning of surgery 9/24/24   Madeline Mooney PA-C   chlorhexidine (Peridex) 0.12 % solution Use 15 mL in the mouth or throat once daily for 2 days. 2/10/25 2/12/25  ORALIA Crespo-CNP   dapagliflozin propanediol (Farxiga) 10 mg Take 1 tablet (10 mg) by mouth once daily.  Patient not taking: Reported on 2/10/2025 2/6/25 3/13/26  Libra Corbett MD   hydrOXYzine HCL (Atarax) 25 mg tablet Take 1 tablet (25 mg) by mouth 3 times a day.  Patient not taking: Reported on 2/10/2025 2/15/24 11/21/24  Libra Corbett MD   insulin syr/ndl U100 half mario 0.5 mL 31 gauge x 5/16\" syringe With meals  Patient not taking: Reported on 2/10/2025    Bradly Arias MD   lancets (OneTouch Delica Plus Lancet) 33 gauge misc Check sugar daily  Patient not taking: Reported on 2/10/2025 10/7/24   Libra Corbett MD   melatonin 3 mg tablet Take 1 tablet (3 mg) by mouth once daily at bedtime.  Patient not taking: Reported on 2/10/2025 8/7/15   Historical Provider, MD   OneTouch Ultra Test strip Check sugar every morning  Patient not taking: Reported on 2/10/2025 10/7/24   Libra Corbett MD   sodium,potassium,mag sulfates (Suprep) " 17.5-3.13-1.6 gram recon soln solution Take one bottle twice as directed by the prep instructions  Patient not taking: Reported on 2/10/2025 11/25/24   Mauri Foster MD PhD   tiZANidine (Zanaflex) 4 mg tablet TAKE 1 TABLET THREE TIMES DAILY  Patient not taking: Reported on 2/10/2025 12/6/24   Libra Corbett MD   albuterol (ProAir HFA) 90 mcg/actuation inhaler Inhale 2 puffs every 4 hours if needed for wheezing or shortness of breath. 2/6/25 2/6/25  Libra Corbett MD   dapagliflozin propanediol (Farxiga) 10 mg Take 1 tablet (10 mg) by mouth once daily.  Patient not taking: Reported on 11/21/2024 9/7/24 2/6/25  Lbira Corbett MD   lidocaine (Lidoderm) 5 % patch Place 1 patch over 12 hours on the skin once daily. Apply to painful area 12 hours per day, remove for 12 hours.  Patient not taking: Reported on 11/21/2024 9/4/24 2/6/25  Libra Corbett MD   tirzepatide (Mounjaro) 7.5 mg/0.5 mL pen injector Inject 7.5 mg under the skin 1 (one) time per week.  Patient not taking: Reported on 2/6/2025 9/8/24 2/6/25  Libra Corbett MD        PAT ROS:   Constitutional:   neg    Neuro/Psych:   Eyes:   Ears:   Nose:   neg    Mouth:   Throat:    voice change  Neck:   Cardio:   neg    Respiratory:    cough  Endocrine:   GI:   neg    :    FREUENT URINATION   neg     Polyuria: ]]]]]}]]}.  Musculoskeletal:    arthralgias (LTY KNEE HIP)  Hematologic:   Skin:      Physical Exam  Vitals reviewed.   Constitutional:       Appearance: He is obese.   HENT:      Head: Normocephalic.      Mouth/Throat:      Mouth: Mucous membranes are dry.   Eyes:      Extraocular Movements: Extraocular movements intact.      Pupils: Pupils are equal, round, and reactive to light.   Cardiovascular:      Rate and Rhythm: Normal rate and regular rhythm.      Pulses: Normal pulses.      Heart sounds: Normal heart sounds.   Pulmonary:      Effort: Pulmonary effort is normal.      Breath sounds: Normal breath sounds.   Musculoskeletal:         General: Tenderness  "present.      Cervical back: Rigidity present.   Skin:     General: Skin is warm and dry.   Neurological:      Mental Status: He is alert and oriented to person, place, and time.   Psychiatric:         Behavior: Behavior normal.          PAT AIRWAY:   Airway:     Mallampati::  Unable to assess    Neck ROM::  Limited   PT UNABLE TO RAISE CHIN GREATER THAN 5 DEGREES, MOUTH OPEN 2 FB, TONGUE OBSCURES OROPHARYNX       Testing/Diagnostic:     EKG IN EPIC   .FINDINGS:  The score and distribution of calcium in the coronary arteries is as  follows:      LM 0,  .61,  LCx 21.04,  RCA 45.94,      Total 272.59      Lab Results   Component Value Date    WBC 4.6 02/10/2025    HGB 13.8 02/10/2025    HCT 38.6 (L) 02/10/2025    MCV 95 02/10/2025     02/10/2025     Lab Results   Component Value Date    GLUCOSE 178 (H) 02/10/2025    CALCIUM 9.2 02/10/2025     02/10/2025    K 4.3 02/10/2025    CO2 27 02/10/2025     02/10/2025    BUN 18 02/10/2025    CREATININE 0.83 02/10/2025     Lab Results   Component Value Date    HGBA1C 6.2 (H) 02/06/2025     .MRSA PENDING    Patient Specialist/PCP:     Visit Vitals  BP (!) 176/91   Pulse 89   Temp 36.6 °C (97.9 °F)   Resp 18   Ht 1.727 m (5' 8\")   Wt 97 kg (213 lb 13.5 oz)   SpO2 96%   BMI 32.52 kg/m²   Smoking Status Former   BSA 2.16 m²   REPEAT   ASSESSMENT/PLAN    Patient is a 74 year-old  scheduled for Insertion Continence Control Stimulator Sacral Lead ~ Stage 1  with Dr. CHATMAN   on  2/12/255 .  CARDIOVASCULAR:  RCRI score / Risk: The patients score is 1 based on history . Per ACC/AHA guidelines this places him  at  6.0% risk for MACE undergoing a low  risk procedure . The patient has the following risk factors:retinal vein occlusion   Functional Capacity: The patients exercise tolerance is  4  METS. This is based on the patient's walk with wAKER . Patient denies  active cardiac symptoms or anginal equivalents .      PULMONARY:  The patient has the following factors " "that place them at increased risk of perioperative pulmonary complications;age greater than 65/BMI greater than 27/ASTHMA/SMOKER/GERD decrease functional status /greater than 2.5 hour procedure.  Postoperatively the patient would benefit from early pulmonary toilet/incentive spirometry q 1-2 hours while awake/pulse oximetry/cautious use of respiratory depressant medications such as opioids/elevate the HOB/oral hygiene.    POST OPERATIVE INTUBATION /ADMISSION TO SICU:  Per pt he was on ventilatory lane for 3 days postoperative;ly from hip surgery . Review of past surgical postprocedure notes patient was admitted to SICU on 2/1/2022 for the following and extubated the next day  ;   \"He is admitted to the SICU postop.  Hid OR course was notable for  for which he received 2U PRBC and cell saver, need for levo.  He is admitted to the SICU inutbated on precedex and levophed infusions. Extubated this morning, off pressors. \".    BPH :  Pt is NOT  on (alpha 5- reductase inhibitors) Recommendations: , monitior for urinary retention, avoid troncoso catheter if able.    DM2:  The patient has WELL controlled diabetes.Currently the patient manages their diabetes with oral agents, his  recent A1C was 6.2% on2/6/25.  The morning of surgery, the patient  was told to hold regular insulin and hold oral antihyperglycemic medications.  The night before surgery the patient was instructed to administer  half their long acting insulin.        DVT:  CAPRINI SCORE=8  The patient has the following factors that increase his  Risk for thrombus formation ; Virchow's triad , age>70, bmi>30, Surgical procedure >2 hrs  procedure .    Recommendations: DVT prophylaxis  per Dr. Gant protocol . SCD's, SANJUANITA's, and early ambulation are recommended. Heparin or LMWH is recommended for the very high risk .      Risk assessment complete.  Patient is scheduled for  low  surgical risk procedure.  Patient is considered an acceptable  risk to proceed with " the planned procedure.      Preoperative medication instructions were provided and reviewed with the patient.  Any additional testing or evaluation was explained to the patient.  Nothing by mouth instructions were discussed and patient's questions were answered prior to conclusion to this encounter.  Patient verbalized understanding of preoperative instructions given in preadmission testing; discharge instructions available in EMR.

## 2025-02-11 ENCOUNTER — APPOINTMENT (OUTPATIENT)
Dept: RADIOLOGY | Facility: HOSPITAL | Age: 75
End: 2025-02-11
Payer: MEDICARE

## 2025-02-11 ENCOUNTER — APPOINTMENT (OUTPATIENT)
Facility: CLINIC | Age: 75
End: 2025-02-11
Payer: MEDICARE

## 2025-02-12 ENCOUNTER — ANESTHESIA (OUTPATIENT)
Dept: OPERATING ROOM | Facility: HOSPITAL | Age: 75
End: 2025-02-12
Payer: MEDICARE

## 2025-02-12 ENCOUNTER — ANESTHESIA EVENT (OUTPATIENT)
Dept: OPERATING ROOM | Facility: HOSPITAL | Age: 75
End: 2025-02-12
Payer: MEDICARE

## 2025-02-12 ENCOUNTER — HOSPITAL ENCOUNTER (OUTPATIENT)
Facility: HOSPITAL | Age: 75
Discharge: HOME | End: 2025-02-13
Attending: UROLOGY | Admitting: UROLOGY
Payer: MEDICARE

## 2025-02-12 ENCOUNTER — APPOINTMENT (OUTPATIENT)
Dept: RADIOLOGY | Facility: HOSPITAL | Age: 75
End: 2025-02-12
Payer: MEDICARE

## 2025-02-12 DIAGNOSIS — K86.9 LESION OF PANCREAS (HHS-HCC): Primary | ICD-10-CM

## 2025-02-12 DIAGNOSIS — E11.9 DIABETES MELLITUS WITHOUT OPHTHALMIC MANIFESTATIONS (MULTI): ICD-10-CM

## 2025-02-12 DIAGNOSIS — N39.41 URGE INCONTINENCE: Primary | ICD-10-CM

## 2025-02-12 LAB
GLUCOSE BLD MANUAL STRIP-MCNC: 139 MG/DL (ref 74–99)
GLUCOSE BLD MANUAL STRIP-MCNC: 187 MG/DL (ref 74–99)
GLUCOSE BLD MANUAL STRIP-MCNC: 335 MG/DL (ref 74–99)
GLUCOSE BLD MANUAL STRIP-MCNC: 494 MG/DL (ref 74–99)
GLUCOSE BLD MANUAL STRIP-MCNC: 582 MG/DL (ref 74–99)
STAPHYLOCOCCUS SPEC CULT: NORMAL

## 2025-02-12 PROCEDURE — A64561 PR PRQ IMPLTJ NEUROSTIM ELTRD SACRAL NRVE W/IMAGING: Performed by: ANESTHESIOLOGIST ASSISTANT

## 2025-02-12 PROCEDURE — 82947 ASSAY GLUCOSE BLOOD QUANT: CPT

## 2025-02-12 PROCEDURE — 2500000001 HC RX 250 WO HCPCS SELF ADMINISTERED DRUGS (ALT 637 FOR MEDICARE OP)

## 2025-02-12 PROCEDURE — L8679 IMP NEUROSTI PLS GN ANY TYPE: HCPCS | Performed by: UROLOGY

## 2025-02-12 PROCEDURE — 2720000007 HC OR 272 NO HCPCS: Performed by: UROLOGY

## 2025-02-12 PROCEDURE — 2500000002 HC RX 250 W HCPCS SELF ADMINISTERED DRUGS (ALT 637 FOR MEDICARE OP, ALT 636 FOR OP/ED): Mod: MUE

## 2025-02-12 PROCEDURE — G0378 HOSPITAL OBSERVATION PER HR: HCPCS

## 2025-02-12 PROCEDURE — 7100000011 HC EXTENDED STAY RECOVERY HOURLY - NURSING UNIT

## 2025-02-12 PROCEDURE — 2500000004 HC RX 250 GENERAL PHARMACY W/ HCPCS (ALT 636 FOR OP/ED): Performed by: UROLOGY

## 2025-02-12 PROCEDURE — L8678 HC OR 278 NO HCPCS: HCPCS | Performed by: UROLOGY

## 2025-02-12 PROCEDURE — C1889 IMPLANT/INSERT DEVICE, NOC: HCPCS | Performed by: UROLOGY

## 2025-02-12 PROCEDURE — 2780000003 HC OR 278 NO HCPCS: Performed by: UROLOGY

## 2025-02-12 PROCEDURE — 3600000004 HC OR TIME - INITIAL BASE CHARGE - PROCEDURE LEVEL FOUR: Performed by: UROLOGY

## 2025-02-12 PROCEDURE — L8681 PT PRGRM FOR IMPLT NEUROSTIM: HCPCS | Performed by: UROLOGY

## 2025-02-12 PROCEDURE — 3700000001 HC GENERAL ANESTHESIA TIME - INITIAL BASE CHARGE: Performed by: UROLOGY

## 2025-02-12 PROCEDURE — 7100000002 HC RECOVERY ROOM TIME - EACH INCREMENTAL 1 MINUTE: Performed by: UROLOGY

## 2025-02-12 PROCEDURE — 3700000002 HC GENERAL ANESTHESIA TIME - EACH INCREMENTAL 1 MINUTE: Performed by: UROLOGY

## 2025-02-12 PROCEDURE — 64561 IMPLANT NEUROELECTRODES: CPT | Performed by: UROLOGY

## 2025-02-12 PROCEDURE — C1883 ADAPT/EXT, PACING/NEURO LEAD: HCPCS | Performed by: UROLOGY

## 2025-02-12 PROCEDURE — 76000 FLUOROSCOPY <1 HR PHYS/QHP: CPT

## 2025-02-12 PROCEDURE — A64561 PR PRQ IMPLTJ NEUROSTIM ELTRD SACRAL NRVE W/IMAGING: Performed by: ANESTHESIOLOGY

## 2025-02-12 PROCEDURE — C1778 LEAD, NEUROSTIMULATOR: HCPCS | Performed by: UROLOGY

## 2025-02-12 PROCEDURE — 2500000002 HC RX 250 W HCPCS SELF ADMINISTERED DRUGS (ALT 637 FOR MEDICARE OP, ALT 636 FOR OP/ED)

## 2025-02-12 PROCEDURE — 7100000001 HC RECOVERY ROOM TIME - INITIAL BASE CHARGE: Performed by: UROLOGY

## 2025-02-12 PROCEDURE — 99100 ANES PT EXTEME AGE<1 YR&>70: CPT | Performed by: ANESTHESIOLOGY

## 2025-02-12 PROCEDURE — 3600000009 HC OR TIME - EACH INCREMENTAL 1 MINUTE - PROCEDURE LEVEL FOUR: Performed by: UROLOGY

## 2025-02-12 PROCEDURE — 2500000004 HC RX 250 GENERAL PHARMACY W/ HCPCS (ALT 636 FOR OP/ED)

## 2025-02-12 PROCEDURE — 2500000005 HC RX 250 GENERAL PHARMACY W/O HCPCS: Performed by: ANESTHESIOLOGY

## 2025-02-12 PROCEDURE — 96372 THER/PROPH/DIAG INJ SC/IM: CPT

## 2025-02-12 PROCEDURE — 2500000004 HC RX 250 GENERAL PHARMACY W/ HCPCS (ALT 636 FOR OP/ED): Performed by: ANESTHESIOLOGIST ASSISTANT

## 2025-02-12 DEVICE — TINED LEAD KIT
Type: IMPLANTABLE DEVICE | Site: BACK | Status: FUNCTIONAL
Brand: AXONICS

## 2025-02-12 DEVICE — PERCUTANEOUS EXTENSION
Type: IMPLANTABLE DEVICE | Site: BACK | Status: FUNCTIONAL
Brand: AXONICS

## 2025-02-12 RX ORDER — FENTANYL CITRATE 50 UG/ML
INJECTION, SOLUTION INTRAMUSCULAR; INTRAVENOUS AS NEEDED
Status: DISCONTINUED | OUTPATIENT
Start: 2025-02-12 | End: 2025-02-12

## 2025-02-12 RX ORDER — LIDOCAINE HYDROCHLORIDE 10 MG/ML
INJECTION, SOLUTION INFILTRATION; PERINEURAL AS NEEDED
Status: DISCONTINUED | OUTPATIENT
Start: 2025-02-12 | End: 2025-02-12

## 2025-02-12 RX ORDER — PROPOFOL 10 MG/ML
INJECTION, EMULSION INTRAVENOUS AS NEEDED
Status: DISCONTINUED | OUTPATIENT
Start: 2025-02-12 | End: 2025-02-12

## 2025-02-12 RX ORDER — PANTOPRAZOLE SODIUM 20 MG/1
20 TABLET, DELAYED RELEASE ORAL DAILY
Status: DISCONTINUED | OUTPATIENT
Start: 2025-02-13 | End: 2025-02-13 | Stop reason: HOSPADM

## 2025-02-12 RX ORDER — DEXTROSE 50 % IN WATER (D50W) INTRAVENOUS SYRINGE
25
Status: DISCONTINUED | OUTPATIENT
Start: 2025-02-12 | End: 2025-02-13 | Stop reason: HOSPADM

## 2025-02-12 RX ORDER — ONDANSETRON HYDROCHLORIDE 2 MG/ML
4 INJECTION, SOLUTION INTRAVENOUS EVERY 8 HOURS PRN
Status: DISCONTINUED | OUTPATIENT
Start: 2025-02-12 | End: 2025-02-13 | Stop reason: HOSPADM

## 2025-02-12 RX ORDER — INSULIN LISPRO 100 [IU]/ML
8 INJECTION, SOLUTION INTRAVENOUS; SUBCUTANEOUS ONCE
Status: COMPLETED | OUTPATIENT
Start: 2025-02-12 | End: 2025-02-12

## 2025-02-12 RX ORDER — TIZANIDINE 4 MG/1
8 TABLET ORAL ONCE
Status: COMPLETED | OUTPATIENT
Start: 2025-02-12 | End: 2025-02-12

## 2025-02-12 RX ORDER — LIDOCAINE HYDROCHLORIDE 10 MG/ML
0.1 INJECTION, SOLUTION EPIDURAL; INFILTRATION; INTRACAUDAL; PERINEURAL ONCE
Status: DISCONTINUED | OUTPATIENT
Start: 2025-02-12 | End: 2025-02-12 | Stop reason: HOSPADM

## 2025-02-12 RX ORDER — PROPOFOL 10 MG/ML
INJECTION, EMULSION INTRAVENOUS CONTINUOUS PRN
Status: DISCONTINUED | OUTPATIENT
Start: 2025-02-12 | End: 2025-02-12

## 2025-02-12 RX ORDER — VANCOMYCIN HYDROCHLORIDE 1 G/200ML
1000 INJECTION, SOLUTION INTRAVENOUS ONCE
Status: COMPLETED | OUTPATIENT
Start: 2025-02-12 | End: 2025-02-12

## 2025-02-12 RX ORDER — TALC
5 POWDER (GRAM) TOPICAL NIGHTLY PRN
Status: DISCONTINUED | OUTPATIENT
Start: 2025-02-12 | End: 2025-02-13 | Stop reason: HOSPADM

## 2025-02-12 RX ORDER — ONDANSETRON 4 MG/1
4 TABLET, FILM COATED ORAL EVERY 8 HOURS PRN
Status: DISCONTINUED | OUTPATIENT
Start: 2025-02-12 | End: 2025-02-13 | Stop reason: HOSPADM

## 2025-02-12 RX ORDER — ACETAMINOPHEN 325 MG/1
650 TABLET ORAL EVERY 4 HOURS PRN
Status: DISCONTINUED | OUTPATIENT
Start: 2025-02-12 | End: 2025-02-12 | Stop reason: HOSPADM

## 2025-02-12 RX ORDER — SODIUM CHLORIDE, SODIUM GLUCONATE, SODIUM ACETATE, POTASSIUM CHLORIDE AND MAGNESIUM CHLORIDE 30; 37; 368; 526; 502 MG/100ML; MG/100ML; MG/100ML; MG/100ML; MG/100ML
INJECTION, SOLUTION INTRAVENOUS CONTINUOUS PRN
Status: DISCONTINUED | OUTPATIENT
Start: 2025-02-12 | End: 2025-02-12

## 2025-02-12 RX ORDER — INSULIN LISPRO 100 [IU]/ML
0-5 INJECTION, SOLUTION INTRAVENOUS; SUBCUTANEOUS
Status: DISCONTINUED | OUTPATIENT
Start: 2025-02-12 | End: 2025-02-12

## 2025-02-12 RX ORDER — METFORMIN HYDROCHLORIDE 500 MG/1
1000 TABLET ORAL
Status: DISCONTINUED | OUTPATIENT
Start: 2025-02-12 | End: 2025-02-13 | Stop reason: HOSPADM

## 2025-02-12 RX ORDER — INSULIN LISPRO 100 [IU]/ML
0-10 INJECTION, SOLUTION INTRAVENOUS; SUBCUTANEOUS
Status: DISCONTINUED | OUTPATIENT
Start: 2025-02-13 | End: 2025-02-13 | Stop reason: HOSPADM

## 2025-02-12 RX ORDER — LANOLIN ALCOHOL/MO/W.PET/CERES
400 CREAM (GRAM) TOPICAL DAILY
Status: DISCONTINUED | OUTPATIENT
Start: 2025-02-12 | End: 2025-02-13 | Stop reason: HOSPADM

## 2025-02-12 RX ORDER — MONTELUKAST SODIUM 10 MG/1
10 TABLET ORAL DAILY
Status: DISCONTINUED | OUTPATIENT
Start: 2025-02-13 | End: 2025-02-13 | Stop reason: HOSPADM

## 2025-02-12 RX ORDER — OXYBUTYNIN CHLORIDE 5 MG/1
5 TABLET ORAL 3 TIMES DAILY PRN
Status: DISCONTINUED | OUTPATIENT
Start: 2025-02-12 | End: 2025-02-13 | Stop reason: HOSPADM

## 2025-02-12 RX ORDER — OXYCODONE HYDROCHLORIDE 5 MG/1
5 TABLET ORAL EVERY 4 HOURS PRN
Status: DISCONTINUED | OUTPATIENT
Start: 2025-02-12 | End: 2025-02-12 | Stop reason: HOSPADM

## 2025-02-12 RX ORDER — TIZANIDINE 4 MG/1
4 TABLET ORAL 3 TIMES DAILY
Status: DISCONTINUED | OUTPATIENT
Start: 2025-02-12 | End: 2025-02-13 | Stop reason: HOSPADM

## 2025-02-12 RX ORDER — SODIUM CHLORIDE, SODIUM LACTATE, POTASSIUM CHLORIDE, CALCIUM CHLORIDE 600; 310; 30; 20 MG/100ML; MG/100ML; MG/100ML; MG/100ML
100 INJECTION, SOLUTION INTRAVENOUS CONTINUOUS
Status: ACTIVE | OUTPATIENT
Start: 2025-02-12 | End: 2025-02-12

## 2025-02-12 RX ORDER — LATANOPROST 50 UG/ML
1 SOLUTION/ DROPS OPHTHALMIC NIGHTLY
Status: DISCONTINUED | OUTPATIENT
Start: 2025-02-12 | End: 2025-02-13 | Stop reason: HOSPADM

## 2025-02-12 RX ORDER — ONDANSETRON HYDROCHLORIDE 2 MG/ML
INJECTION, SOLUTION INTRAVENOUS AS NEEDED
Status: DISCONTINUED | OUTPATIENT
Start: 2025-02-12 | End: 2025-02-12

## 2025-02-12 RX ORDER — MIDAZOLAM HYDROCHLORIDE 1 MG/ML
INJECTION INTRAMUSCULAR; INTRAVENOUS AS NEEDED
Status: DISCONTINUED | OUTPATIENT
Start: 2025-02-12 | End: 2025-02-12

## 2025-02-12 RX ORDER — ACETAMINOPHEN 325 MG/1
650 TABLET ORAL EVERY 4 HOURS PRN
Status: DISCONTINUED | OUTPATIENT
Start: 2025-02-12 | End: 2025-02-13 | Stop reason: HOSPADM

## 2025-02-12 RX ORDER — LIDOCAINE HYDROCHLORIDE 10 MG/ML
INJECTION, SOLUTION EPIDURAL; INFILTRATION; INTRACAUDAL; PERINEURAL AS NEEDED
Status: DISCONTINUED | OUTPATIENT
Start: 2025-02-12 | End: 2025-02-12 | Stop reason: HOSPADM

## 2025-02-12 RX ORDER — DAPAGLIFLOZIN 10 MG/1
10 TABLET, FILM COATED ORAL DAILY
Status: DISCONTINUED | OUTPATIENT
Start: 2025-02-13 | End: 2025-02-13 | Stop reason: HOSPADM

## 2025-02-12 RX ORDER — DEXTROSE 50 % IN WATER (D50W) INTRAVENOUS SYRINGE
12.5
Status: DISCONTINUED | OUTPATIENT
Start: 2025-02-12 | End: 2025-02-13 | Stop reason: HOSPADM

## 2025-02-12 RX ORDER — ROSUVASTATIN CALCIUM 10 MG/1
10 TABLET, COATED ORAL DAILY
Status: DISCONTINUED | OUTPATIENT
Start: 2025-02-13 | End: 2025-02-13 | Stop reason: HOSPADM

## 2025-02-12 RX ORDER — LISINOPRIL 20 MG/1
40 TABLET ORAL DAILY
Status: DISCONTINUED | OUTPATIENT
Start: 2025-02-13 | End: 2025-02-13 | Stop reason: HOSPADM

## 2025-02-12 RX ORDER — CHLORHEXIDINE GLUCONATE 40 MG/ML
SOLUTION TOPICAL DAILY PRN
Status: DISCONTINUED | OUTPATIENT
Start: 2025-02-12 | End: 2025-02-12 | Stop reason: HOSPADM

## 2025-02-12 RX ORDER — ESMOLOL HYDROCHLORIDE 10 MG/ML
INJECTION INTRAVENOUS AS NEEDED
Status: DISCONTINUED | OUTPATIENT
Start: 2025-02-12 | End: 2025-02-12

## 2025-02-12 RX ORDER — ENOXAPARIN SODIUM 100 MG/ML
40 INJECTION SUBCUTANEOUS EVERY 24 HOURS
Status: DISCONTINUED | OUTPATIENT
Start: 2025-02-12 | End: 2025-02-13 | Stop reason: HOSPADM

## 2025-02-12 RX ORDER — ACYCLOVIR 400 MG/1
800 TABLET ORAL DAILY
Status: DISCONTINUED | OUTPATIENT
Start: 2025-02-12 | End: 2025-02-13 | Stop reason: HOSPADM

## 2025-02-12 RX ORDER — POLYETHYLENE GLYCOL 3350 17 G/17G
17 POWDER, FOR SOLUTION ORAL DAILY PRN
Status: DISCONTINUED | OUTPATIENT
Start: 2025-02-12 | End: 2025-02-13 | Stop reason: HOSPADM

## 2025-02-12 RX ADMIN — INSULIN LISPRO 8 UNITS: 100 INJECTION, SOLUTION INTRAVENOUS; SUBCUTANEOUS at 20:59

## 2025-02-12 RX ADMIN — ESMOLOL HYDROCHLORIDE 20 MG: 10 INJECTION, SOLUTION INTRAVENOUS at 10:09

## 2025-02-12 RX ADMIN — VANCOMYCIN HYDROCHLORIDE 1000 MG: 1 INJECTION, SOLUTION INTRAVENOUS at 08:46

## 2025-02-12 RX ADMIN — METFORMIN HYDROCHLORIDE 1000 MG: 500 TABLET ORAL at 20:59

## 2025-02-12 RX ADMIN — VANCOMYCIN HYDROCHLORIDE 1 G: 1 INJECTION, POWDER, LYOPHILIZED, FOR SOLUTION INTRAVENOUS at 08:46

## 2025-02-12 RX ADMIN — INSULIN LISPRO 8 UNITS: 100 INJECTION, SOLUTION INTRAVENOUS; SUBCUTANEOUS at 22:45

## 2025-02-12 RX ADMIN — PROPOFOL 10 MG: 10 INJECTION, EMULSION INTRAVENOUS at 10:34

## 2025-02-12 RX ADMIN — FENTANYL CITRATE 50 MCG: 50 INJECTION, SOLUTION INTRAMUSCULAR; INTRAVENOUS at 09:46

## 2025-02-12 RX ADMIN — PROPOFOL 50 MG: 10 INJECTION, EMULSION INTRAVENOUS at 10:16

## 2025-02-12 RX ADMIN — LIDOCAINE HYDROCHLORIDE 50 MG: 10 INJECTION, SOLUTION INFILTRATION; PERINEURAL at 09:46

## 2025-02-12 RX ADMIN — ACYCLOVIR 800 MG: 400 TABLET ORAL at 14:55

## 2025-02-12 RX ADMIN — ESMOLOL HYDROCHLORIDE 30 MG: 10 INJECTION, SOLUTION INTRAVENOUS at 10:21

## 2025-02-12 RX ADMIN — FENTANYL CITRATE 25 MCG: 50 INJECTION, SOLUTION INTRAMUSCULAR; INTRAVENOUS at 10:16

## 2025-02-12 RX ADMIN — PROPOFOL 50 MG: 10 INJECTION, EMULSION INTRAVENOUS at 10:04

## 2025-02-12 RX ADMIN — FENTANYL CITRATE 25 MCG: 50 INJECTION, SOLUTION INTRAMUSCULAR; INTRAVENOUS at 10:10

## 2025-02-12 RX ADMIN — Medication 400 MG: at 14:55

## 2025-02-12 RX ADMIN — PROPOFOL 50 MG: 10 INJECTION, EMULSION INTRAVENOUS at 10:20

## 2025-02-12 RX ADMIN — TIZANIDINE 4 MG: 4 TABLET ORAL at 14:55

## 2025-02-12 RX ADMIN — DEXAMETHASONE SODIUM PHOSPHATE 8 MG: 4 INJECTION, SOLUTION INTRAMUSCULAR; INTRAVENOUS at 09:50

## 2025-02-12 RX ADMIN — SODIUM CHLORIDE, SODIUM GLUCONATE, SODIUM ACETATE, POTASSIUM CHLORIDE AND MAGNESIUM CHLORIDE: 526; 502; 368; 37; 30 INJECTION, SOLUTION INTRAVENOUS at 09:38

## 2025-02-12 RX ADMIN — Medication 4 L/MIN: at 10:57

## 2025-02-12 RX ADMIN — MIDAZOLAM HYDROCHLORIDE 2 MG: 1 INJECTION, SOLUTION INTRAMUSCULAR; INTRAVENOUS at 09:47

## 2025-02-12 RX ADMIN — TIZANIDINE 8 MG: 4 TABLET ORAL at 20:59

## 2025-02-12 RX ADMIN — ENOXAPARIN SODIUM 40 MG: 40 INJECTION SUBCUTANEOUS at 14:55

## 2025-02-12 RX ADMIN — PROPOFOL 50 MG: 10 INJECTION, EMULSION INTRAVENOUS at 09:47

## 2025-02-12 RX ADMIN — PROPOFOL 50 MG: 10 INJECTION, EMULSION INTRAVENOUS at 09:56

## 2025-02-12 RX ADMIN — INSULIN LISPRO 4 UNITS: 100 INJECTION, SOLUTION INTRAVENOUS; SUBCUTANEOUS at 16:19

## 2025-02-12 RX ADMIN — PROPOFOL 150 MCG/KG/MIN: 10 INJECTION, EMULSION INTRAVENOUS at 09:46

## 2025-02-12 RX ADMIN — ESMOLOL HYDROCHLORIDE 50 MG: 10 INJECTION, SOLUTION INTRAVENOUS at 10:36

## 2025-02-12 RX ADMIN — ONDANSETRON 4 MG: 2 INJECTION, SOLUTION INTRAMUSCULAR; INTRAVENOUS at 09:50

## 2025-02-12 RX ADMIN — CLONIDINE HYDROCHLORIDE 0.3 MG: 0.2 TABLET ORAL at 14:55

## 2025-02-12 RX ADMIN — LATANOPROST 1 DROP: 50 SOLUTION/ DROPS OPHTHALMIC at 20:59

## 2025-02-12 SDOH — ECONOMIC STABILITY: INCOME INSECURITY: IN THE PAST 12 MONTHS HAS THE ELECTRIC, GAS, OIL, OR WATER COMPANY THREATENED TO SHUT OFF SERVICES IN YOUR HOME?: NO

## 2025-02-12 SDOH — SOCIAL STABILITY: SOCIAL INSECURITY: WITHIN THE LAST YEAR, HAVE YOU BEEN HUMILIATED OR EMOTIONALLY ABUSED IN OTHER WAYS BY YOUR PARTNER OR EX-PARTNER?: NO

## 2025-02-12 SDOH — SOCIAL STABILITY: SOCIAL INSECURITY: DO YOU FEEL UNSAFE GOING BACK TO THE PLACE WHERE YOU ARE LIVING?: NO

## 2025-02-12 SDOH — SOCIAL STABILITY: SOCIAL INSECURITY
WITHIN THE LAST YEAR, HAVE YOU BEEN KICKED, HIT, SLAPPED, OR OTHERWISE PHYSICALLY HURT BY YOUR PARTNER OR EX-PARTNER?: NO

## 2025-02-12 SDOH — SOCIAL STABILITY: SOCIAL INSECURITY
WITHIN THE LAST YEAR, HAVE YOU BEEN RAPED OR FORCED TO HAVE ANY KIND OF SEXUAL ACTIVITY BY YOUR PARTNER OR EX-PARTNER?: NO

## 2025-02-12 SDOH — ECONOMIC STABILITY: HOUSING INSECURITY: IN THE PAST 12 MONTHS, HOW MANY TIMES HAVE YOU MOVED WHERE YOU WERE LIVING?: 0

## 2025-02-12 SDOH — SOCIAL STABILITY: SOCIAL INSECURITY: WITHIN THE LAST YEAR, HAVE YOU BEEN AFRAID OF YOUR PARTNER OR EX-PARTNER?: NO

## 2025-02-12 SDOH — ECONOMIC STABILITY: HOUSING INSECURITY: AT ANY TIME IN THE PAST 12 MONTHS, WERE YOU HOMELESS OR LIVING IN A SHELTER (INCLUDING NOW)?: NO

## 2025-02-12 SDOH — SOCIAL STABILITY: SOCIAL INSECURITY: ARE YOU OR HAVE YOU BEEN THREATENED OR ABUSED PHYSICALLY, EMOTIONALLY, OR SEXUALLY BY ANYONE?: NO

## 2025-02-12 SDOH — ECONOMIC STABILITY: FOOD INSECURITY: WITHIN THE PAST 12 MONTHS, THE FOOD YOU BOUGHT JUST DIDN'T LAST AND YOU DIDN'T HAVE MONEY TO GET MORE.: NEVER TRUE

## 2025-02-12 SDOH — ECONOMIC STABILITY: HOUSING INSECURITY: IN THE LAST 12 MONTHS, WAS THERE A TIME WHEN YOU WERE NOT ABLE TO PAY THE MORTGAGE OR RENT ON TIME?: NO

## 2025-02-12 SDOH — HEALTH STABILITY: MENTAL HEALTH: CURRENT SMOKER: 0

## 2025-02-12 SDOH — SOCIAL STABILITY: SOCIAL INSECURITY: ARE THERE ANY APPARENT SIGNS OF INJURIES/BEHAVIORS THAT COULD BE RELATED TO ABUSE/NEGLECT?: NO

## 2025-02-12 SDOH — ECONOMIC STABILITY: FOOD INSECURITY: WITHIN THE PAST 12 MONTHS, YOU WORRIED THAT YOUR FOOD WOULD RUN OUT BEFORE YOU GOT THE MONEY TO BUY MORE.: NEVER TRUE

## 2025-02-12 SDOH — SOCIAL STABILITY: SOCIAL INSECURITY: HAVE YOU HAD ANY THOUGHTS OF HARMING ANYONE ELSE?: NO

## 2025-02-12 SDOH — SOCIAL STABILITY: SOCIAL INSECURITY: HAVE YOU HAD THOUGHTS OF HARMING ANYONE ELSE?: NO

## 2025-02-12 SDOH — SOCIAL STABILITY: SOCIAL INSECURITY: DO YOU FEEL ANYONE HAS EXPLOITED OR TAKEN ADVANTAGE OF YOU FINANCIALLY OR OF YOUR PERSONAL PROPERTY?: NO

## 2025-02-12 SDOH — ECONOMIC STABILITY: TRANSPORTATION INSECURITY: IN THE PAST 12 MONTHS, HAS LACK OF TRANSPORTATION KEPT YOU FROM MEDICAL APPOINTMENTS OR FROM GETTING MEDICATIONS?: NO

## 2025-02-12 SDOH — SOCIAL STABILITY: SOCIAL INSECURITY: ABUSE: ADULT

## 2025-02-12 SDOH — ECONOMIC STABILITY: FOOD INSECURITY: HOW HARD IS IT FOR YOU TO PAY FOR THE VERY BASICS LIKE FOOD, HOUSING, MEDICAL CARE, AND HEATING?: NOT HARD AT ALL

## 2025-02-12 SDOH — SOCIAL STABILITY: SOCIAL INSECURITY: HAS ANYONE EVER THREATENED TO HURT YOUR FAMILY OR YOUR PETS?: NO

## 2025-02-12 SDOH — SOCIAL STABILITY: SOCIAL INSECURITY: DOES ANYONE TRY TO KEEP YOU FROM HAVING/CONTACTING OTHER FRIENDS OR DOING THINGS OUTSIDE YOUR HOME?: NO

## 2025-02-12 SDOH — ECONOMIC STABILITY: HOUSING INSECURITY: DO YOU FEEL UNSAFE GOING BACK TO THE PLACE WHERE YOU LIVE?: NO

## 2025-02-12 SDOH — SOCIAL STABILITY: SOCIAL INSECURITY: WERE YOU ABLE TO COMPLETE ALL THE BEHAVIORAL HEALTH SCREENINGS?: YES

## 2025-02-12 ASSESSMENT — ACTIVITIES OF DAILY LIVING (ADL)
LACK_OF_TRANSPORTATION: NO
HEARING - LEFT EAR: FUNCTIONAL
PATIENT'S MEMORY ADEQUATE TO SAFELY COMPLETE DAILY ACTIVITIES?: YES
BATHING: NEEDS ASSISTANCE
ADEQUATE_TO_COMPLETE_ADL: YES
HEARING - RIGHT EAR: FUNCTIONAL
TOILETING: INDEPENDENT
WALKS IN HOME: INDEPENDENT
LACK_OF_TRANSPORTATION: NO
FEEDING YOURSELF: INDEPENDENT
ASSISTIVE_DEVICE: EYEGLASSES;DENTURES PARTIAL;WALKER
JUDGMENT_ADEQUATE_SAFELY_COMPLETE_DAILY_ACTIVITIES: YES
GROOMING: NEEDS ASSISTANCE
DRESSING YOURSELF: NEEDS ASSISTANCE

## 2025-02-12 ASSESSMENT — LIFESTYLE VARIABLES
HOW OFTEN DO YOU HAVE A DRINK CONTAINING ALCOHOL: 4 OR MORE TIMES A WEEK
AUDIT TOTAL SCORE: 0
HAS A RELATIVE, FRIEND, DOCTOR, OR ANOTHER HEALTH PROFESSIONAL EXPRESSED CONCERN ABOUT YOUR DRINKING OR SUGGESTED YOU CUT DOWN: NO
AUDIT-C TOTAL SCORE: 4
HOW MANY STANDARD DRINKS CONTAINING ALCOHOL DO YOU HAVE ON A TYPICAL DAY: 1 OR 2
HOW OFTEN DURING THE LAST YEAR HAVE YOU BEEN UNABLE TO REMEMBER WHAT HAPPENED THE NIGHT BEFORE BECAUSE YOU HAD BEEN DRINKING: NEVER
HOW OFTEN DURING THE LAST YEAR HAVE YOU FOUND THAT YOU WERE NOT ABLE TO STOP DRINKING ONCE YOU HAD STARTED: NEVER
HOW OFTEN DURING THE LAST YEAR HAVE YOU FAILED TO DO WHAT WAS NORMALLY EXPECTED FROM YOU BECAUSE OF DRINKING: NEVER
SUBSTANCE_ABUSE_PAST_12_MONTHS: YES
PRESCIPTION_ABUSE_PAST_12_MONTHS: NO
HOW OFTEN DURING THE LAST YEAR HAVE YOU NEEDED AN ALCOHOLIC DRINK FIRST THING IN THE MORNING TO GET YOURSELF GOING AFTER A NIGHT OF HEAVY DRINKING: NEVER
AUDIT TOTAL SCORE: 4
AUDIT-C TOTAL SCORE: 4
HOW OFTEN DURING THE LAST YEAR HAVE YOU HAD A FEELING OF GUILT OR REMORSE AFTER DRINKING: NEVER
SKIP TO QUESTIONS 9-10: 1
HAVE YOU OR SOMEONE ELSE BEEN INJURED AS A RESULT OF YOUR DRINKING: NO
HOW OFTEN DO YOU HAVE 6 OR MORE DRINKS ON ONE OCCASION: NEVER

## 2025-02-12 ASSESSMENT — COGNITIVE AND FUNCTIONAL STATUS - GENERAL
MOVING TO AND FROM BED TO CHAIR: A LITTLE
MOVING FROM LYING ON BACK TO SITTING ON SIDE OF FLAT BED WITH BEDRAILS: A LITTLE
DAILY ACTIVITIY SCORE: 22
MOBILITY SCORE: 14
MOVING FROM LYING ON BACK TO SITTING ON SIDE OF FLAT BED WITH BEDRAILS: A LITTLE
STANDING UP FROM CHAIR USING ARMS: A LOT
WALKING IN HOSPITAL ROOM: A LITTLE
DAILY ACTIVITIY SCORE: 22
STANDING UP FROM CHAIR USING ARMS: A LITTLE
DRESSING REGULAR LOWER BODY CLOTHING: A LITTLE
MOBILITY SCORE: 18
MOVING TO AND FROM BED TO CHAIR: A LOT
CLIMB 3 TO 5 STEPS WITH RAILING: A LITTLE
WALKING IN HOSPITAL ROOM: A LOT
HELP NEEDED FOR BATHING: A LITTLE
TURNING FROM BACK TO SIDE WHILE IN FLAT BAD: A LITTLE
CLIMB 3 TO 5 STEPS WITH RAILING: A LOT
PATIENT BASELINE BEDBOUND: NO
TURNING FROM BACK TO SIDE WHILE IN FLAT BAD: A LITTLE
DRESSING REGULAR LOWER BODY CLOTHING: A LITTLE
HELP NEEDED FOR BATHING: A LITTLE

## 2025-02-12 ASSESSMENT — PAIN SCALES - GENERAL
PAINLEVEL_OUTOF10: 0 - NO PAIN
PAINLEVEL_OUTOF10: 4
PAINLEVEL_OUTOF10: 0 - NO PAIN

## 2025-02-12 ASSESSMENT — PAIN - FUNCTIONAL ASSESSMENT
PAIN_FUNCTIONAL_ASSESSMENT: 0-10
PAIN_FUNCTIONAL_ASSESSMENT: 0-10
PAIN_FUNCTIONAL_ASSESSMENT: UNABLE TO SELF-REPORT
PAIN_FUNCTIONAL_ASSESSMENT: UNABLE TO SELF-REPORT
PAIN_FUNCTIONAL_ASSESSMENT: 0-10
PAIN_FUNCTIONAL_ASSESSMENT: 0-10
PAIN_FUNCTIONAL_ASSESSMENT: UNABLE TO SELF-REPORT
PAIN_FUNCTIONAL_ASSESSMENT: 0-10
PAIN_FUNCTIONAL_ASSESSMENT: UNABLE TO SELF-REPORT
PAIN_FUNCTIONAL_ASSESSMENT: 0-10
PAIN_FUNCTIONAL_ASSESSMENT: UNABLE TO SELF-REPORT

## 2025-02-12 ASSESSMENT — COLUMBIA-SUICIDE SEVERITY RATING SCALE - C-SSRS
2. HAVE YOU ACTUALLY HAD ANY THOUGHTS OF KILLING YOURSELF?: NO
6. HAVE YOU EVER DONE ANYTHING, STARTED TO DO ANYTHING, OR PREPARED TO DO ANYTHING TO END YOUR LIFE?: NO
1. IN THE PAST MONTH, HAVE YOU WISHED YOU WERE DEAD OR WISHED YOU COULD GO TO SLEEP AND NOT WAKE UP?: NO

## 2025-02-12 ASSESSMENT — PAIN DESCRIPTION - DESCRIPTORS: DESCRIPTORS: ACHING

## 2025-02-12 ASSESSMENT — PATIENT HEALTH QUESTIONNAIRE - PHQ9
2. FEELING DOWN, DEPRESSED OR HOPELESS: NOT AT ALL
1. LITTLE INTEREST OR PLEASURE IN DOING THINGS: NOT AT ALL
SUM OF ALL RESPONSES TO PHQ9 QUESTIONS 1 & 2: 0

## 2025-02-12 NOTE — CARE PLAN
The patient's goals for the shift include      The clinical goals for the shift include patient to remain safe throughout this shift      Problem: Pain - Adult  Goal: Verbalizes/displays adequate comfort level or baseline comfort level  Outcome: Met     Problem: Safety - Adult  Goal: Free from fall injury  Outcome: Met     Problem: Discharge Planning  Goal: Discharge to home or other facility with appropriate resources  Outcome: Progressing

## 2025-02-12 NOTE — Clinical Note
Procedure: continence control stimulator insertion   Anesthesia type (i.e. general, regional, MAC): MAC  Nerve block (if applicable): n/a  Estimated blood loss (EBL) in OR: 3 mL  Orientation/mental status: A&O x4- agitated   IV site(s), and drips/fluids currently infusin R wrist LR @ 100  PO status (last oral intake):  O2 requirements: none   Current pain level & last pain/nausea medication dose/time given: none   Last antibiotic @ 0846  Last tranexamic acid dose given @ n/a  Last void/A[place]il[place] in place:  no void in pacu- DTV by 1700  Equipment sent with patient (i.e. Polar Cube, TENs unit, walker, crutches): none   SCDs on (yes/no): yes  Belongings sent with patient: yes  Emergency contact (name, relationship, phone number): Gabe (friend) [phone]     Patient has a history of waking up violent from anesthesia. Pt woke up agitated and was given 4mg of versed. Pt currently resting comfortably. We spoke with nursing supervisor to get him a sitter once on the floor.     PMH: DM2 ( in pacu), NEVIN- no cpap, legally blind R eye, HTN, OAB, chronic bronchitis, asthma, lung nodule, thoracic aortic aneurysm

## 2025-02-12 NOTE — ANESTHESIA PREPROCEDURE EVALUATION
"Patient: Elliot Larson    Procedure Information       Date/Time: 02/12/25 6822    Procedure: Insertion Continence Control Stimulator Sacral Lead ~ Stage 1 (Urethra)    Location: [facility] A OR 01 / [facility] OR    Surgeons: Dick Gant MD            Relevant Problems   Cardiac   (+) Abnormal EKG   (+) Hypertension   (+) PAD (peripheral artery disease) (CMS-HCC)      Pulmonary   (+) Asthma      Neuro   (+) Depression      GI   (+) GERD (gastroesophageal reflux disease)      /Renal   (+) Enlarged prostate with lower urinary tract symptoms (LUTS)      Endocrine   (+) Diabetic nephropathy (Multi)      Musculoskeletal   (+) Disc degeneration, lumbar   (+) Osteoarthritis   (+) Primary osteoarthritis of first carpometacarpal joint of right hand      ID   (+) Onychomycosis   (+) Tinea pedis of both feet       Clinical information reviewed:    Allergies                 Past Medical History:   Diagnosis Date   • Adverse effect of anesthesia     After hip surgery \"took 3 days to come off breathing machine\". emergence deliruim- becomes very violent   • Anemia     11/21/24 H/h  13.7/39.1 Follows with heme/onc Manny Chandra, APRN-CNP LOV 5/1/24   • Asthma     last exacerbation in Dec with URI- alb neb used   • Chronic bronchitis (Multi)    • Closed fracture dislocation of cervical spine     fusion   • Delayed emergence from general anesthesia    • Depression    • Dyslipidemia    • Elevated coronary artery calcium score     CT 1/26/24  272.59   • Enlarged prostate with lower urinary tract symptoms (LUTS)    • GERD (gastroesophageal reflux disease)     under control   • Hepatitis B carrier (Multi)    • History of cardiovascular stress test 05/22/2024    normal   • History of central retinal vein occlusion (CMS-[facility]) 2000    right eye   • Hypertension    • Legally blind     right eye   • Lung nodule     CT 4/17/24 No suspicious pulmonary nodules identified.   • OAB (overactive bladder)    • Osteoarthritis    • " Postlaminectomy syndrome, lumbar    • Thoracic ascending aortic aneurysm (CMS-HCC)     CT 24 3.9 cm and unchanged from 2018   • Type 2 diabetes mellitus     24 A1c 7.6   • Urge incontinence    • Urinary tract infection       Past Surgical History:   Procedure Laterality Date   • CATARACT EXTRACTION Bilateral    • CERVICAL LAMINECTOMY     • COLONOSCOPY     • GASTRIC BYPASS      Gastric Surgery For Morbid Obesity Gastric Bypass   • HERNIA REPAIR Left     Inguinal Hernia Repair x 2   • LUMBAR FUSION      Lumbar Vertebral Fusion   • LUMBAR LAMINECTOMY     • MR HEAD ANGIO WO IV CONTRAST  2021    MR HEAD ANGIO WO IV CONTRAST 2021 [facility]   • ORIF HIP FRACTURE Left 2023    ORIF HIP PROXIMAL END, NECK, SLIDING HIP SCREW   • OTHER SURGICAL HISTORY Left 2022    REVISION JOINT TOTAL KNEE FEMORAL AND ENTIRE TIBIAL COMPONENT   • PENILE PROSTHESIS IMPLANT     • SEPTOPLASTY     • TOTAL KNEE ARTHROPLASTY Bilateral    • VITRECTOMY       Social History     Tobacco Use   • Smoking status: Former     Current packs/day: 0.00     Average packs/day: 2.0 packs/day for 27.0 years (54.0 ttl pk-yrs)     Types: Cigarettes     Start date:      Quit date:      Years since quittin.1     Passive exposure: Past   • Smokeless tobacco: Never   Vaping Use   • Vaping status: Never Used   Substance Use Topics   • Alcohol use: Yes     Alcohol/week: 14.0 standard drinks of alcohol     Types: 14 Glasses of wine per week     Comment: 2 glasses/day   • Drug use: Yes     Types: Marijuana     Comment: smokes daily, pipe      Current Outpatient Medications   Medication Instructions   • acetaminophen (TYLENOL) 1,000 mg, Every 8 hours PRN   • acyclovir (ZOVIRAX) 800 mg, oral, Daily   • albuterol (ProAir HFA) 90 mcg/actuation inhaler 2 puffs, inhalation, Every 4 hours PRN   • Autolet lancing device Use as instructed   • Blood glucose monitoring meter kit (OneTouch Ultra2 Meter) kit 1 each, miscellaneous, As  needed, Check sugar every morning; dispense onetouch ultra meter   • chlorhexidine (Peridex) 0.12 % solution 15 ml swish and spit for 30 seconds night prior to surgery and morning of surgery   • chlorhexidine (Peridex) 0.12 % solution 15 mL, Mouth/Throat, Daily   • cholecalciferol (Vitamin D-3) 50 MCG (2000 UT) tablet 2 tablets, 2 times daily   • cloNIDine (CATAPRES) 0.3 mg, oral, 2 times daily   • clotrimazole (Lotrimin) 1 % cream Topical, 2 times daily, apply to affected area   • cyanocobalamin (VITAMIN B-12) 500 mcg, Daily RT   • dapagliflozin propanediol (FARXIGA) 10 mg, oral, Daily   • latanoprost (Xalatan) 0.005 % ophthalmic solution 1 drop, Both Eyes, Nightly   • lisinopril 40 mg, oral, Daily   • magnesium oxide (MAG-OX) 400 mg, Daily   • melatonin 3 mg tablet 1 tablet, Nightly   • metformin HCl (METFORMIN ORAL) 4 tablets, Daily   • montelukast (SINGULAIR) 10 mg, oral, Daily   • OneTouch Ultra Test strip Check sugar every morning   • oxybutynin XL (Ditropan-XL) 10 mg 24 hr tablet TAKE 1 TABLET EVERY DAY   • pantoprazole (PROTONIX) 20 mg, oral, Daily   • rosuvastatin (CRESTOR) 10 mg, oral, Daily   • sodium,potassium,mag sulfates (Suprep) 17.5-3.13-1.6 gram recon soln solution Take one bottle twice as directed by the prep instructions   • tiZANidine (ZANAFLEX) 4 mg, oral, 3 times daily   • walker (Ultra-Light Rollator) misc Use for ambulation      Allergies   Allergen Reactions   • Ciprofloxacin Itching   • Ketorolac Rash   • Latex Itching   • Penicillins Other     Skin sloughing        Chemistry    Lab Results   Component Value Date/Time     02/10/2025 1311    K 4.3 02/10/2025 1311     02/10/2025 1311    CO2 27 02/10/2025 1311    BUN 18 02/10/2025 1311    CREATININE 0.83 02/10/2025 1311    Lab Results   Component Value Date/Time    CALCIUM 9.2 02/10/2025 1311    ALKPHOS 83 11/21/2024 1547    AST 20 11/21/2024 1547    ALT 19 11/21/2024 1547    BILITOT 1.3 (H) 11/21/2024 1547          Lab Results  "  Component Value Date    HGBA1C 6.2 (H) 02/06/2025     Lab Results   Component Value Date/Time    WBC 4.6 02/10/2025 1311    HGB 13.8 02/10/2025 1311    HCT 38.6 (L) 02/10/2025 1311     02/10/2025 1311     Lab Results   Component Value Date/Time    PROTIME 12.1 09/19/2024 0646    INR 1.1 09/19/2024 0646     No results found for: \"ABORH\"  Encounter Date: 09/24/24   ECG 12 lead (Clinic Performed)   Result Value    Ventricular Rate 79    Atrial Rate 79    HI Interval 196    QRS Duration 88    QT Interval 402    QTC Calculation(Bazett) 460    P Axis 76    R Axis 44    T Axis 63    QRS Count 13    Q Onset 215    P Onset 117    P Offset 182    T Offset 416    QTC Fredericia 440    Narrative    Normal sinus rhythm  Prolonged QT  Abnormal ECG  When compared with ECG of 22-MAY-2024 13:21,  T wave inversion no longer evident in Inferior leads  Nonspecific T wave abnormality, improved in Lateral leads  Confirmed by Vipul Fuentes ([id number]) on 9/25/2024 8:47:17 AM     No results found for this or any previous visit from the past 1095 days.       Visit Vitals  Smoking Status Former     No data recorded    Physical Exam    Airway  Mallampati: II  TM distance: >3 FB  Neck ROM: full     Cardiovascular - normal exam     Dental - normal exam     Pulmonary - normal exam     Abdominal - normal exam         Anesthesia Plan    History of general anesthesia?: yes  History of complications of general anesthesia?: yes    ASA 2   (Prolonged time on ventilator)  The patient is not a current smoker.    intravenous induction   Postoperative administration of opioids is intended.  Anesthetic plan and risks discussed with patient.  Use of blood products discussed with patient who.    Plan discussed with CAA and attending.    "

## 2025-02-12 NOTE — DISCHARGE INSTRUCTIONS
DEPARTMENT OF Urology  DISCHARGE INSTRUCTIONS -- Incontinence Stimulator (Axonics or Interstim)  Outpatient Surgery     C O N F I D E N T I A L   I N F O R M A T I O N     Call Urology Department [phone] during regular daytime business hours (8:00 am - 5:00 pm) and after 5:00 pm ask for the Urology resident with any questions or concerns.       If it is a life-threatening situation, proceed to the nearest emergency department.         Thank you for the opportunity to care for you today.  Your health and healing are very important to us.  We hope we made you feel as comfortable as possible and are committed to your recovery and continued well-being.       The following is a brief overview of your procedure. Some of the information contained on this summary may be confidential.  This information should be kept in your records and should be shared with your regular doctor.     Procedure performed: Incontinence stimulator     What to Expect During your Recovery and Home Care  Anesthesia Side Effects   You received anesthesia today.  You may feel sleepy, tired, or have a sore throat.   You may also feel drowsiness, dizziness, or inability to think clearly.  For your safety, do not drive, drink alcoholic beverages, take any unprescribed medication or make any important decisions for 24 hours.  A responsible adult should be with you for 24 hours.       Activity and Recovery    Go home and rest. No strenuous activity and no heavy lifting over 10 lbs.      Pain Control  Unfortunately, you may experience pain after your procedure.  Adequate management can include alternative measures to help ease your pain and can include ice packs, scrotal support, and over the counter Tylenol. Tramadol may also be prescribe for breakthrough pain. Do not take more than 4,000 mg of Tylenol in a 24-hour period.      Tramadol is a narcotic medication, which can impair judgment, slow reaction times, and cause constipation. Take Miralax,  Colace, or other stool softeners for constipation. Do not drive or operate machinery while taking narcotic medications.     Nausea/Vomiting   Clear liquids are best tolerated at first. Start slow, advance your diet as tolerated to normal foods. Avoid spicy, greasy, heavy foods at first. Also, you may feel nauseous or like you need to vomit if you take any type of medication on an empty stomach.  Call your physician if you are unable to eat or drink and have persistent vomiting.           Signs of Bleeding   Minor bleeding or drainage may occur from the surgical site; however, excessive or consistent bleeding should be reported to your surgeon. Excessive bleeding is defined as blood that is dripping from wound, soaking you bandages, and is ketchup colored, thick with possible blood clots.  Consistent is defined as bleeding that does not stop.     Treatment/wound care:   Keep area(s) clean and dry.   Please visually inspect your wound(s) at least once daily.  If the wound(s) are in a difficult to see location, please use a mirror or have someone else assist with visual inspection.     Signs of Infection  Signs of infection can include fever, excessive swelling, heat, drainage, redness, or severe pain.  If you see any of these occur, please contact [phone]. Any fever higher than 100.4, especially if associated with an ill feeling, abdominal pain, chills, or nausea should be reported to your surgeon.

## 2025-02-12 NOTE — OP NOTE
Insertion Continence Control Stimulator Sacral Lead ~ Stage 1 Operative Note     Date: 2025  OR Location: U A OR    Name: Elliot Larson, : 1950, Age: 74 y.o., MRN: [id number], Sex: male    Diagnosis  Pre-op Diagnosis      * Urge incontinence [N39.41] Post-op Diagnosis     * Urge incontinence [N39.41]     Procedures  Insertion Continence Control Stimulator Sacral Lead ~ Stage 1  84651 - ME PRQ IMPLTJ NEUROSTIM ELTRD SACRAL NRVE W/IMAGING      Surgeons      * Dick Gant - Primary    Resident/Fellow/Other Assistant:  Surgeons and Role:     * Ney Garcia MD - Resident - Assisting    Staff:   Circulator: Linda Nieto Person: Renita    Anesthesia Staff: Anesthesiologist: Gomez Rucker MD  C-AA: ISAIAS Serrano    Procedure Summary  Anesthesia: General  ASA: II  Estimated Blood Loss: 2mL  Intra-op Medications:   Administrations occurring from 0930 to 1045 on 25:   Medication Name Total Dose   lidocaine PF (Xylocaine) 10 mg/mL (1 %) injection 11 mL   dexAMETHasone (Decadron) injection 4 mg/mL 8 mg   electrolyte-A (Plasmalyte-A) Cannot be calculated   esmolol 10 mg/mL 100 mg   fentaNYL (Sublimaze) injection 50 mcg/mL 100 mcg   lidocaine (Xylocaine) 1 % 50 mg   midazolam PF (Versed) injection 1 mg/mL 2 mg   ondansetron (Zofran) 2 mg/mL injection 4 mg   propofol (Diprivan) injection 10 mg/mL 700.88 mg              Anesthesia Record               Intraprocedure I/O Totals          Intake    electrolyte-A (Plasmalyte-A) 500.00 mL    Total Intake 500 mL       Output    Urine 0 mL    Est. Blood Loss 3 mL    Total Output 3 mL       Net    Net Volume 497 mL          Specimen: No specimens collected              Drains and/or Catheters: * None in log *    Tourniquet Times:         Implants:  Implants       Type Name Action Serial No.      Neuro Interventional Implant KIT, TINED LEAD - [id number] - [id number] Implanted [id number]     Neuro Interventional Implant PERCUTANEOUS EXTENSION -  [id number] - [id number] Implanted [id number]              Findings: Placement of sacral neural stimulator leads on the right side with external device    Indications: Elliot Larson is an 74 y.o. male who is having surgery for Urge incontinence [N39.41].     The patient was seen in the preoperative area. The risks, benefits, complications, treatment options, non-operative alternatives, expected recovery and outcomes were discussed with the patient. The possibilities of reaction to medication, pulmonary aspiration, injury to surrounding structures, bleeding, recurrent infection, the need for additional procedures, failure to diagnose a condition, and creating a complication requiring transfusion or operation were discussed with the patient. The patient concurred with the proposed plan, giving informed consent.  The site of surgery was properly noted/marked if necessary per policy. The patient has been actively warmed in preoperative area. Preoperative antibiotics have been ordered and given within 1 hours of incision. Venous thrombosis prophylaxis have been ordered including bilateral sequential compression devices    Procedure Details:     The patient was taken to the operating room and placed under MAC anesthesia.  She was prepared and draped in normal sterile fashion and placed in the prone position. We identified our landmarks and located the approximate area of the S3 foramen 9cm from the coccyx.  We injected 1% lidocaine and attempted to place needle into the S3 foramen. Several attempts were required on the right and left side until we had success. We then tested for reflexes which were appropriate on the patient's right side with a good ashlie reflex. We then confirmed S3 placement on fluoroscopy.  At this time, we placed a stylet through the needle and removed the needle.  We made a small stab incision next to the stylet.  We placed the dilating sheath over the stylet and dilated the tract down to the  foramen.  We removed the stylet and then placed the curved lead.  The lead was placed with 3 electrodes outside of the foramen and one in the foramen.  It was tested, and we had good response on all of the electrodes.  The dilating sheath was then retracted to allow the yaritza of lead to be exposed, and we confirmed good placement.  Next, we injected 1% lidocaine with epinephrine laterally to the superior buttock area where the future pocket would be created.  We made a small 2 cm incision and obtained hemostasis at the incision.  We then tunneled the lead to this incision.  We placed the boot over top of the lead after cleaning and drying it. The connection wire was again tunneled to the patient's left side. We attached the external battery device.  We then closed the incision on the patient's right buttock with 3-0 Vicryl subcutaneous layer and then a 4-0 stratafix subcuticular layer.  This concluded the procedure.  All counts were correct.  The patient returned to the recovery room in stable condition.        Complications:  None; patient tolerated the procedure well.    Disposition: PACU - hemodynamically stable.  Condition: stable                 Additional Details: the patient will return to the OR in 2 weeks for placement of permanent stimulator    Attending Attestation:     Dick Gant  Phone Number: [phone]

## 2025-02-12 NOTE — ANESTHESIA POSTPROCEDURE EVALUATION
Patient: Elliot Larson    Procedure Summary       Date: 02/12/25 Room / Location: [facility] A OR 01 / Virtual U A OR    Anesthesia Start: 0938 Anesthesia Stop:     Procedure: Insertion Continence Control Stimulator Sacral Lead ~ Stage 1 (Back) Diagnosis:       Urge incontinence      (Urge incontinence [N39.41])    Surgeons: Dick Gant MD Responsible Provider: Gomez Rucker MD    Anesthesia Type: general ASA Status: 2            Anesthesia Type: general    Vitals Value Taken Time   /76 02/12/25 1057   Temp 37 02/12/25 1057   Pulse 98 02/12/25 1057   Resp 14 02/12/25 1057   SpO2 98 02/12/25 1057       Anesthesia Post Evaluation    Patient location during evaluation: PACU  Patient participation: complete - patient participated  Level of consciousness: awake  Pain management: adequate  Airway patency: patent  Cardiovascular status: acceptable  Respiratory status: acceptable  Hydration status: acceptable  Postoperative Nausea and Vomiting: none      No notable events documented.

## 2025-02-13 ENCOUNTER — APPOINTMENT (OUTPATIENT)
Dept: RADIOLOGY | Facility: HOSPITAL | Age: 75
End: 2025-02-13
Payer: MEDICARE

## 2025-02-13 VITALS
HEIGHT: 68 IN | SYSTOLIC BLOOD PRESSURE: 118 MMHG | OXYGEN SATURATION: 97 % | HEART RATE: 62 BPM | DIASTOLIC BLOOD PRESSURE: 64 MMHG | TEMPERATURE: 99 F | WEIGHT: 220.9 LBS | BODY MASS INDEX: 33.48 KG/M2 | RESPIRATION RATE: 18 BRPM

## 2025-02-13 LAB
GLUCOSE BLD MANUAL STRIP-MCNC: 155 MG/DL (ref 74–99)
GLUCOSE BLD MANUAL STRIP-MCNC: 222 MG/DL (ref 74–99)
GLUCOSE BLD MANUAL STRIP-MCNC: 276 MG/DL (ref 74–99)
GLUCOSE BLD MANUAL STRIP-MCNC: 428 MG/DL (ref 74–99)

## 2025-02-13 PROCEDURE — 96372 THER/PROPH/DIAG INJ SC/IM: CPT

## 2025-02-13 PROCEDURE — 2500000004 HC RX 250 GENERAL PHARMACY W/ HCPCS (ALT 636 FOR OP/ED)

## 2025-02-13 PROCEDURE — 2500000001 HC RX 250 WO HCPCS SELF ADMINISTERED DRUGS (ALT 637 FOR MEDICARE OP)

## 2025-02-13 PROCEDURE — 99238 HOSP IP/OBS DSCHRG MGMT 30/<: CPT

## 2025-02-13 PROCEDURE — 2500000002 HC RX 250 W HCPCS SELF ADMINISTERED DRUGS (ALT 637 FOR MEDICARE OP, ALT 636 FOR OP/ED)

## 2025-02-13 PROCEDURE — 82947 ASSAY GLUCOSE BLOOD QUANT: CPT

## 2025-02-13 PROCEDURE — 7100000011 HC EXTENDED STAY RECOVERY HOURLY - NURSING UNIT

## 2025-02-13 RX ORDER — INSULIN LISPRO 100 [IU]/ML
10 INJECTION, SOLUTION INTRAVENOUS; SUBCUTANEOUS ONCE
Status: COMPLETED | OUTPATIENT
Start: 2025-02-13 | End: 2025-02-13

## 2025-02-13 RX ORDER — INSULIN LISPRO 100 [IU]/ML
8 INJECTION, SOLUTION INTRAVENOUS; SUBCUTANEOUS ONCE
Status: DISCONTINUED | OUTPATIENT
Start: 2025-02-13 | End: 2025-02-13 | Stop reason: HOSPADM

## 2025-02-13 RX ADMIN — ENOXAPARIN SODIUM 40 MG: 40 INJECTION SUBCUTANEOUS at 15:08

## 2025-02-13 RX ADMIN — TIZANIDINE 4 MG: 4 TABLET ORAL at 15:08

## 2025-02-13 RX ADMIN — CLONIDINE HYDROCHLORIDE 0.3 MG: 0.2 TABLET ORAL at 08:40

## 2025-02-13 RX ADMIN — INSULIN LISPRO 4 UNITS: 100 INJECTION, SOLUTION INTRAVENOUS; SUBCUTANEOUS at 12:14

## 2025-02-13 RX ADMIN — TIZANIDINE 4 MG: 4 TABLET ORAL at 08:40

## 2025-02-13 RX ADMIN — LISINOPRIL 40 MG: 20 TABLET ORAL at 08:40

## 2025-02-13 RX ADMIN — INSULIN LISPRO 10 UNITS: 100 INJECTION, SOLUTION INTRAVENOUS; SUBCUTANEOUS at 00:19

## 2025-02-13 RX ADMIN — ROSUVASTATIN 10 MG: 10 TABLET, FILM COATED ORAL at 08:40

## 2025-02-13 RX ADMIN — ACYCLOVIR 800 MG: 400 TABLET ORAL at 08:40

## 2025-02-13 RX ADMIN — ACETAMINOPHEN 650 MG: 325 TABLET, FILM COATED ORAL at 03:14

## 2025-02-13 RX ADMIN — METFORMIN HYDROCHLORIDE 1000 MG: 500 TABLET ORAL at 08:40

## 2025-02-13 RX ADMIN — PANTOPRAZOLE SODIUM 20 MG: 20 TABLET, DELAYED RELEASE ORAL at 08:40

## 2025-02-13 RX ADMIN — DAPAGLIFLOZIN 10 MG: 10 TABLET, FILM COATED ORAL at 08:40

## 2025-02-13 RX ADMIN — Medication 400 MG: at 08:40

## 2025-02-13 RX ADMIN — INSULIN LISPRO 2 UNITS: 100 INJECTION, SOLUTION INTRAVENOUS; SUBCUTANEOUS at 08:41

## 2025-02-13 RX ADMIN — ACETAMINOPHEN 650 MG: 325 TABLET, FILM COATED ORAL at 08:40

## 2025-02-13 RX ADMIN — MONTELUKAST 10 MG: 10 TABLET, FILM COATED ORAL at 08:40

## 2025-02-13 ASSESSMENT — COGNITIVE AND FUNCTIONAL STATUS - GENERAL
WALKING IN HOSPITAL ROOM: A LITTLE
DAILY ACTIVITIY SCORE: 22
DRESSING REGULAR LOWER BODY CLOTHING: A LITTLE
HELP NEEDED FOR BATHING: A LITTLE
CLIMB 3 TO 5 STEPS WITH RAILING: A LOT
STANDING UP FROM CHAIR USING ARMS: A LITTLE
MOVING TO AND FROM BED TO CHAIR: A LITTLE
MOBILITY SCORE: 19

## 2025-02-13 ASSESSMENT — PAIN - FUNCTIONAL ASSESSMENT
PAIN_FUNCTIONAL_ASSESSMENT: 0-10
PAIN_FUNCTIONAL_ASSESSMENT: 0-10

## 2025-02-13 ASSESSMENT — ACTIVITIES OF DAILY LIVING (ADL)
LACK_OF_TRANSPORTATION: NO
LACK_OF_TRANSPORTATION: NO

## 2025-02-13 ASSESSMENT — PAIN DESCRIPTION - ORIENTATION: ORIENTATION: LOWER

## 2025-02-13 ASSESSMENT — PAIN SCALES - GENERAL
PAINLEVEL_OUTOF10: 4
PAINLEVEL_OUTOF10: 0 - NO PAIN

## 2025-02-13 ASSESSMENT — PAIN DESCRIPTION - LOCATION: LOCATION: BACK

## 2025-02-13 NOTE — POST-PROCEDURE NOTE
"74 year old male presenting for scheduled Insertion Continence Control Stimulator Sacral Lead ~ Stage 1 for management of urge incontinence. Placement of sacral neural stimulator leads on the right side with external device.    Patient alert this evening, voiding without difficulty, s/p large dinner with dessert.     /84 (BP Location: Left arm, Patient Position: Lying)   Pulse 97   Temp 37.1 °C (98.8 °F) (Temporal)   Resp 18   Ht 1.727 m (5' 8\")   Wt 100 kg (220 lb 14.4 oz)   SpO2 98%   BMI 33.59 kg/m²       PE:  Constitutional: A&Ox3, calm and cooperative, NAD  Eyes: PERRL, clear sclera   ENMT: Moist mucous membranes, no apparent injuries or lesions  Head/Neck: Neck supple, no JVD  Cardiovascular: Normal rate and regular rhythm. No murmurs, rubs, or gallops. 2+ equal pulses of the distal extremities.  Respiratory/Thorax: CTAB, No rales, rhonchi, or wheezes. Good symmetric chest expansion.   Gastrointestinal: Abdomen slightly distended, soft, appropriately tender, no peritoneal signs, laparotomy sites c/d/i, well approximate with Dermabond, no erythema or drainage    Genitourinary: Voiding independently   Musculoskeletal: ROM intact, no joint swelling, normal strength  Extremities: No peripheral edema, contusions, or wounds  Neurological: A&Ox3, No focal deficits   Psychological: Appropriate mood and behavior  Skin: Warm and dry, no rashes or lesions    Post op hyperglycemia   at 2000, given 8 units of Lispro, one hour recheck 494, additional 8 units given, one hour recheck 428, 10 units ordered, recheck ordered for 0300, will give additional 8 units if >300.  - increased ACHS ISS  - given 1000mg Metformin this evening --> to resume daily regimen in the AM   - ordered Farxiga to resume in the AM  - medicine consulted ordered for medical management   - Diet: switched to carb controlled diet  - Monitor for s/sx of hypo/hyperglycemia         - Continue current pain regimen   - PRN antiemetic   - DVT " Proph: SCDs/ ambulate/Lovenox   - Bowel regimen PRN  - Monitor VS every 4 hours   - Labs ordered for AM   - IS every hour while awake   - Encourage ambulation / OOB as tolerated

## 2025-02-13 NOTE — CARE PLAN
The patient's goals for the shift include      The clinical goals for the shift include pt to remain hds and    Over the shift, the patient did not make progress toward the following goals. Barriers to progression include n/a. Recommendations to address these barriers include n/a.

## 2025-02-13 NOTE — DISCHARGE SUMMARY
Discharge Diagnosis  Urge incontinence    Issues Requiring Follow-Up  Regular post-op visit  PCP, to discuss Blood sugar control    Test Results Pending At Discharge  Pending Labs       No current pending labs.            Hospital Course  74 yr old male with urge incontinence s/p Insertion Continence Control Stimulator Sacral Lead ~ Stage 1 on 2/12 with Dr. Gant. Please see operative report for full details. Patient tolerated the procedure well and recovered briefly in PACU before being transitioned to regular nursing floor. Post-op course was uncomplicated. Diet was advanced as tolerated.  IV medication transitioned to oral as diet advanced. On the day of discharge, the pt was tolerating a diet, pain was controlled on PO pain medication, and they were ambulating and voiding spontaneously. They were discharged to home in stable condition with instructions to follow up as outpatient.      Pertinent Physical Exam At Time of Discharge  Physical Exam  Constitutional:       Appearance: Normal appearance.   Cardiovascular:      Rate and Rhythm: Normal rate and regular rhythm.   Pulmonary:      Effort: Pulmonary effort is normal.      Comments: Non labored breathing on RA  Abdominal:      General: There is no distension.      Palpations: Abdomen is soft.   Genitourinary:     Comments: Voiding indepentdently  Musculoskeletal:      Comments: Lap sites C/D/I, edges well approximated, covered in Dermabond across the lower back. Never stimulator battery secured on his back with a tegaderm.   Skin:     General: Skin is warm and dry.   Neurological:      General: No focal deficit present.      Mental Status: He is alert and oriented to person, place, and time. Mental status is at baseline.   Psychiatric:         Attention and Perception: Attention normal.         Mood and Affect: Mood normal.         Speech: Speech normal.         Behavior: Behavior normal.         Cognition and Memory: Cognition normal.     /71   Pulse 59  "  Temp 37.2 °C (99 °F) (Temporal)   Resp 20   Ht 1.727 m (5' 8\")   Wt 100 kg (220 lb 14.4 oz)   SpO2 96%   BMI 33.59 kg/m²      Home Medications     Medication List      CONTINUE taking these medications     acetaminophen 500 mg tablet; Commonly known as: Tylenol   acyclovir 800 mg tablet; Commonly known as: Zovirax; TAKE 1 TABLET EVERY   DAY   Blood glucose monitoring meter; Commonly known as: OneTouch Ultra2   Meter; 1 each if needed (check sugar every morning;). Check sugar every   morning; dispense onetouch ultra meter   cholecalciferol 50 MCG (2000 UT) tablet; Commonly known as: Vitamin D-3   cyanocobalamin 500 mcg tablet; Commonly known as: Vitamin B-12   lisinopril 40 mg tablet; TAKE 1 TABLET EVERY DAY   magnesium oxide 400 mg tablet; Commonly known as: Mag-Ox   METFORMIN ORAL   montelukast 10 mg tablet; Commonly known as: Singulair; TAKE 1 TABLET   EVERY DAY   pantoprazole 20 mg EC tablet; Commonly known as: ProtoNix; TAKE 1 TABLET   EVERY DAY   rosuvastatin 10 mg tablet; Commonly known as: Crestor; TAKE 1 TABLET   EVERY DAY   tiZANidine 4 mg tablet; Commonly known as: Zanaflex; TAKE 1 TABLET THREE   TIMES DAILY   Ultra-Light Rollator misc; Generic drug: walker; Use for ambulation     STOP taking these medications     chlorhexidine 0.12 % solution; Commonly known as: Peridex   cloNIDine 0.3 mg tablet; Commonly known as: Catapres   clotrimazole 1 % cream; Commonly known as: Lotrimin   dapagliflozin propanediol 10 mg; Commonly known as: Farxiga   latanoprost 0.005 % ophthalmic solution; Commonly known as: Xalatan   melatonin 3 mg tablet   OneTouch Ultra Test strip; Generic drug: blood sugar diagnostic   oxybutynin XL 10 mg 24 hr tablet; Commonly known as: Ditropan-XL   sodium,potassium,mag sulfates 17.5-3.13-1.6 gram solution; Commonly   known as: Suprep       Outpatient Follow-Up  Future Appointments   Date Time Provider Department Center   2/20/2025  2:10 PM Dick Gant MD [facility]   3/21/2025  " 3:30 PM Gloria Boone MD [id number] [place]   4/2/2025  3:00 PM Sunny Moraes MD [id number] [facility]   5/23/2025  2:30 PM Manny Chandra, APRN-[facility] [id number] [place]       DEVIN MasseyC

## 2025-02-13 NOTE — ANESTHESIA POSTPROCEDURE EVALUATION
Patient: Elliot Larson    Procedure Summary       Date: 02/12/25 Room / Location: [facility] A OR 01 / Virtual U A OR    Anesthesia Start: 0938 Anesthesia Stop: 1102    Procedure: Insertion Continence Control Stimulator Sacral Lead ~ Stage 1 (Back) Diagnosis:       Urge incontinence      (Urge incontinence [N39.41])    Surgeons: Dick Gant MD Responsible Provider: Gomez Rucker MD    Anesthesia Type: general ASA Status: 2            Anesthesia Type: general    Vitals Value Taken Time   /83 02/12/25 1335   Temp 36.5 °C (97.7 °F) 02/12/25 1330   Pulse 97 02/12/25 1334   Resp 18 02/12/25 1330   SpO2 100 % 02/12/25 1335   Vitals shown include unfiled device data.    Anesthesia Post Evaluation    Patient location during evaluation: PACU  Patient participation: complete - patient participated  Level of consciousness: awake  Pain management: adequate  Airway patency: patent  Cardiovascular status: acceptable  Respiratory status: acceptable  Hydration status: acceptable  Postoperative Nausea and Vomiting: none  Comments: Pt arrived in PACU very aggitated and thus was given a total of 4 mg versed in divided doses of 2 mg each. One syringe of ketamine was taken out for Mr Larson but it was not necessary and thus it was wasted into the RX Destroyer with Nurse Fermin.    No notable events documented.

## 2025-02-13 NOTE — PROGRESS NOTES
02/13/25 1123   [facility] Disability Status   Are you deaf or do you have serious difficulty hearing? N   Are you blind or do you have serious difficulty seeing, even when wearing glasses? N   Because of a physical, mental, or emotional condition, do you have serious difficulty concentrating, remembering, or making decisions? (5 years old or older) N   Do you have serious difficulty walking or climbing stairs? Y   Do you have serious difficulty dressing or bathing? N   Because of a physical, mental, or emotional condition, do you have serious difficulty doing errands alone such as visiting the doctor? N

## 2025-02-13 NOTE — PROGRESS NOTES
02/13/25 1124   Discharge Planning   Living Arrangements Alone   Support Systems Friends/neighbors   Assistance Needed PTA; mod Independent w/ ADL's, has/use a rollator,   Type of Residence Private residence  (demo correct)   Number of Stairs to Enter Residence 0   Number of Stairs Within Residence 0   Do you have animals or pets at home? Yes   Type of Animals or Pets dog   Home or Post Acute Services None  (patient declined [facility])   Expected Discharge Disposition Home  (patient denies any further needs post discharge)   Does the patient need discharge transport arranged? No  (friend will provide transport home)   Financial Resource Strain   How hard is it for you to pay for the very basics like food, housing, medical care, and heating? Not very   Housing Stability   In the last 12 months, was there a time when you were not able to pay the mortgage or rent on time? N   In the past 12 months, how many times have you moved where you were living? 0   At any time in the past 12 months, were you homeless or living in a shelter (including now)? N   Transportation Needs   In the past 12 months, has lack of transportation kept you from medical appointments or from getting medications? no  (PCP listed; patient drives self to appointments, last office visit this week)   In the past 12 months, has lack of transportation kept you from meetings, work, or from getting things needed for daily living? No   Stroke Family Assessment   Stroke Family Assessment Needed No   Intensity of Service   Intensity of Service 0-30 min     Care Coordinator Note:  Met patient at bedside to discuss discharge planning, explained my role as care coordinator   Plan: Procedure: Insertion Continence Control Stimulator Sacral Lead ~ 2/12/2025  Status: Extended Recovery   Payor: Medicare   Disposition: Home; patient denies any further needs post discharge    Monika IRWIN, RN TCC

## 2025-02-13 NOTE — CARE PLAN
The patient's goals for the shift include      The clinical goals for the shift include pt to remain hds      Problem: Discharge Planning  Goal: Discharge to home or other facility with appropriate resources  Outcome: Met     Problem: Chronic Conditions and Co-morbidities  Goal: Patient's chronic conditions and co-morbidity symptoms are monitored and maintained or improved  Outcome: Met     Problem: Nutrition  Goal: Nutrient intake appropriate for maintaining nutritional needs  Outcome: Met     Problem: Diabetes  Goal: Achieve decreasing blood glucose levels by end of shift  Outcome: Met  Goal: Increase stability of blood glucose readings by end of shift  Outcome: Met  Goal: Decrease in ketones present in urine by end of shift  Outcome: Met  Goal: Maintain electrolyte levels within acceptable range throughout shift  Outcome: Met  Goal: Maintain glucose levels >70mg/dl to <250mg/dl throughout shift  Outcome: Met  Goal: No changes in neurological exam by end of shift  Outcome: Met  Goal: Learn about and adhere to nutrition recommendations by end of shift  Outcome: Met  Goal: Vital signs within normal range for age by end of shift  Outcome: Met  Goal: Increase self care and/or family involovement by end of shift  Outcome: Met  Goal: Receive DSME education by end of shift  Outcome: Met

## 2025-02-13 NOTE — PROGRESS NOTES
Care Coordinator Note:  TCC spoke with patient regarding dc planning. Demo correct. Lives home alone independently. Uses rollator, has had recent falls. PCP is haleigh Corbett. Seen this past week. Pharm is [facility] [address]. No ac prior to admission.     Plan: Patient s/p insertion of continence control stimulator.   Status: Ext rec  Payor: Medicare   Disposition: Home alone. No needs   Barrier: none  ADOD: today    Stacie Claire TCC      02/13/25 1129   Discharge Planning   Living Arrangements Alone   Support Systems Family members   Assistance Needed IND with ADL   Type of Residence Private residence   Number of Stairs to Enter Residence 0   Number of Stairs Within Residence 0   Do you have animals or pets at home? Yes   Type of Animals or Pets 1 dog   Who is requesting discharge planning? Provider   Home or Post Acute Services None   Expected Discharge Disposition Home   Does the patient need discharge transport arranged? Yes  (Friend will be ride at DC.)   RoundTrip coordination needed? Yes   Has discharge transport been arranged? No   Financial Resource Strain   How hard is it for you to pay for the very basics like food, housing, medical care, and heating? Not hard   Housing Stability   In the last 12 months, was there a time when you were not able to pay the mortgage or rent on time? N   At any time in the past 12 months, were you homeless or living in a shelter (including now)? N   Transportation Needs   In the past 12 months, has lack of transportation kept you from medical appointments or from getting medications? no   In the past 12 months, has lack of transportation kept you from meetings, work, or from getting things needed for daily living? No   Intensity of Service   Intensity of Service 0-30 min

## 2025-02-14 ENCOUNTER — TELEPHONE (OUTPATIENT)
Dept: PRIMARY CARE | Facility: CLINIC | Age: 75
End: 2025-02-14
Payer: MEDICARE

## 2025-02-14 RX ORDER — DAPAGLIFLOZIN 10 MG/1
10 TABLET, FILM COATED ORAL DAILY
Qty: 90 TABLET | Refills: 0 | Status: SHIPPED | OUTPATIENT
Start: 2025-02-14 | End: 2026-03-21

## 2025-02-14 NOTE — TELEPHONE ENCOUNTER
I informed Pt.that his [place] application for [facility] was denied. 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 said try sending it through to [facility].

## 2025-02-14 NOTE — TELEPHONE ENCOUNTER
I informed Pt.that Dr. Corbett sent the clotrimazole cream to [facility]. Pt. verbalizes understanding of the message given.

## 2025-02-15 ENCOUNTER — APPOINTMENT (OUTPATIENT)
Dept: RADIOLOGY | Facility: HOSPITAL | Age: 75
End: 2025-02-15
Payer: MEDICARE

## 2025-02-17 ENCOUNTER — APPOINTMENT (OUTPATIENT)
Dept: PRIMARY CARE | Facility: CLINIC | Age: 75
End: 2025-02-17
Payer: MEDICARE

## 2025-02-17 DIAGNOSIS — M51.369 DISC DEGENERATION, LUMBAR: ICD-10-CM

## 2025-02-17 DIAGNOSIS — Z00.00 HEALTHCARE MAINTENANCE: ICD-10-CM

## 2025-02-17 DIAGNOSIS — J45.909 ASTHMA, UNSPECIFIED ASTHMA SEVERITY, UNSPECIFIED WHETHER COMPLICATED, UNSPECIFIED WHETHER PERSISTENT (HHS-HCC): ICD-10-CM

## 2025-02-18 ENCOUNTER — APPOINTMENT (OUTPATIENT)
Dept: PREADMISSION TESTING | Facility: HOSPITAL | Age: 75
End: 2025-02-18
Payer: MEDICARE

## 2025-02-18 RX ORDER — ACYCLOVIR 800 MG/1
800 TABLET ORAL DAILY
Qty: 90 TABLET | Refills: 1 | Status: SHIPPED | OUTPATIENT
Start: 2025-02-18

## 2025-02-18 RX ORDER — TIZANIDINE 4 MG/1
4 TABLET ORAL 3 TIMES DAILY
Qty: 270 TABLET | Refills: 1 | Status: SHIPPED | OUTPATIENT
Start: 2025-02-18

## 2025-02-18 RX ORDER — MONTELUKAST SODIUM 10 MG/1
10 TABLET ORAL DAILY
Qty: 90 TABLET | Refills: 1 | Status: SHIPPED | OUTPATIENT
Start: 2025-02-18

## 2025-02-20 ENCOUNTER — APPOINTMENT (OUTPATIENT)
Dept: UROLOGY | Facility: HOSPITAL | Age: 75
End: 2025-02-20
Payer: MEDICARE

## 2025-02-20 ENCOUNTER — TELEMEDICINE (OUTPATIENT)
Dept: UROLOGY | Facility: HOSPITAL | Age: 75
End: 2025-02-20
Payer: MEDICARE

## 2025-02-20 ENCOUNTER — PREP FOR PROCEDURE (OUTPATIENT)
Dept: UROLOGY | Facility: HOSPITAL | Age: 75
End: 2025-02-20
Payer: MEDICARE

## 2025-02-20 DIAGNOSIS — N39.41 URGE INCONTINENCE: Primary | ICD-10-CM

## 2025-02-20 PROCEDURE — 99024 POSTOP FOLLOW-UP VISIT: CPT | Performed by: UROLOGY

## 2025-02-20 PROCEDURE — 3044F HG A1C LEVEL LT 7.0%: CPT | Performed by: UROLOGY

## 2025-02-20 PROCEDURE — 4010F ACE/ARB THERAPY RXD/TAKEN: CPT | Performed by: UROLOGY

## 2025-02-20 RX ORDER — VANCOMYCIN HYDROCHLORIDE 1 G/200ML
1000 INJECTION, SOLUTION INTRAVENOUS ONCE
OUTPATIENT
Start: 2025-02-20 | End: 2025-02-20

## 2025-02-20 NOTE — PROGRESS NOTES
"FUV    Virtual or Telephone Consent    An interactive audio and video telecommunication system which permits real time communications between the patient (at the originating site) and provider (at the distant site) was utilized to provide this telehealth service.   Verbal consent was requested and obtained from Elliot Larson on this date, 02/20/25 for a telehealth visit.       HISTORY OF PRESENT ILLNESS:   Elliot Larson is a 74 y.o. male who is being seen today for fuv    h/o UTIs/prostatitis, BPH s/p Urolift, h/o ED, s/p IPP     Patient underwent IPP placement on 12/20/19. AMS Cx 18+3, MS pump, 100cc conceal reservoir.      Pt with bothersome urge incontinence. Stream ok at times. PVRs have been low. UA's negative except for glucose.      Trial of mybetriq at prior visit, also tried gemteza. Currently on oxybutynin     Cysto (1.14.22) - no strictures, non obstructive prostate, normal bladder    Has fallen and broke his femur since last visit.  Very poor mobility.      Pt had Axonics PNE on 9/6/24.  Inconclusive results    Pt Stage 1 in OR on 2/12/25 - Improvement in symptoms - less urgency and frequency, less leakage. >50% improvement    PAST MEDICAL HISTORY:  Past Medical History:   Diagnosis Date    Adverse effect of anesthesia     After hip surgery \"took 3 days to come off breathing machine\". emergence deliruim- becomes very violent    Anemia     11/21/24 H/h  13.7/39.1 Follows with heme/onc Manny Chandra, APRN-CNP LOV 5/1/24    Asthma     last exacerbation in Dec with URI- alb neb used    Chronic bronchitis (Multi)     Closed fracture dislocation of cervical spine     fusion    Delayed emergence from general anesthesia     Depression     Dyslipidemia     Elevated coronary artery calcium score     CT 1/26/24  272.59    Enlarged prostate with lower urinary tract symptoms (LUTS)     GERD (gastroesophageal reflux disease)     under control    Hepatitis B carrier (Multi)     History of cardiovascular " stress test 05/22/2024    normal    History of central retinal vein occlusion (CMS-[facility]) 2000    right eye    Hypertension     Legally blind     right eye    Lung nodule     CT 4/17/24 No suspicious pulmonary nodules identified.    OAB (overactive bladder)     Osteoarthritis     Postlaminectomy syndrome, lumbar     Thoracic ascending aortic aneurysm (CMS-HCC)     CT 1/26/24 3.9 cm and unchanged from 2018    Type 2 diabetes mellitus     9/19/24 A1c 7.6    Urge incontinence     Urinary tract infection        PAST SURGICAL HISTORY:  Past Surgical History:   Procedure Laterality Date    CATARACT EXTRACTION Bilateral     CERVICAL LAMINECTOMY      COLONOSCOPY      GASTRIC BYPASS      Gastric Surgery For Morbid Obesity Gastric Bypass    HERNIA REPAIR Left     Inguinal Hernia Repair x 2    LUMBAR FUSION      Lumbar Vertebral Fusion    LUMBAR LAMINECTOMY      MR HEAD ANGIO WO IV CONTRAST  04/05/2021    MR HEAD ANGIO WO IV CONTRAST 4/5/2021 [facility]    ORIF HIP FRACTURE Left 06/25/2023    ORIF HIP PROXIMAL END, NECK, SLIDING HIP SCREW    OTHER SURGICAL HISTORY Left 02/01/2022    REVISION JOINT TOTAL KNEE FEMORAL AND ENTIRE TIBIAL COMPONENT    PENILE PROSTHESIS IMPLANT      SEPTOPLASTY      TOTAL KNEE ARTHROPLASTY Bilateral     VITRECTOMY          ALLERGIES:   Allergies   Allergen Reactions    Ciprofloxacin Itching    Ketorolac Rash    Latex Itching    Penicillins Other     Skin sloughing        MEDICATIONS:   Current Outpatient Medications   Medication Instructions    acetaminophen (TYLENOL) 1,000 mg, Every 8 hours PRN    acyclovir (ZOVIRAX) 800 mg, oral, Daily    Blood glucose monitoring meter kit (OneTouch Ultra2 Meter) kit 1 each, miscellaneous, As needed, Check sugar every morning; dispense onetouch ultra meter    cholecalciferol (Vitamin D-3) 50 MCG (2000 UT) tablet 2 tablets, 2 times daily    cloNIDine (CATAPRES) 0.3 mg, oral, 2 times daily    clotrimazole (Lotrimin) 1 % cream Topical, 2 times daily, apply to  affected area    cyanocobalamin (VITAMIN B-12) 500 mcg, Daily RT    dapagliflozin propanediol (FARXIGA) 10 mg, oral, Daily    latanoprost (Xalatan) 0.005 % ophthalmic solution 1 drop, Both Eyes, Nightly    lisinopril 40 mg, oral, Daily    magnesium oxide (MAG-OX) 400 mg, Daily    melatonin 3 mg tablet 1 tablet, Nightly    metformin HCl (METFORMIN ORAL) 4 tablets, Daily    montelukast (SINGULAIR) 10 mg, oral, Daily    oxybutynin XL (Ditropan-XL) 10 mg 24 hr tablet TAKE 1 TABLET EVERY DAY    pantoprazole (PROTONIX) 20 mg, oral, Daily    rosuvastatin (CRESTOR) 10 mg, oral, Daily    tiZANidine (ZANAFLEX) 4 mg, oral, 3 times daily    walker (Ultra-Light Rollator) misc Use for ambulation        PHYSICAL EXAM:  There were no vitals taken for this visit.  Constitutional: Patient appears well-developed and well-nourished. No distress.      Labs  Lab Results   Component Value Date    TESTOSTERONE 399 08/28/2020    TESTOSTERONE 263 08/26/2020     Lab Results   Component Value Date    PSA 1.49 09/19/2024     Lab Results   Component Value Date    GFRMALE 81 09/08/2023     Lab Results   Component Value Date    CREATININE 0.83 02/10/2025     Lab Results   Component Value Date    CHOL 131 09/19/2024     Lab Results   Component Value Date    HDL 57.1 09/19/2024     Lab Results   Component Value Date    CHHDL 2.3 09/19/2024     Lab Results   Component Value Date    LDLF 34 09/08/2023     Lab Results   Component Value Date    VLDL 27 09/19/2024     Lab Results   Component Value Date    TRIG 133 09/19/2024     Lab Results   Component Value Date    HGBA1C 6.2 (H) 02/06/2025     Lab Results   Component Value Date    HCT 38.6 (L) 02/10/2025       Assessment:      1. Urge incontinence            Elliot Larson is a 74 y.o. male here for FUV     Plan:   Successful stage 1  Plan for stage 2 next week

## 2025-02-20 NOTE — PROGRESS NOTES
"FUV    Virtual or Telephone Consent    An interactive audio and video telecommunication system which permits real time communications between the patient (at the originating site) and provider (at the distant site) was utilized to provide this telehealth service.   Verbal consent was requested and obtained from Elliot Larson on this date, 02/20/25 for a telehealth visit.      Last seen - 3/1/23     HISTORY OF PRESENT ILLNESS:   Elliot Larson is a 74 y.o. male who is being seen today for fuv    h/o UTIs/prostatitis, BPH s/p Urolift, h/o ED, s/p IPP     Patient underwent IPP placement on 12/20/19. AMS Cx 18+3, MS pump, 100cc conceal reservoir.      Pt with bothersome urge incontinence. Stream ok at times. PVRs have been low. UA's negative except for glucose.      Trial of mybetriq at prior visit, also tried gemteza. Currently on oxybutynin     Cysto (1.14.22) - no strictures, non obstructive prostate, normal bladder    Has fallen and broke his femur since last visit.  Very poor mobility.      Pt had Axonics PNE on 9/6/24.  Inconclusive results    PAST MEDICAL HISTORY:  Past Medical History:   Diagnosis Date    Adverse effect of anesthesia     After hip surgery \"took 3 days to come off breathing machine\". emergence deliruim- becomes very violent    Anemia     11/21/24 H/h  13.7/39.1 Follows with heme/onc Manny Chandra, APRN-CNP LOV 5/1/24    Asthma     last exacerbation in Dec with URI- alb neb used    Chronic bronchitis (Multi)     Closed fracture dislocation of cervical spine     fusion    Delayed emergence from general anesthesia     Depression     Dyslipidemia     Elevated coronary artery calcium score     CT 1/26/24  272.59    Enlarged prostate with lower urinary tract symptoms (LUTS)     GERD (gastroesophageal reflux disease)     under control    Hepatitis B carrier (Multi)     History of cardiovascular stress test 05/22/2024    normal    History of central retinal vein occlusion (CMS-HCC) 2000    " right eye    Hypertension     Legally blind     right eye    Lung nodule     CT 4/17/24 No suspicious pulmonary nodules identified.    OAB (overactive bladder)     Osteoarthritis     Postlaminectomy syndrome, lumbar     Thoracic ascending aortic aneurysm (CMS-HCC)     CT 1/26/24 3.9 cm and unchanged from 2018    Type 2 diabetes mellitus     9/19/24 A1c 7.6    Urge incontinence     Urinary tract infection        PAST SURGICAL HISTORY:  Past Surgical History:   Procedure Laterality Date    CATARACT EXTRACTION Bilateral     CERVICAL LAMINECTOMY      COLONOSCOPY      GASTRIC BYPASS      Gastric Surgery For Morbid Obesity Gastric Bypass    HERNIA REPAIR Left     Inguinal Hernia Repair x 2    LUMBAR FUSION      Lumbar Vertebral Fusion    LUMBAR LAMINECTOMY      MR HEAD ANGIO WO IV CONTRAST  04/05/2021    MR HEAD ANGIO WO IV CONTRAST 4/5/2021 [facility]    ORIF HIP FRACTURE Left 06/25/2023    ORIF HIP PROXIMAL END, NECK, SLIDING HIP SCREW    OTHER SURGICAL HISTORY Left 02/01/2022    REVISION JOINT TOTAL KNEE FEMORAL AND ENTIRE TIBIAL COMPONENT    PENILE PROSTHESIS IMPLANT      SEPTOPLASTY      TOTAL KNEE ARTHROPLASTY Bilateral     VITRECTOMY          ALLERGIES:   Allergies   Allergen Reactions    Ciprofloxacin Itching    Ketorolac Rash    Latex Itching    Penicillins Other     Skin sloughing        MEDICATIONS:   Current Outpatient Medications   Medication Instructions    acetaminophen (TYLENOL) 1,000 mg, Every 8 hours PRN    acyclovir (ZOVIRAX) 800 mg, oral, Daily    Blood glucose monitoring meter kit (OneTouch Ultra2 Meter) kit 1 each, miscellaneous, As needed, Check sugar every morning; dispense onetouch ultra meter    cholecalciferol (Vitamin D-3) 50 MCG (2000 UT) tablet 2 tablets, 2 times daily    cloNIDine (CATAPRES) 0.3 mg, oral, 2 times daily    clotrimazole (Lotrimin) 1 % cream Topical, 2 times daily, apply to affected area    cyanocobalamin (VITAMIN B-12) 500 mcg, Daily RT    dapagliflozin propanediol  (FARXIGA) 10 mg, oral, Daily    latanoprost (Xalatan) 0.005 % ophthalmic solution 1 drop, Both Eyes, Nightly    lisinopril 40 mg, oral, Daily    magnesium oxide (MAG-OX) 400 mg, Daily    melatonin 3 mg tablet 1 tablet, Nightly    metformin HCl (METFORMIN ORAL) 4 tablets, Daily    montelukast (SINGULAIR) 10 mg, oral, Daily    oxybutynin XL (Ditropan-XL) 10 mg 24 hr tablet TAKE 1 TABLET EVERY DAY    pantoprazole (PROTONIX) 20 mg, oral, Daily    rosuvastatin (CRESTOR) 10 mg, oral, Daily    tiZANidine (ZANAFLEX) 4 mg, oral, 3 times daily    walker (Ultra-Light Rollator) misc Use for ambulation        PHYSICAL EXAM:  There were no vitals taken for this visit.  Constitutional: Patient appears well-developed and well-nourished. No distress.      Labs  Lab Results   Component Value Date    TESTOSTERONE 399 08/28/2020    TESTOSTERONE 263 08/26/2020     Lab Results   Component Value Date    PSA 1.49 09/19/2024     Lab Results   Component Value Date    GFRMALE 81 09/08/2023     Lab Results   Component Value Date    CREATININE 0.83 02/10/2025     Lab Results   Component Value Date    CHOL 131 09/19/2024     Lab Results   Component Value Date    HDL 57.1 09/19/2024     Lab Results   Component Value Date    CHHDL 2.3 09/19/2024     Lab Results   Component Value Date    LDLF 34 09/08/2023     Lab Results   Component Value Date    VLDL 27 09/19/2024     Lab Results   Component Value Date    TRIG 133 09/19/2024     Lab Results   Component Value Date    HGBA1C 6.2 (H) 02/06/2025     Lab Results   Component Value Date    HCT 38.6 (L) 02/10/2025     UA neg  PVR 0      Assessment:      No diagnosis found.    Elliot Larson is a 74 y.o. male here for FUV     Plan:   Inconclusive PNE, considering Stage 1  Do not think AUS would be good option    Patient continues to have bothersome urinary symptoms despite conservative measures and had tried at least 2 medications (anti-cholinergic or beta-agonist).  We discussed OAB pathway as above.   We went into detail about Axonics neuromodulation.  We discussed that this is a long-term treatment that helps to restore communication between the brain, bladder and bowel.    We discussed the peripheral nerve evaluation (PNE).  That it would be done in the office setting with local anesthetic.  Two tiny wires, or leads, would be inserted in the upper buttock.  This would be guided by bony landmarks and patients sensory responses.  Once we confirmed the correct location the leads would be connected to an external device and secured to the patients back.  They would return home with no major restrictions, just no showering or strenuous activity.  They will keep a voiding diary to monitor their responses. They will follow up in office the week after to have the leads removed and review the voiding diary.  A successful trial is deemed if >50% response.    If we decide to move forward with permanent implant that will be done in the operating room with light sedation.  A permanent lead would be placed under fluoroscopic guidance to confirm correct positioning.  This is then connected to an internal pulse generator which can last up to 20 years.    Risks of change in sensation, pain, irritation, bleeding, movement of the lead and implant infection discussed.    Patient in agreement and wants to proceed.

## 2025-02-26 ENCOUNTER — TELEPHONE (OUTPATIENT)
Dept: PREOP | Facility: HOSPITAL | Age: 75
End: 2025-02-26

## 2025-02-26 NOTE — DISCHARGE INSTRUCTIONS
DEPARTMENT OF Urology  DISCHARGE INSTRUCTIONS -- Incontinence Stimulator (Axonics or Interstim)  Outpatient Surgery     C O N F I D E N T I A L   I N F O R M A T I O N     Call Urology Department [phone] during regular daytime business hours (8:00 am - 5:00 pm) and after 5:00 pm ask for the Urology resident with any questions or concerns.       If it is a life-threatening situation, proceed to the nearest emergency department.         Thank you for the opportunity to care for you today.  Your health and healing are very important to us.  We hope we made you feel as comfortable as possible and are committed to your recovery and continued well-being.       The following is a brief overview of your procedure. Some of the information contained on this summary may be confidential.  This information should be kept in your records and should be shared with your regular doctor.     Procedure performed: Incontinence stimulator     What to Expect During your Recovery and Home Care  Anesthesia Side Effects   You received anesthesia today.  You may feel sleepy, tired, or have a sore throat.   You may also feel drowsiness, dizziness, or inability to think clearly.  For your safety, do not drive, drink alcoholic beverages, take any unprescribed medication or make any important decisions for 24 hours.  A responsible adult should be with you for 24 hours.       Activity and Recovery    Go home and rest. No strenuous activity and no heavy lifting over 10 lbs.      Pain Control  Unfortunately, you may experience pain after your procedure.  Adequate management can include alternative measures to help ease your pain and can include ice packs, scrotal support, and over the counter Tylenol. Tramadol may also be prescribe for breakthrough pain. Do not take more than 4,000 mg of Tylenol in a 24-hour period.      Tramadol is a narcotic medication, which can impair judgment, slow reaction times, and cause constipation. Take Miralax,  Colace, or other stool softeners for constipation. Do not drive or operate machinery while taking narcotic medications.     Nausea/Vomiting   Clear liquids are best tolerated at first. Start slow, advance your diet as tolerated to normal foods. Avoid spicy, greasy, heavy foods at first. Also, you may feel nauseous or like you need to vomit if you take any type of medication on an empty stomach.  Call your physician if you are unable to eat or drink and have persistent vomiting.           Signs of Bleeding   Minor bleeding or drainage may occur from the surgical site; however, excessive or consistent bleeding should be reported to your surgeon. Excessive bleeding is defined as blood that is dripping from wound, soaking you bandages, and is ketchup colored, thick with possible blood clots.  Consistent is defined as bleeding that does not stop.     Treatment/wound care:   Keep area(s) clean and dry.   Please visually inspect your wound(s) at least once daily.  If the wound(s) are in a difficult to see location, please use a mirror or have someone else assist with visual inspection.     Signs of Infection  Signs of infection can include fever, excessive swelling, heat, drainage, redness, or severe pain.  If you see any of these occur, please contact [phone]. Any fever higher than 100.4, especially if associated with an ill feeling, abdominal pain, chills, or nausea should be reported to your surgeon.

## 2025-03-04 ENCOUNTER — TELEPHONE (OUTPATIENT)
Dept: PRIMARY CARE | Facility: CLINIC | Age: 75
End: 2025-03-04
Payer: MEDICARE

## 2025-03-04 DIAGNOSIS — Z12.11 COLON CANCER SCREENING: Primary | ICD-10-CM

## 2025-03-04 DIAGNOSIS — J45.909 ASTHMA, UNSPECIFIED ASTHMA SEVERITY, UNSPECIFIED WHETHER COMPLICATED, UNSPECIFIED WHETHER PERSISTENT (HHS-HCC): Primary | ICD-10-CM

## 2025-03-04 RX ORDER — ALBUTEROL SULFATE 0.83 MG/ML
2.5 SOLUTION RESPIRATORY (INHALATION) EVERY 6 HOURS PRN
Qty: 75 ML | Refills: 3 | Status: SHIPPED | OUTPATIENT
Start: 2025-03-04 | End: 2026-03-04

## 2025-03-04 NOTE — TELEPHONE ENCOUNTER
Patient left message stating he would like a call back. Did not disclose further information. The number left is [phone].

## 2025-03-05 ENCOUNTER — ANESTHESIA EVENT (OUTPATIENT)
Dept: OPERATING ROOM | Facility: HOSPITAL | Age: 75
End: 2025-03-05
Payer: MEDICARE

## 2025-03-05 ENCOUNTER — HOSPITAL ENCOUNTER (OUTPATIENT)
Facility: HOSPITAL | Age: 75
Discharge: HOME | End: 2025-03-06
Attending: UROLOGY | Admitting: OTOLARYNGOLOGY
Payer: MEDICARE

## 2025-03-05 ENCOUNTER — ANESTHESIA (OUTPATIENT)
Dept: OPERATING ROOM | Facility: HOSPITAL | Age: 75
End: 2025-03-05
Payer: MEDICARE

## 2025-03-05 DIAGNOSIS — N32.81 OAB (OVERACTIVE BLADDER): Primary | ICD-10-CM

## 2025-03-05 DIAGNOSIS — N39.41 URGE INCONTINENCE: ICD-10-CM

## 2025-03-05 DIAGNOSIS — Z98.890 POST-OPERATIVE STATE: ICD-10-CM

## 2025-03-05 LAB
GLUCOSE BLD MANUAL STRIP-MCNC: 161 MG/DL (ref 74–99)
GLUCOSE BLD MANUAL STRIP-MCNC: 177 MG/DL (ref 74–99)
GLUCOSE BLD MANUAL STRIP-MCNC: 281 MG/DL (ref 74–99)

## 2025-03-05 PROCEDURE — 2500000005 HC RX 250 GENERAL PHARMACY W/O HCPCS: Performed by: ANESTHESIOLOGIST ASSISTANT

## 2025-03-05 PROCEDURE — 96372 THER/PROPH/DIAG INJ SC/IM: CPT | Performed by: STUDENT IN AN ORGANIZED HEALTH CARE EDUCATION/TRAINING PROGRAM

## 2025-03-05 PROCEDURE — 64590 INS/RPL PRPH SAC/GSTR NPG/R: CPT | Performed by: UROLOGY

## 2025-03-05 PROCEDURE — 2500000002 HC RX 250 W HCPCS SELF ADMINISTERED DRUGS (ALT 637 FOR MEDICARE OP, ALT 636 FOR OP/ED): Performed by: STUDENT IN AN ORGANIZED HEALTH CARE EDUCATION/TRAINING PROGRAM

## 2025-03-05 PROCEDURE — 82947 ASSAY GLUCOSE BLOOD QUANT: CPT

## 2025-03-05 PROCEDURE — C1767 GENERATOR, NEURO NON-RECHARG: HCPCS | Performed by: UROLOGY

## 2025-03-05 PROCEDURE — 99231 SBSQ HOSP IP/OBS SF/LOW 25: CPT

## 2025-03-05 PROCEDURE — 99100 ANES PT EXTEME AGE<1 YR&>70: CPT | Performed by: ANESTHESIOLOGY

## 2025-03-05 PROCEDURE — 3700000002 HC GENERAL ANESTHESIA TIME - EACH INCREMENTAL 1 MINUTE: Performed by: UROLOGY

## 2025-03-05 PROCEDURE — 3600000009 HC OR TIME - EACH INCREMENTAL 1 MINUTE - PROCEDURE LEVEL FOUR: Performed by: UROLOGY

## 2025-03-05 PROCEDURE — 2780000003 HC OR 278 NO HCPCS: Performed by: UROLOGY

## 2025-03-05 PROCEDURE — G0378 HOSPITAL OBSERVATION PER HR: HCPCS

## 2025-03-05 PROCEDURE — 2720000007 HC OR 272 NO HCPCS: Performed by: UROLOGY

## 2025-03-05 PROCEDURE — 2500000001 HC RX 250 WO HCPCS SELF ADMINISTERED DRUGS (ALT 637 FOR MEDICARE OP): Performed by: STUDENT IN AN ORGANIZED HEALTH CARE EDUCATION/TRAINING PROGRAM

## 2025-03-05 PROCEDURE — 7100000002 HC RECOVERY ROOM TIME - EACH INCREMENTAL 1 MINUTE: Performed by: UROLOGY

## 2025-03-05 PROCEDURE — 7100000024 HC EXTENDED STAY RECOVERY PER MINUTE- PACU: Performed by: UROLOGY

## 2025-03-05 PROCEDURE — 3700000001 HC GENERAL ANESTHESIA TIME - INITIAL BASE CHARGE: Performed by: UROLOGY

## 2025-03-05 PROCEDURE — RXMED WILLOW AMBULATORY MEDICATION CHARGE

## 2025-03-05 PROCEDURE — A64590 PR IMPLANT PERIPH/GASTRIC NEUROSTIM/RECEIVER: Performed by: ANESTHESIOLOGY

## 2025-03-05 PROCEDURE — 2500000004 HC RX 250 GENERAL PHARMACY W/ HCPCS (ALT 636 FOR OP/ED): Mod: JZ | Performed by: ANESTHESIOLOGY

## 2025-03-05 PROCEDURE — 2500000004 HC RX 250 GENERAL PHARMACY W/ HCPCS (ALT 636 FOR OP/ED): Performed by: STUDENT IN AN ORGANIZED HEALTH CARE EDUCATION/TRAINING PROGRAM

## 2025-03-05 PROCEDURE — 3600000004 HC OR TIME - INITIAL BASE CHARGE - PROCEDURE LEVEL FOUR: Performed by: UROLOGY

## 2025-03-05 PROCEDURE — 2500000004 HC RX 250 GENERAL PHARMACY W/ HCPCS (ALT 636 FOR OP/ED): Performed by: ANESTHESIOLOGY

## 2025-03-05 PROCEDURE — 7100000001 HC RECOVERY ROOM TIME - INITIAL BASE CHARGE: Performed by: UROLOGY

## 2025-03-05 PROCEDURE — A64590 PR IMPLANT PERIPH/GASTRIC NEUROSTIM/RECEIVER: Performed by: ANESTHESIOLOGIST ASSISTANT

## 2025-03-05 PROCEDURE — 2500000004 HC RX 250 GENERAL PHARMACY W/ HCPCS (ALT 636 FOR OP/ED): Performed by: UROLOGY

## 2025-03-05 PROCEDURE — 2500000004 HC RX 250 GENERAL PHARMACY W/ HCPCS (ALT 636 FOR OP/ED): Performed by: ANESTHESIOLOGIST ASSISTANT

## 2025-03-05 DEVICE — NEUROSTIMULATOR
Type: IMPLANTABLE DEVICE | Site: BACK | Status: FUNCTIONAL
Brand: AXONICS

## 2025-03-05 RX ORDER — LATANOPROST 50 UG/ML
1 SOLUTION/ DROPS OPHTHALMIC NIGHTLY
Status: DISCONTINUED | OUTPATIENT
Start: 2025-03-05 | End: 2025-03-06 | Stop reason: HOSPADM

## 2025-03-05 RX ORDER — TRAMADOL HYDROCHLORIDE 50 MG/1
50 TABLET ORAL EVERY 8 HOURS PRN
Qty: 10 TABLET | Refills: 0 | Status: SHIPPED | OUTPATIENT
Start: 2025-03-05 | End: 2025-03-10

## 2025-03-05 RX ORDER — ALBUTEROL SULFATE 0.83 MG/ML
2.5 SOLUTION RESPIRATORY (INHALATION) EVERY 6 HOURS PRN
Status: DISCONTINUED | OUTPATIENT
Start: 2025-03-05 | End: 2025-03-05

## 2025-03-05 RX ORDER — LIDOCAINE HYDROCHLORIDE 10 MG/ML
INJECTION, SOLUTION EPIDURAL; INFILTRATION; INTRACAUDAL; PERINEURAL AS NEEDED
Status: DISCONTINUED | OUTPATIENT
Start: 2025-03-05 | End: 2025-03-05 | Stop reason: HOSPADM

## 2025-03-05 RX ORDER — ENOXAPARIN SODIUM 100 MG/ML
40 INJECTION SUBCUTANEOUS EVERY 24 HOURS
Status: DISCONTINUED | OUTPATIENT
Start: 2025-03-05 | End: 2025-03-06 | Stop reason: HOSPADM

## 2025-03-05 RX ORDER — VIT C/E/ZN/COPPR/LUTEIN/ZEAXAN 250MG-90MG
500 CAPSULE ORAL DAILY
Status: DISCONTINUED | OUTPATIENT
Start: 2025-03-05 | End: 2025-03-06 | Stop reason: HOSPADM

## 2025-03-05 RX ORDER — VANCOMYCIN HYDROCHLORIDE 1 G/200ML
1000 INJECTION, SOLUTION INTRAVENOUS ONCE
Status: COMPLETED | OUTPATIENT
Start: 2025-03-05 | End: 2025-03-05

## 2025-03-05 RX ORDER — ONDANSETRON HYDROCHLORIDE 2 MG/ML
4 INJECTION, SOLUTION INTRAVENOUS ONCE AS NEEDED
Status: DISCONTINUED | OUTPATIENT
Start: 2025-03-05 | End: 2025-03-05 | Stop reason: HOSPADM

## 2025-03-05 RX ORDER — ACETAMINOPHEN 325 MG/1
975 TABLET ORAL EVERY 6 HOURS
Status: DISCONTINUED | OUTPATIENT
Start: 2025-03-05 | End: 2025-03-06 | Stop reason: HOSPADM

## 2025-03-05 RX ORDER — MONTELUKAST SODIUM 10 MG/1
10 TABLET ORAL DAILY
Status: DISCONTINUED | OUTPATIENT
Start: 2025-03-06 | End: 2025-03-06 | Stop reason: HOSPADM

## 2025-03-05 RX ORDER — ONDANSETRON HYDROCHLORIDE 2 MG/ML
4 INJECTION, SOLUTION INTRAVENOUS EVERY 8 HOURS PRN
Status: DISCONTINUED | OUTPATIENT
Start: 2025-03-05 | End: 2025-03-06 | Stop reason: HOSPADM

## 2025-03-05 RX ORDER — POLYETHYLENE GLYCOL 3350 17 G/17G
17 POWDER, FOR SOLUTION ORAL DAILY
Status: DISCONTINUED | OUTPATIENT
Start: 2025-03-05 | End: 2025-03-06 | Stop reason: HOSPADM

## 2025-03-05 RX ORDER — TALC
5 POWDER (GRAM) TOPICAL NIGHTLY
Status: DISCONTINUED | OUTPATIENT
Start: 2025-03-05 | End: 2025-03-06 | Stop reason: HOSPADM

## 2025-03-05 RX ORDER — ALBUTEROL SULFATE 0.83 MG/ML
2.5 SOLUTION RESPIRATORY (INHALATION) ONCE AS NEEDED
Status: DISCONTINUED | OUTPATIENT
Start: 2025-03-05 | End: 2025-03-05 | Stop reason: HOSPADM

## 2025-03-05 RX ORDER — INSULIN LISPRO 100 [IU]/ML
0-5 INJECTION, SOLUTION INTRAVENOUS; SUBCUTANEOUS
Status: DISCONTINUED | OUTPATIENT
Start: 2025-03-06 | End: 2025-03-06 | Stop reason: HOSPADM

## 2025-03-05 RX ORDER — ONDANSETRON 4 MG/1
4 TABLET, ORALLY DISINTEGRATING ORAL EVERY 8 HOURS PRN
Status: DISCONTINUED | OUTPATIENT
Start: 2025-03-05 | End: 2025-03-06 | Stop reason: HOSPADM

## 2025-03-05 RX ORDER — LABETALOL HYDROCHLORIDE 5 MG/ML
5 INJECTION, SOLUTION INTRAVENOUS ONCE AS NEEDED
Status: DISCONTINUED | OUTPATIENT
Start: 2025-03-05 | End: 2025-03-05 | Stop reason: HOSPADM

## 2025-03-05 RX ORDER — PANTOPRAZOLE SODIUM 20 MG/1
20 TABLET, DELAYED RELEASE ORAL DAILY
Status: DISCONTINUED | OUTPATIENT
Start: 2025-03-06 | End: 2025-03-06 | Stop reason: HOSPADM

## 2025-03-05 RX ORDER — TRAMADOL HYDROCHLORIDE 50 MG/1
50 TABLET ORAL EVERY 8 HOURS PRN
Status: DISCONTINUED | OUTPATIENT
Start: 2025-03-05 | End: 2025-03-05

## 2025-03-05 RX ORDER — LISINOPRIL 20 MG/1
40 TABLET ORAL DAILY
Status: DISCONTINUED | OUTPATIENT
Start: 2025-03-05 | End: 2025-03-06 | Stop reason: HOSPADM

## 2025-03-05 RX ORDER — HYDRALAZINE HYDROCHLORIDE 20 MG/ML
5 INJECTION INTRAMUSCULAR; INTRAVENOUS EVERY 30 MIN PRN
Status: DISCONTINUED | OUTPATIENT
Start: 2025-03-05 | End: 2025-03-05 | Stop reason: HOSPADM

## 2025-03-05 RX ORDER — FENTANYL CITRATE 50 UG/ML
INJECTION, SOLUTION INTRAMUSCULAR; INTRAVENOUS AS NEEDED
Status: DISCONTINUED | OUTPATIENT
Start: 2025-03-05 | End: 2025-03-05

## 2025-03-05 RX ORDER — ROSUVASTATIN CALCIUM 10 MG/1
10 TABLET, COATED ORAL DAILY
Status: DISCONTINUED | OUTPATIENT
Start: 2025-03-06 | End: 2025-03-06 | Stop reason: HOSPADM

## 2025-03-05 RX ORDER — PROPOFOL 10 MG/ML
INJECTION, EMULSION INTRAVENOUS AS NEEDED
Status: DISCONTINUED | OUTPATIENT
Start: 2025-03-05 | End: 2025-03-05

## 2025-03-05 RX ORDER — OXYCODONE HYDROCHLORIDE 5 MG/1
5 TABLET ORAL EVERY 4 HOURS PRN
Status: DISCONTINUED | OUTPATIENT
Start: 2025-03-05 | End: 2025-03-05 | Stop reason: HOSPADM

## 2025-03-05 RX ORDER — SODIUM CHLORIDE, SODIUM LACTATE, POTASSIUM CHLORIDE, CALCIUM CHLORIDE 600; 310; 30; 20 MG/100ML; MG/100ML; MG/100ML; MG/100ML
100 INJECTION, SOLUTION INTRAVENOUS CONTINUOUS
Status: DISCONTINUED | OUTPATIENT
Start: 2025-03-05 | End: 2025-03-05 | Stop reason: HOSPADM

## 2025-03-05 RX ORDER — ALBUTEROL SULFATE 0.83 MG/ML
2.5 SOLUTION RESPIRATORY (INHALATION) EVERY 2 HOUR PRN
Status: DISCONTINUED | OUTPATIENT
Start: 2025-03-05 | End: 2025-03-06 | Stop reason: HOSPADM

## 2025-03-05 RX ORDER — TIZANIDINE 4 MG/1
4 TABLET ORAL 3 TIMES DAILY
Status: DISCONTINUED | OUTPATIENT
Start: 2025-03-05 | End: 2025-03-06 | Stop reason: HOSPADM

## 2025-03-05 RX ORDER — TALC
3 POWDER (GRAM) TOPICAL NIGHTLY PRN
Status: DISCONTINUED | OUTPATIENT
Start: 2025-03-05 | End: 2025-03-05 | Stop reason: SDUPTHER

## 2025-03-05 RX ORDER — TALC
3 POWDER (GRAM) TOPICAL NIGHTLY
Status: DISCONTINUED | OUTPATIENT
Start: 2025-03-05 | End: 2025-03-05 | Stop reason: SDUPTHER

## 2025-03-05 RX ORDER — SODIUM CHLORIDE, SODIUM LACTATE, POTASSIUM CHLORIDE, CALCIUM CHLORIDE 600; 310; 30; 20 MG/100ML; MG/100ML; MG/100ML; MG/100ML
INJECTION, SOLUTION INTRAVENOUS CONTINUOUS PRN
Status: DISCONTINUED | OUTPATIENT
Start: 2025-03-05 | End: 2025-03-05

## 2025-03-05 RX ORDER — OXYCODONE HYDROCHLORIDE 5 MG/1
10 TABLET ORAL EVERY 6 HOURS PRN
Status: DISCONTINUED | OUTPATIENT
Start: 2025-03-05 | End: 2025-03-06 | Stop reason: HOSPADM

## 2025-03-05 RX ORDER — LANOLIN ALCOHOL/MO/W.PET/CERES
400 CREAM (GRAM) TOPICAL DAILY
Status: DISCONTINUED | OUTPATIENT
Start: 2025-03-05 | End: 2025-03-06 | Stop reason: HOSPADM

## 2025-03-05 RX ORDER — GLYCOPYRROLATE 0.2 MG/ML
INJECTION INTRAMUSCULAR; INTRAVENOUS AS NEEDED
Status: DISCONTINUED | OUTPATIENT
Start: 2025-03-05 | End: 2025-03-05

## 2025-03-05 RX ORDER — MIDAZOLAM HYDROCHLORIDE 1 MG/ML
INJECTION, SOLUTION INTRAMUSCULAR; INTRAVENOUS AS NEEDED
Status: DISCONTINUED | OUTPATIENT
Start: 2025-03-05 | End: 2025-03-05

## 2025-03-05 RX ORDER — LIDOCAINE HYDROCHLORIDE 10 MG/ML
0.1 INJECTION, SOLUTION EPIDURAL; INFILTRATION; INTRACAUDAL; PERINEURAL ONCE
Status: DISCONTINUED | OUTPATIENT
Start: 2025-03-05 | End: 2025-03-05 | Stop reason: HOSPADM

## 2025-03-05 RX ORDER — CHOLECALCIFEROL (VITAMIN D3) 25 MCG
4000 TABLET ORAL 2 TIMES DAILY
Status: DISCONTINUED | OUTPATIENT
Start: 2025-03-05 | End: 2025-03-06 | Stop reason: HOSPADM

## 2025-03-05 RX ORDER — ACYCLOVIR 400 MG/1
800 TABLET ORAL DAILY
Status: DISCONTINUED | OUTPATIENT
Start: 2025-03-06 | End: 2025-03-06 | Stop reason: HOSPADM

## 2025-03-05 RX ADMIN — ENOXAPARIN SODIUM 40 MG: 40 INJECTION SUBCUTANEOUS at 21:43

## 2025-03-05 RX ADMIN — PROPOFOL 10 MG: 10 INJECTION, EMULSION INTRAVENOUS at 13:05

## 2025-03-05 RX ADMIN — Medication 4000 UNITS: at 20:47

## 2025-03-05 RX ADMIN — GLYCOPYRROLATE 0.1 MG: 0.2 INJECTION INTRAMUSCULAR; INTRAVENOUS at 13:07

## 2025-03-05 RX ADMIN — LISINOPRIL 40 MG: 20 TABLET ORAL at 18:29

## 2025-03-05 RX ADMIN — LATANOPROST 1 DROP: 50 SOLUTION/ DROPS OPHTHALMIC at 21:43

## 2025-03-05 RX ADMIN — PROPOFOL 100 MCG/KG/MIN: 10 INJECTION, EMULSION INTRAVENOUS at 13:06

## 2025-03-05 RX ADMIN — Medication 10 MG: at 13:05

## 2025-03-05 RX ADMIN — SODIUM CHLORIDE, POTASSIUM CHLORIDE, SODIUM LACTATE AND CALCIUM CHLORIDE: 600; 310; 30; 20 INJECTION, SOLUTION INTRAVENOUS at 12:58

## 2025-03-05 RX ADMIN — MIDAZOLAM HYDROCHLORIDE 1 MG: 1 INJECTION, SOLUTION INTRAMUSCULAR; INTRAVENOUS at 13:27

## 2025-03-05 RX ADMIN — FENTANYL CITRATE 50 MCG: 50 INJECTION, SOLUTION INTRAMUSCULAR; INTRAVENOUS at 13:05

## 2025-03-05 RX ADMIN — OXYCODONE HYDROCHLORIDE 10 MG: 5 TABLET ORAL at 18:29

## 2025-03-05 RX ADMIN — VANCOMYCIN HYDROCHLORIDE 1000 MG: 1 INJECTION, SOLUTION INTRAVENOUS at 10:26

## 2025-03-05 RX ADMIN — CYANOCOBALAMIN TAB 500 MCG 500 MCG: 500 TAB at 20:47

## 2025-03-05 RX ADMIN — HYDROMORPHONE HYDROCHLORIDE 0.5 MG: 1 INJECTION, SOLUTION INTRAMUSCULAR; INTRAVENOUS; SUBCUTANEOUS at 14:36

## 2025-03-05 RX ADMIN — Medication 400 MG: at 20:46

## 2025-03-05 RX ADMIN — HYDROMORPHONE HYDROCHLORIDE 0.5 MG: 1 INJECTION, SOLUTION INTRAMUSCULAR; INTRAVENOUS; SUBCUTANEOUS at 16:04

## 2025-03-05 RX ADMIN — MIDAZOLAM HYDROCHLORIDE 2 MG: 1 INJECTION, SOLUTION INTRAMUSCULAR; INTRAVENOUS at 12:15

## 2025-03-05 RX ADMIN — MIDAZOLAM HYDROCHLORIDE 2 MG: 1 INJECTION, SOLUTION INTRAMUSCULAR; INTRAVENOUS at 13:04

## 2025-03-05 RX ADMIN — ACETAMINOPHEN 975 MG: 325 TABLET, FILM COATED ORAL at 18:29

## 2025-03-05 RX ADMIN — TIZANIDINE 4 MG: 4 TABLET ORAL at 20:46

## 2025-03-05 RX ADMIN — Medication 10 MG: at 13:30

## 2025-03-05 ASSESSMENT — PAIN - FUNCTIONAL ASSESSMENT
PAIN_FUNCTIONAL_ASSESSMENT: 0-10
PAIN_FUNCTIONAL_ASSESSMENT: UNABLE TO SELF-REPORT
PAIN_FUNCTIONAL_ASSESSMENT: 0-10

## 2025-03-05 ASSESSMENT — COGNITIVE AND FUNCTIONAL STATUS - GENERAL
CLIMB 3 TO 5 STEPS WITH RAILING: A LITTLE
MOBILITY SCORE: 22
WALKING IN HOSPITAL ROOM: A LITTLE
DAILY ACTIVITIY SCORE: 24

## 2025-03-05 ASSESSMENT — PAIN SCALES - GENERAL
PAINLEVEL_OUTOF10: 7
PAINLEVEL_OUTOF10: 1
PAINLEVEL_OUTOF10: 2
PAINLEVEL_OUTOF10: 6
PAINLEVEL_OUTOF10: 0 - NO PAIN
PAINLEVEL_OUTOF10: 7
PAINLEVEL_OUTOF10: 6
PAINLEVEL_OUTOF10: 0 - NO PAIN
PAINLEVEL_OUTOF10: 7
PAINLEVEL_OUTOF10: 7
PAINLEVEL_OUTOF10: 6

## 2025-03-05 ASSESSMENT — PAIN DESCRIPTION - DESCRIPTORS
DESCRIPTORS: ACHING;SHARP
DESCRIPTORS: SORE

## 2025-03-05 ASSESSMENT — PAIN DESCRIPTION - LOCATION: LOCATION: INCISION

## 2025-03-05 NOTE — ANESTHESIA PREPROCEDURE EVALUATION
Patient: Elliot Larson    Procedure Information       Date/Time: 03/05/25 1145    Procedure: Insertion Neurostimulator (Back)    Location: [facility] A OR 01 / Virtual [facility] A OR    Surgeons: Dick Gant MD          75 yo M hx emergence delirium (gets violent and aggressive but did well with 2/12 with MAC for stage 1). Hx asthma, GERD, HTN, blindness R eye, thoracic aneursym, DM2 (A1c 7.6), s/p gastric bypass, lumbar fusion.  Hx stress test 2/2024 was normal    Relevant Problems   Cardiac   (+) Abnormal EKG   (+) Hypertension   (+) PAD (peripheral artery disease) (CMS-HCC)      Pulmonary   (+) Asthma      Neuro   (+) Depression      GI   (+) GERD (gastroesophageal reflux disease)      /Renal   (+) Enlarged prostate with lower urinary tract symptoms (LUTS)      Endocrine   (+) Diabetic nephropathy (Multi)      Musculoskeletal   (+) Disc degeneration, lumbar   (+) Osteoarthritis   (+) Primary osteoarthritis of first carpometacarpal joint of right hand      ID   (+) Onychomycosis   (+) Tinea pedis of both feet       Clinical information reviewed:   Tobacco  Allergies  Meds   Med Hx  Surg Hx   Fam Hx  Soc Hx        NPO Detail:  NPO/Void Status  Carbohydrate Drink Given Prior to Surgery? : N  Date of Last Liquid: 03/05/25  Time of Last Liquid: 0500  Date of Last Solid: 03/04/25  Time of Last Solid: 2100  Last Intake Type: Clear fluids  Time of Last Void: 0950         Physical Exam    Airway  Mallampati: III  TM distance: >3 FB  Neck ROM: full  Comments: beard   Cardiovascular   Rate: normal     Dental - normal exam     Pulmonary - normal exam     Abdominal            Anesthesia Plan    History of general anesthesia?: yes  History of complications of general anesthesia?: no    ASA 3     general     intravenous induction   Postoperative administration of opioids is intended.  Anesthetic plan and risks discussed with patient.

## 2025-03-05 NOTE — NURSING NOTE
Patient arrived from pacu, oriented to room and call light, educated important to ambulate, take deeps breaths post op, use SCDs to prevent blood clots, keep moving to prevent pneumonia, prevent post op constipation, help him to pass flatus after surgery, patient is ordering dinner.

## 2025-03-05 NOTE — PERIOPERATIVE NURSING NOTE
Patient name & assigned room #602 Elliot Larson   Procedure: Insertion of Neurostimulator  Anesthesia type (i.e. general, regional, MAC): general   Nerve block (if applicable): no  Estimated blood loss (EBL) in OR:  Relevant PMH: urge incontinence, DMII, anaemia,asthma, legally blind, depression, dyslipidemia, enlarged prostate, GERD, HTN, OA, lung nodule, thoracic ascending aortic aneurysm   Orientation/mental status: x4  Incision site(s)/location, surgical dressing(s), and drain type/location:  1 incision buttocks w/ dermabond  Fluids given in OR/PACU, IV site(s), and drips/fluids currently infusin R wrist   PO status (last oral intake): patient tolerated dinner meal   Last set of vital signs & O2 requirements: 169/80, HR 90, 99% RA  Current pain level & last pain/nausea medication dose/time given: 1 mg of dilaudid given last dose @1604  Last void/[place] in place: 1600  Equipment sent with patient (i.e. Polar Cube, TENs unit, walker, crutches): walker  SCDs on (yes/no): yes   Mode of transport (cart or bed): cart  Belongings sent with patient: glasses, cell phone, clothing   Emergency contact (name, relationship, phone number): yolanda (friend) [phone]

## 2025-03-05 NOTE — ANESTHESIA POSTPROCEDURE EVALUATION
Patient: Elliot Larson    Procedure Summary       Date: 03/05/25 Room / Location: [facility] A OR 01 / Virtual [facility] A OR    Anesthesia Start: 1255 Anesthesia Stop: 1333    Procedure: Insertion Neurostimulator (Back) Diagnosis:       Urge incontinence      (Urge incontinence [N39.41])    Surgeons: Dick Gant MD Responsible Provider: Eduardo Trinh MD    Anesthesia Type: general ASA Status: 3            Anesthesia Type: general    Vitals Value Taken Time   BP  03/05/25 1356   Temp  03/05/25 1356   Pulse  03/05/25 1356   Resp  03/05/25 1356   SpO2  03/05/25 1356       Anesthesia Post Evaluation    Patient location during evaluation: bedside  Patient participation: complete - patient participated  Level of consciousness: awake  Pain management: adequate  Airway patency: patent  Cardiovascular status: acceptable  Respiratory status: acceptable  Hydration status: acceptable  Postoperative Nausea and Vomiting: none        No notable events documented.

## 2025-03-05 NOTE — OP NOTE
Insertion Neurostimulator Operative Note     Date: 3/5/2025  OR Location: [facility] A OR    Name: Elliot Larson, : 1950, Age: 74 y.o., MRN: [id number], Sex: male    Diagnosis  Pre-op Diagnosis      * Urge incontinence [N39.41] Post-op Diagnosis     * Urge incontinence [N39.41]     Procedures  Insertion Neurostimulator  69267 - ND INS/RPLC PERPH SAC/GSTRC NPG/RCVR PCKT CRTJ&CONN      Surgeons      * Dick Gant - Primary    Resident/Fellow/Other Assistant:  Surgeons and Role:     * Maximiliano Armijo MD - Resident - Assisting    Staff:   Circulator: Linda Nieto Person: Stacie    Anesthesia Staff: Anesthesiologist: Eduardo Trinh MD  C-AA: ISAIAS Sharif  ALIZE: Patrick Gallagher    Procedure Summary  Anesthesia: General  ASA: III  Estimated Blood Loss: 5mL  Intra-op Medications:   Administrations occurring from 1145 to 1300 on 25:   Medication Name Total Dose   lactated Ringer's infusion Cannot be calculated   midazolam (Versed) injection 1 mg/mL 2 mg              Anesthesia Record               Intraprocedure I/O Totals          Intake    lactated Ringer's 300.00 mL    Total Intake 300 mL       Output    Est. Blood Loss 2 mL    Total Output 2 mL       Net    Net Volume 298 mL          Specimen: No specimens collected              Drains and/or Catheters: * None in log *    Tourniquet Times:         Implants:  Implants       Type Name Action Serial No.      Neuro Interventional Implant NEUROSTIMULATOR, F15 - [id number] - [id number] Implanted [id number]              Findings: routine 2nd stage axonic implantation in left buttock     Indications: Elliot Larson is an 74 y.o. male who is having surgery for Urge incontinence [N39.41].     The patient was seen in the preoperative area. The risks, benefits, complications, treatment options, non-operative alternatives, expected recovery and outcomes were discussed with the patient. The possibilities of reaction to medication, pulmonary aspiration, injury  to surrounding structures, bleeding, recurrent infection, the need for additional procedures, failure to diagnose a condition, and creating a complication requiring transfusion or operation were discussed with the patient. The patient concurred with the proposed plan, giving informed consent.  The site of surgery was properly noted/marked if necessary per policy. The patient has been actively warmed in preoperative area. Preoperative antibiotics have been ordered and given within 1 hours of incision. Venous thrombosis prophylaxis have been ordered including bilateral sequential compression devices    Procedure Details:   After surgical consent was reviewed with the patient, including risks, benefits and alternatives, the patient expressed desire to proceed with surgery.  The patient was then taken to the operating room and placed in the prone position. Pillows were placed in the lower abdomen and hips to level and flatten the sacrum and allow the toes to dangle freely.  Intravenous Ancef was infused 30 minutes prior to incision for infection prophylaxis. A ground pad was placed on the bottom of the patient's foot and was connected to the Clinician  along with the test stimulation cable (J-hook). Tape was used on the buttocks to expose the anus and allow for observation of the ashlie response. The patient was then prepped and draped in the usual fashion.   MAC anesthesia was induced.      After administration of local anesthetic, a 4cm incision was made over prior site of the patients leads on the left buttock.  The incision was opened and the leads were brought out of the incision. While protecting the leads we created a pocket at a depth of 2 cm large enough to accommodate the battery. The pocket was irrigate and hemostasis was adequate. The wrench was used to disconnect the lead connector from the temporary wire which was then removed through the contralateral side of the patient where the wires were  externalized. The neuro stimulator was connected to the lead using the wrench and placed easily into the pocket.  Any excessive lead was coiled and placed behind the neurostimulator. Impedances were confirmed to be within normal limits.     The incisions were irrigated with antibiotic solution in sterile water and the INS incision was closed with 2-0 Vicryl for the subcutaneous layer, skin was closed with running 4-0 stratafix skin sutures. Skin glue was applied.    Instrument, sponge, and needle counts were correct times two.    Complications:  None; patient tolerated the procedure well.    Disposition: PACU - hemodynamically stable.  Condition: stable             Attending Attestation:     Dick Gant  Phone Number: [phone]

## 2025-03-06 ENCOUNTER — PHARMACY VISIT (OUTPATIENT)
Dept: PHARMACY | Facility: CLINIC | Age: 75
End: 2025-03-06
Payer: COMMERCIAL

## 2025-03-06 ENCOUNTER — APPOINTMENT (OUTPATIENT)
Dept: ENDOCRINOLOGY | Facility: CLINIC | Age: 75
End: 2025-03-06
Payer: MEDICARE

## 2025-03-06 VITALS
TEMPERATURE: 98.4 F | SYSTOLIC BLOOD PRESSURE: 119 MMHG | OXYGEN SATURATION: 98 % | DIASTOLIC BLOOD PRESSURE: 71 MMHG | HEART RATE: 73 BPM | HEIGHT: 68 IN | RESPIRATION RATE: 17 BRPM | BODY MASS INDEX: 33.41 KG/M2 | WEIGHT: 220.46 LBS

## 2025-03-06 LAB
ANION GAP SERPL CALC-SCNC: 13 MMOL/L (ref 10–20)
BUN SERPL-MCNC: 22 MG/DL (ref 6–23)
CALCIUM SERPL-MCNC: 8.5 MG/DL (ref 8.6–10.3)
CHLORIDE SERPL-SCNC: 105 MMOL/L (ref 98–107)
CO2 SERPL-SCNC: 27 MMOL/L (ref 21–32)
CREAT SERPL-MCNC: 0.96 MG/DL (ref 0.5–1.3)
EGFRCR SERPLBLD CKD-EPI 2021: 83 ML/MIN/1.73M*2
ERYTHROCYTE [DISTWIDTH] IN BLOOD BY AUTOMATED COUNT: 12.3 % (ref 11.5–14.5)
GLUCOSE BLD MANUAL STRIP-MCNC: 206 MG/DL (ref 74–99)
GLUCOSE SERPL-MCNC: 149 MG/DL (ref 74–99)
HCT VFR BLD AUTO: 36.6 % (ref 41–52)
HGB BLD-MCNC: 12.7 G/DL (ref 13.5–17.5)
MCH RBC QN AUTO: 33.6 PG (ref 26–34)
MCHC RBC AUTO-ENTMCNC: 34.7 G/DL (ref 32–36)
MCV RBC AUTO: 97 FL (ref 80–100)
NRBC BLD-RTO: 0 /100 WBCS (ref 0–0)
PLATELET # BLD AUTO: 150 X10*3/UL (ref 150–450)
POTASSIUM SERPL-SCNC: 4 MMOL/L (ref 3.5–5.3)
RBC # BLD AUTO: 3.78 X10*6/UL (ref 4.5–5.9)
SODIUM SERPL-SCNC: 141 MMOL/L (ref 136–145)
WBC # BLD AUTO: 4.1 X10*3/UL (ref 4.4–11.3)

## 2025-03-06 PROCEDURE — 80048 BASIC METABOLIC PNL TOTAL CA: CPT

## 2025-03-06 PROCEDURE — 2500000001 HC RX 250 WO HCPCS SELF ADMINISTERED DRUGS (ALT 637 FOR MEDICARE OP): Performed by: STUDENT IN AN ORGANIZED HEALTH CARE EDUCATION/TRAINING PROGRAM

## 2025-03-06 PROCEDURE — 99239 HOSP IP/OBS DSCHRG MGMT >30: CPT | Performed by: NURSE PRACTITIONER

## 2025-03-06 PROCEDURE — 2500000001 HC RX 250 WO HCPCS SELF ADMINISTERED DRUGS (ALT 637 FOR MEDICARE OP): Performed by: NURSE PRACTITIONER

## 2025-03-06 PROCEDURE — 2500000004 HC RX 250 GENERAL PHARMACY W/ HCPCS (ALT 636 FOR OP/ED)

## 2025-03-06 PROCEDURE — 85027 COMPLETE CBC AUTOMATED: CPT

## 2025-03-06 PROCEDURE — G0378 HOSPITAL OBSERVATION PER HR: HCPCS

## 2025-03-06 PROCEDURE — 82947 ASSAY GLUCOSE BLOOD QUANT: CPT

## 2025-03-06 PROCEDURE — 82374 ASSAY BLOOD CARBON DIOXIDE: CPT

## 2025-03-06 PROCEDURE — 2500000004 HC RX 250 GENERAL PHARMACY W/ HCPCS (ALT 636 FOR OP/ED): Performed by: STUDENT IN AN ORGANIZED HEALTH CARE EDUCATION/TRAINING PROGRAM

## 2025-03-06 PROCEDURE — 7100000011 HC EXTENDED STAY RECOVERY HOURLY - NURSING UNIT

## 2025-03-06 PROCEDURE — 36415 COLL VENOUS BLD VENIPUNCTURE: CPT

## 2025-03-06 PROCEDURE — 2500000002 HC RX 250 W HCPCS SELF ADMINISTERED DRUGS (ALT 637 FOR MEDICARE OP, ALT 636 FOR OP/ED): Performed by: STUDENT IN AN ORGANIZED HEALTH CARE EDUCATION/TRAINING PROGRAM

## 2025-03-06 RX ORDER — DIPHENHYDRAMINE HYDROCHLORIDE 50 MG/ML
12.5 INJECTION INTRAMUSCULAR; INTRAVENOUS ONCE
Status: COMPLETED | OUTPATIENT
Start: 2025-03-06 | End: 2025-03-06

## 2025-03-06 RX ORDER — DIPHENHYDRAMINE HCL 25 MG
25 CAPSULE ORAL EVERY 6 HOURS PRN
Status: DISCONTINUED | OUTPATIENT
Start: 2025-03-06 | End: 2025-03-06 | Stop reason: HOSPADM

## 2025-03-06 RX ORDER — POLYETHYLENE GLYCOL 3350 17 G/17G
17 POWDER, FOR SOLUTION ORAL DAILY PRN
Start: 2025-03-06

## 2025-03-06 RX ORDER — DIPHENHYDRAMINE HCL 25 MG
25 CAPSULE ORAL EVERY 8 HOURS PRN
Status: DISCONTINUED | OUTPATIENT
Start: 2025-03-06 | End: 2025-03-06

## 2025-03-06 RX ADMIN — ACETAMINOPHEN 975 MG: 325 TABLET, FILM COATED ORAL at 12:05

## 2025-03-06 RX ADMIN — DIPHENHYDRAMINE HYDROCHLORIDE 12.5 MG: 50 INJECTION, SOLUTION INTRAMUSCULAR; INTRAVENOUS at 02:04

## 2025-03-06 RX ADMIN — DIPHENHYDRAMINE HYDROCHLORIDE 25 MG: 25 CAPSULE ORAL at 12:05

## 2025-03-06 RX ADMIN — MONTELUKAST 10 MG: 10 TABLET, FILM COATED ORAL at 09:27

## 2025-03-06 RX ADMIN — OXYCODONE HYDROCHLORIDE 10 MG: 5 TABLET ORAL at 12:50

## 2025-03-06 RX ADMIN — DIPHENHYDRAMINE HYDROCHLORIDE 12.5 MG: 50 INJECTION, SOLUTION INTRAMUSCULAR; INTRAVENOUS at 03:03

## 2025-03-06 RX ADMIN — TIZANIDINE 4 MG: 4 TABLET ORAL at 09:27

## 2025-03-06 RX ADMIN — CYANOCOBALAMIN TAB 500 MCG 500 MCG: 500 TAB at 09:26

## 2025-03-06 RX ADMIN — OXYCODONE HYDROCHLORIDE 10 MG: 5 TABLET ORAL at 06:32

## 2025-03-06 RX ADMIN — ACETAMINOPHEN 975 MG: 325 TABLET, FILM COATED ORAL at 06:32

## 2025-03-06 RX ADMIN — ROSUVASTATIN 10 MG: 10 TABLET, FILM COATED ORAL at 09:27

## 2025-03-06 RX ADMIN — ACETAMINOPHEN 975 MG: 325 TABLET, FILM COATED ORAL at 00:33

## 2025-03-06 RX ADMIN — LISINOPRIL 40 MG: 20 TABLET ORAL at 09:27

## 2025-03-06 RX ADMIN — ACYCLOVIR 800 MG: 400 TABLET ORAL at 09:27

## 2025-03-06 RX ADMIN — OXYCODONE HYDROCHLORIDE 10 MG: 5 TABLET ORAL at 00:33

## 2025-03-06 RX ADMIN — Medication 400 MG: at 09:27

## 2025-03-06 RX ADMIN — Medication 4000 UNITS: at 09:27

## 2025-03-06 RX ADMIN — INSULIN LISPRO 2 UNITS: 100 INJECTION, SOLUTION INTRAVENOUS; SUBCUTANEOUS at 12:05

## 2025-03-06 RX ADMIN — PANTOPRAZOLE SODIUM 20 MG: 20 TABLET, DELAYED RELEASE ORAL at 09:27

## 2025-03-06 SDOH — SOCIAL STABILITY: SOCIAL INSECURITY: HAVE YOU HAD ANY THOUGHTS OF HARMING ANYONE ELSE?: NO

## 2025-03-06 SDOH — SOCIAL STABILITY: SOCIAL INSECURITY: WITHIN THE LAST YEAR, HAVE YOU BEEN HUMILIATED OR EMOTIONALLY ABUSED IN OTHER WAYS BY YOUR PARTNER OR EX-PARTNER?: NO

## 2025-03-06 SDOH — ECONOMIC STABILITY: HOUSING INSECURITY: AT ANY TIME IN THE PAST 12 MONTHS, WERE YOU HOMELESS OR LIVING IN A SHELTER (INCLUDING NOW)?: NO

## 2025-03-06 SDOH — SOCIAL STABILITY: SOCIAL INSECURITY: DO YOU FEEL ANYONE HAS EXPLOITED OR TAKEN ADVANTAGE OF YOU FINANCIALLY OR OF YOUR PERSONAL PROPERTY?: NO

## 2025-03-06 SDOH — SOCIAL STABILITY: SOCIAL INSECURITY: DO YOU FEEL UNSAFE GOING BACK TO THE PLACE WHERE YOU ARE LIVING?: NO

## 2025-03-06 SDOH — ECONOMIC STABILITY: HOUSING INSECURITY: IN THE LAST 12 MONTHS, WAS THERE A TIME WHEN YOU WERE NOT ABLE TO PAY THE MORTGAGE OR RENT ON TIME?: NO

## 2025-03-06 SDOH — ECONOMIC STABILITY: FOOD INSECURITY: WITHIN THE PAST 12 MONTHS, THE FOOD YOU BOUGHT JUST DIDN'T LAST AND YOU DIDN'T HAVE MONEY TO GET MORE.: NEVER TRUE

## 2025-03-06 SDOH — SOCIAL STABILITY: SOCIAL INSECURITY: WITHIN THE LAST YEAR, HAVE YOU BEEN AFRAID OF YOUR PARTNER OR EX-PARTNER?: NO

## 2025-03-06 SDOH — SOCIAL STABILITY: SOCIAL INSECURITY: HAVE YOU HAD THOUGHTS OF HARMING ANYONE ELSE?: NO

## 2025-03-06 SDOH — SOCIAL STABILITY: SOCIAL INSECURITY: HAS ANYONE EVER THREATENED TO HURT YOUR FAMILY OR YOUR PETS?: NO

## 2025-03-06 SDOH — ECONOMIC STABILITY: FOOD INSECURITY: WITHIN THE PAST 12 MONTHS, YOU WORRIED THAT YOUR FOOD WOULD RUN OUT BEFORE YOU GOT THE MONEY TO BUY MORE.: NEVER TRUE

## 2025-03-06 SDOH — SOCIAL STABILITY: SOCIAL INSECURITY: ARE THERE ANY APPARENT SIGNS OF INJURIES/BEHAVIORS THAT COULD BE RELATED TO ABUSE/NEGLECT?: NO

## 2025-03-06 SDOH — ECONOMIC STABILITY: HOUSING INSECURITY: IN THE PAST 12 MONTHS, HOW MANY TIMES HAVE YOU MOVED WHERE YOU WERE LIVING?: 0

## 2025-03-06 SDOH — SOCIAL STABILITY: SOCIAL INSECURITY: WERE YOU ABLE TO COMPLETE ALL THE BEHAVIORAL HEALTH SCREENINGS?: YES

## 2025-03-06 SDOH — ECONOMIC STABILITY: FOOD INSECURITY: HOW HARD IS IT FOR YOU TO PAY FOR THE VERY BASICS LIKE FOOD, HOUSING, MEDICAL CARE, AND HEATING?: NOT HARD AT ALL

## 2025-03-06 SDOH — SOCIAL STABILITY: SOCIAL INSECURITY: ARE YOU OR HAVE YOU BEEN THREATENED OR ABUSED PHYSICALLY, EMOTIONALLY, OR SEXUALLY BY ANYONE?: NO

## 2025-03-06 SDOH — ECONOMIC STABILITY: INCOME INSECURITY: IN THE PAST 12 MONTHS HAS THE ELECTRIC, GAS, OIL, OR WATER COMPANY THREATENED TO SHUT OFF SERVICES IN YOUR HOME?: NO

## 2025-03-06 SDOH — SOCIAL STABILITY: SOCIAL INSECURITY: DOES ANYONE TRY TO KEEP YOU FROM HAVING/CONTACTING OTHER FRIENDS OR DOING THINGS OUTSIDE YOUR HOME?: NO

## 2025-03-06 SDOH — SOCIAL STABILITY: SOCIAL INSECURITY: ABUSE: ADULT

## 2025-03-06 SDOH — ECONOMIC STABILITY: TRANSPORTATION INSECURITY: IN THE PAST 12 MONTHS, HAS LACK OF TRANSPORTATION KEPT YOU FROM MEDICAL APPOINTMENTS OR FROM GETTING MEDICATIONS?: NO

## 2025-03-06 ASSESSMENT — PAIN - FUNCTIONAL ASSESSMENT
PAIN_FUNCTIONAL_ASSESSMENT: 0-10

## 2025-03-06 ASSESSMENT — ACTIVITIES OF DAILY LIVING (ADL)
WALKS IN HOME: INDEPENDENT
PATIENT'S MEMORY ADEQUATE TO SAFELY COMPLETE DAILY ACTIVITIES?: YES
GROOMING: INDEPENDENT
HEARING - RIGHT EAR: FUNCTIONAL
JUDGMENT_ADEQUATE_SAFELY_COMPLETE_DAILY_ACTIVITIES: YES
LACK_OF_TRANSPORTATION: NO
TOILETING: INDEPENDENT
FEEDING YOURSELF: INDEPENDENT
LACK_OF_TRANSPORTATION: NO
BATHING: INDEPENDENT
ADEQUATE_TO_COMPLETE_ADL: YES
ASSISTIVE_DEVICE: WALKER
HEARING - LEFT EAR: FUNCTIONAL
LACK_OF_TRANSPORTATION: NO
DRESSING YOURSELF: INDEPENDENT

## 2025-03-06 ASSESSMENT — PAIN DESCRIPTION - ORIENTATION
ORIENTATION: LEFT
ORIENTATION: LEFT

## 2025-03-06 ASSESSMENT — COGNITIVE AND FUNCTIONAL STATUS - GENERAL
PATIENT BASELINE BEDBOUND: NO
MOBILITY SCORE: 23
MOBILITY SCORE: 22
WALKING IN HOSPITAL ROOM: A LITTLE
DAILY ACTIVITIY SCORE: 24
CLIMB 3 TO 5 STEPS WITH RAILING: A LITTLE
DAILY ACTIVITIY SCORE: 24
CLIMB 3 TO 5 STEPS WITH RAILING: A LITTLE

## 2025-03-06 ASSESSMENT — LIFESTYLE VARIABLES
AUDIT-C TOTAL SCORE: 1
HOW MANY STANDARD DRINKS CONTAINING ALCOHOL DO YOU HAVE ON A TYPICAL DAY: 1 OR 2
SKIP TO QUESTIONS 9-10: 1
AUDIT-C TOTAL SCORE: 1
HOW OFTEN DO YOU HAVE A DRINK CONTAINING ALCOHOL: MONTHLY OR LESS
HOW OFTEN DO YOU HAVE 6 OR MORE DRINKS ON ONE OCCASION: NEVER

## 2025-03-06 ASSESSMENT — PAIN SCALES - GENERAL
PAINLEVEL_OUTOF10: 7
PAINLEVEL_OUTOF10: 6
PAINLEVEL_OUTOF10: 5 - MODERATE PAIN
PAINLEVEL_OUTOF10: 3
PAINLEVEL_OUTOF10: 7
PAINLEVEL_OUTOF10: 3
PAINLEVEL_OUTOF10: 9

## 2025-03-06 ASSESSMENT — PAIN DESCRIPTION - LOCATION
LOCATION: INCISION
LOCATION: INCISION

## 2025-03-06 NOTE — DISCHARGE SUMMARY
Discharge Diagnosis  Urge incontinence, sacral lead replacement    Issues Requiring Follow-Up  FOLLOW UP oupatient with Dr Gant  Itchluciana call PCP if persists    Test Results Pending At Discharge  Pending Labs       No current pending labs.            Hospital Course  74 yr old male with multiple medical problems, urge incontinence s/p Insertion Continence Control Stimulator Sacral Lead ~ Stage 1 on 2/12 with lead displacement, now POD #1 from a neurostimulator insertion (Intraoperative Findings: Routine 2nd stage axonic implantation in left buttock). Dr. Gant. Please see operative report for full details. Patient tolerated the procedure well and recovered briefly in PACU before being transitioned to regular nursing floor. Post-op course was uncomplicated. Diet was advanced as tolerated. No n/v, passing gas. IV medication transitioned to oral as diet advanced. On the day of discharge, the pt was tolerating a diet, pain was controlled on PO pain medication, and he was ambulating with rolator and voiding spontaneously. He was complaining of itching on face and upper torso. He had been medicated with 2 doses of IV diphenhydramine with small relief. He discharged to home in stable condition with instructions to follow up as outpatient.    Pertinent Physical Exam At Time of Discharge  Physical ExamConstitutional:       Appearance: Normal appearance.   Cardiovascular:      Rate and Rhythm: Normal rate and regular rhythm.   Pulmonary:      Effort: Pulmonary effort is normal.      Comments: Non labored breathing on RA  Abdominal:      General: There is no distension.      Palpations: Abdomen is soft.   Genitourinary:     Comments: Voiding indepentdently  Musculoskeletal:      Comments: Lap site left sacrum, C/D/I, edges well approximated, covered in Dermabond across the lower back.   Skin:     General: Skin is warm and dry. No rash, but skin reddened on face and upper back.   Neurological:      General: No focal deficit  present.      Mental Status: He is alert and oriented to person, place, and time. Mental status is at baseline.   Psychiatric:         Attention and Perception: Attention normal.         Mood and Affect: Mood normal.         Speech: Speech normal.         Behavior: Behavior normal.         Cognition and Memory: Cognition normal.       Home Medications     Medication List      START taking these medications     polyethylene glycol 17 gram packet; Commonly known as: Glycolax,   Miralax; Take 17 g by mouth once daily as needed (constipation). As needed   for constipation   traMADol 50 mg tablet; Commonly known as: Ultram; Take 1 tablet (50 mg)   by mouth every 8 hours if needed (post op pain) for up to 3 days.     CONTINUE taking these medications     acetaminophen 500 mg tablet; Commonly known as: Tylenol   acyclovir 800 mg tablet; Commonly known as: Zovirax; TAKE 1 TABLET EVERY   DAY   Blood glucose monitoring meter; Commonly known as: OneTouch Ultra2   Meter; 1 each if needed (check sugar every morning;). Check sugar every   morning; dispense onetouch ultra meter   cholecalciferol 50 MCG (2000 UT) tablet; Commonly known as: Vitamin D-3   cloNIDine 0.3 mg tablet; Commonly known as: Catapres; TAKE 1 TABLET   TWICE DAILY   cyanocobalamin 500 mcg tablet; Commonly known as: Vitamin B-12   latanoprost 0.005 % ophthalmic solution; Commonly known as: Xalatan;   Administer 1 drop into both eyes once daily at bedtime.   lisinopril 40 mg tablet; TAKE 1 TABLET EVERY DAY   magnesium oxide 400 mg tablet; Commonly known as: Mag-Ox   melatonin 3 mg tablet   METFORMIN ORAL   montelukast 10 mg tablet; Commonly known as: Singulair; TAKE 1 TABLET   EVERY DAY   oxybutynin XL 10 mg 24 hr tablet; Commonly known as: Ditropan-XL; TAKE 1   TABLET EVERY DAY   pantoprazole 20 mg EC tablet; Commonly known as: ProtoNix; TAKE 1 TABLET   EVERY DAY   rosuvastatin 10 mg tablet; Commonly known as: Crestor; TAKE 1 TABLET   EVERY DAY   tiZANidine 4 mg  tablet; Commonly known as: Zanaflex; TAKE 1 TABLET THREE   TIMES DAILY   Ultra-Light Rollator misc; Generic drug: walker; Use for ambulation     ASK your doctor about these medications     albuterol 2.5 mg /3 mL (0.083 %) nebulizer solution; Take 3 mL (2.5 mg)   by nebulization every 6 hours if needed for wheezing.   clotrimazole 1 % cream; Commonly known as: Lotrimin; Apply topically 2   times a day. apply to affected area   dapagliflozin propanediol 10 mg; Commonly known as: Farxiga; Take 1   tablet (10 mg) by mouth once daily.       Outpatient Follow-Up  Future Appointments   Date Time Provider Department Center   4/1/2025 11:30 AM Dick Gant MD [facility]   4/18/2025  2:15 PM Sunny Moraes MD [id number] [facility]   5/23/2025  2:30 PM Manny Chandra APRN-CNP [id number] [place]   6/13/2025  3:15 PM Gloria Boone MD [id number] [place]     Discharge time 35 minutes     Shayne Stewart APRN-CNP

## 2025-03-06 NOTE — CARE PLAN
The patient's goals for the shift include safety    The clinical goals for the shift include monitor vital signs    Over the shift, the patient did not make progress toward the following goals. Barriers to progression include education. Recommendations to address these barriers include safety.

## 2025-03-06 NOTE — NURSING NOTE
Discharge instructions provided using teachback method.  Patient's health-related risk factors discussed with patient.  Patient educated to look for worsening signs and symptoms and educated to seek medical attention if experiencing medical emergency.  Patient aware of needs to follow up with outpatient clinics as scheduled. Home going meds reviewed with patient.  Patient verbalized understanding of disposition and discharge instructions.  All questions answered to patient's satisfaction and within nursing scope of practice.  Vitals refused; IV(s) removed previously.

## 2025-03-06 NOTE — POST-PROCEDURE NOTE
Mr. Larson is a 74 year old male who is POD #0 from a neurostimulator insertion (Intraoperative Findings: Routine 2nd stage axonic implantation in left buttock). Does report some discomfort to the incision site. Denies nausea or vomiting - was able to eat dinner without issue. Has been able to urinate (reports he has a history of urinating small amounts, frequency and urgency - is still having those symptoms but to a lesser effect). Denies hematuria.    PE:  Constitutional: A&Ox3, calm and cooperative, NAD.  Eyes: PERRL, clear sclera.  ENMT: Moist mucous membranes.  Head/Neck: Neck supple.  Cardiovascular: Normal rate.  Respiratory/Thorax: Unlabored breathing.  Genitourinary: Voiding independently without difficulty. Incision to upper left buttock C/D/I with Dermabond - patient agreeable to inspection of incision.   Musculoskeletal: ROM intact.  Neurological: A&Ox3, No focal deficits.  Psychological: Appropriate mood and behavior.  Skin: Warm and dry.    Radiology and labs reviewed. VSS, afebrile.    Plan:   - Diet: Regular  - Continue current pain regimen   - PRN antiemetic   - DVT Proph: SCDs/ ambulate/ Lovenox  - Bowel regimen: Miralax  - Monitor VS every 4 hours   - Labs ordered for AM   - IS every hour while awake   - Encourage ambulation / OOB as tolerated   - Monitor and record urine output - will obtain PVRs  - Monitor surgical site for drainage, erythema, excessive bruising   - F/u with Dr. Gant outpatient once d/c from hospital     Dispo: Pain and nausea control if needed. OOB as able. Monitor and record urine output. Anticipate DC tomorrow.    Total time spent 25 minutes, and greater than 50% of time was spent in counseling/coordination of care.

## 2025-03-06 NOTE — PROGRESS NOTES
03/06/25 1108   Discharge Planning   Living Arrangements Alone   Support Systems Friends/neighbors   Assistance Needed independent at baseline   Type of Residence Private residence   Do you have animals or pets at home? Yes   Type of Animals or Pets dog, Luan, alayna   Who is requesting discharge planning? Provider   Home or Post Acute Services None   Expected Discharge Disposition Home   Does the patient need discharge transport arranged? No   Financial Resource Strain   How hard is it for you to pay for the very basics like food, housing, medical care, and heating? Not hard   Housing Stability   In the last 12 months, was there a time when you were not able to pay the mortgage or rent on time? N   In the past 12 months, how many times have you moved where you were living? 0   At any time in the past 12 months, were you homeless or living in a shelter (including now)? N   Transportation Needs   In the past 12 months, has lack of transportation kept you from medical appointments or from getting medications? no   In the past 12 months, has lack of transportation kept you from meetings, work, or from getting things needed for daily living? No     Met with patient at bedside  Explained role TCC  Patient admitted for scheduled procedure for insertion of neurostimulator    Pcp is Dr Olivares  [facility]  Lives alone with dog  Uses a rollator at baseline  Independent with care (uses the rollator in and out of home)  Drives at baseline  Has transport to appts, meds, food,  if not feeling well to drive  Has transport home today and said he was told by provider that plan is for dc around 3p today    ADOD today  BARRIERS dc orders, med clearance, transport  DISPO home no needs

## 2025-03-06 NOTE — CARE PLAN
The patient's goals for the shift include pain control and urination    The clinical goals for the shift include Pt will rate pain as 3/10 or lower this shift      Problem: Diabetes  Goal: Maintain glucose levels >70mg/dl to <250mg/dl throughout shift  Outcome: Progressing  Goal: No changes in neurological exam by end of shift  Outcome: Progressing     Problem: Safety - Adult  Goal: Free from fall injury  Outcome: Progressing     Problem: Chronic Conditions and Co-morbidities  Goal: Patient's chronic conditions and co-morbidity symptoms are monitored and maintained or improved  Outcome: Progressing     Problem: Nutrition  Goal: Nutrient intake appropriate for maintaining nutritional needs  Outcome: Progressing     Problem: Pain  Goal: Takes deep breaths with improved pain control throughout the shift  Outcome: Progressing  Goal: Turns in bed with improved pain control throughout the shift  Outcome: Progressing  Goal: Walks with improved pain control throughout the shift  Outcome: Progressing  Goal: Performs ADL's with improved pain control throughout shift  Outcome: Progressing  Goal: Free from opioid side effects throughout the shift  Outcome: Progressing

## 2025-03-12 PROCEDURE — 93005 ELECTROCARDIOGRAM TRACING: CPT

## 2025-03-13 LAB
ATRIAL RATE: 85 BPM
P AXIS: 73 DEGREES
P OFFSET: 186 MS
P ONSET: 114 MS
PR INTERVAL: 212 MS
Q ONSET: 220 MS
QRS COUNT: 14 BEATS
QRS DURATION: 88 MS
QT INTERVAL: 398 MS
QTC CALCULATION(BAZETT): 473 MS
QTC FREDERICIA: 447 MS
R AXIS: 46 DEGREES
T AXIS: 64 DEGREES
T OFFSET: 419 MS
VENTRICULAR RATE: 85 BPM

## 2025-03-17 DIAGNOSIS — K21.9 GASTROESOPHAGEAL REFLUX DISEASE WITHOUT ESOPHAGITIS: ICD-10-CM

## 2025-03-18 RX ORDER — PANTOPRAZOLE SODIUM 20 MG/1
20 TABLET, DELAYED RELEASE ORAL DAILY
Qty: 90 TABLET | Refills: 3 | Status: SHIPPED | OUTPATIENT
Start: 2025-03-18

## 2025-03-21 ENCOUNTER — APPOINTMENT (OUTPATIENT)
Dept: OPHTHALMOLOGY | Facility: CLINIC | Age: 75
End: 2025-03-21
Payer: MEDICARE

## 2025-04-01 ENCOUNTER — APPOINTMENT (OUTPATIENT)
Dept: UROLOGY | Facility: HOSPITAL | Age: 75
End: 2025-04-01
Payer: MEDICARE

## 2025-04-02 ENCOUNTER — APPOINTMENT (OUTPATIENT)
Dept: ORTHOPEDIC SURGERY | Facility: CLINIC | Age: 75
End: 2025-04-02
Payer: MEDICARE

## 2025-04-18 ENCOUNTER — APPOINTMENT (OUTPATIENT)
Dept: RADIOLOGY | Facility: CLINIC | Age: 75
End: 2025-04-18
Payer: MEDICARE

## 2025-04-18 ENCOUNTER — APPOINTMENT (OUTPATIENT)
Dept: ORTHOPEDIC SURGERY | Facility: CLINIC | Age: 75
End: 2025-04-18
Payer: MEDICARE

## 2025-04-18 DIAGNOSIS — Z96.652 S/P TOTAL KNEE ARTHROPLASTY, LEFT: ICD-10-CM

## 2025-04-20 LAB
BASOPHILS # BLD AUTO: 9 CELLS/UL (ref 0–200)
BASOPHILS NFR BLD AUTO: 0.2 %
COBALT SERPL-MCNC: NORMAL UG/L
CR SERPL-MCNC: 1 MCG/L
CRP SERPL-MCNC: <3 MG/L
EOSINOPHIL # BLD AUTO: 198 CELLS/UL (ref 15–500)
EOSINOPHIL NFR BLD AUTO: 4.5 %
ERYTHROCYTE [DISTWIDTH] IN BLOOD BY AUTOMATED COUNT: 12.5 % (ref 11–15)
ERYTHROCYTE [SEDIMENTATION RATE] IN BLOOD BY WESTERGREN METHOD: 14 MM/H
HCT VFR BLD AUTO: 41.7 % (ref 38.5–50)
HGB BLD-MCNC: 14.2 G/DL (ref 13.2–17.1)
LYMPHOCYTES # BLD AUTO: 1175 CELLS/UL (ref 850–3900)
LYMPHOCYTES NFR BLD AUTO: 26.7 %
MCH RBC QN AUTO: 34.2 PG (ref 27–33)
MCHC RBC AUTO-ENTMCNC: 34.1 G/DL (ref 32–36)
MCV RBC AUTO: 100.5 FL (ref 80–100)
MONOCYTES # BLD AUTO: 211 CELLS/UL (ref 200–950)
MONOCYTES NFR BLD AUTO: 4.8 %
NEUTROPHILS # BLD AUTO: 2807 CELLS/UL (ref 1500–7800)
NEUTROPHILS NFR BLD AUTO: 63.8 %
PLATELET # BLD AUTO: 176 THOUSAND/UL (ref 140–400)
PMV BLD REES-ECKER: 11.6 FL (ref 7.5–12.5)
RBC # BLD AUTO: 4.15 MILLION/UL (ref 4.2–5.8)
WBC # BLD AUTO: 4.4 THOUSAND/UL (ref 3.8–10.8)

## 2025-04-22 LAB
BASOPHILS # BLD AUTO: 9 CELLS/UL (ref 0–200)
BASOPHILS NFR BLD AUTO: 0.2 %
COBALT SERPL-MCNC: 0.8 MCG/L (ref 0.1–0.4)
CR SERPL-MCNC: 1 MCG/L
CRP SERPL-MCNC: <3 MG/L
EOSINOPHIL # BLD AUTO: 198 CELLS/UL (ref 15–500)
EOSINOPHIL NFR BLD AUTO: 4.5 %
ERYTHROCYTE [DISTWIDTH] IN BLOOD BY AUTOMATED COUNT: 12.5 % (ref 11–15)
ERYTHROCYTE [SEDIMENTATION RATE] IN BLOOD BY WESTERGREN METHOD: 14 MM/H
HCT VFR BLD AUTO: 41.7 % (ref 38.5–50)
HGB BLD-MCNC: 14.2 G/DL (ref 13.2–17.1)
LYMPHOCYTES # BLD AUTO: 1175 CELLS/UL (ref 850–3900)
LYMPHOCYTES NFR BLD AUTO: 26.7 %
MCH RBC QN AUTO: 34.2 PG (ref 27–33)
MCHC RBC AUTO-ENTMCNC: 34.1 G/DL (ref 32–36)
MCV RBC AUTO: 100.5 FL (ref 80–100)
MONOCYTES # BLD AUTO: 211 CELLS/UL (ref 200–950)
MONOCYTES NFR BLD AUTO: 4.8 %
NEUTROPHILS # BLD AUTO: 2807 CELLS/UL (ref 1500–7800)
NEUTROPHILS NFR BLD AUTO: 63.8 %
PLATELET # BLD AUTO: 176 THOUSAND/UL (ref 140–400)
PMV BLD REES-ECKER: 11.6 FL (ref 7.5–12.5)
RBC # BLD AUTO: 4.15 MILLION/UL (ref 4.2–5.8)
WBC # BLD AUTO: 4.4 THOUSAND/UL (ref 3.8–10.8)

## 2025-04-29 ENCOUNTER — OFFICE VISIT (OUTPATIENT)
Dept: UROLOGY | Facility: HOSPITAL | Age: 75
End: 2025-04-29
Payer: MEDICARE

## 2025-04-29 DIAGNOSIS — Z96.0 S/P INSERTION OF PENILE IMPLANT: ICD-10-CM

## 2025-04-29 DIAGNOSIS — N32.81 OVERACTIVE BLADDER: Primary | ICD-10-CM

## 2025-04-29 DIAGNOSIS — N39.41 URGE INCONTINENCE OF URINE: ICD-10-CM

## 2025-04-29 PROCEDURE — 3044F HG A1C LEVEL LT 7.0%: CPT | Performed by: UROLOGY

## 2025-04-29 PROCEDURE — 95972 ALYS CPLX SP/PN NPGT W/PRGRM: CPT | Performed by: UROLOGY

## 2025-04-29 PROCEDURE — 99214 OFFICE O/P EST MOD 30 MIN: CPT | Performed by: UROLOGY

## 2025-04-29 PROCEDURE — 4010F ACE/ARB THERAPY RXD/TAKEN: CPT | Performed by: UROLOGY

## 2025-04-29 PROCEDURE — 1159F MED LIST DOCD IN RCRD: CPT | Performed by: UROLOGY

## 2025-04-29 PROCEDURE — 1036F TOBACCO NON-USER: CPT | Performed by: UROLOGY

## 2025-04-29 PROCEDURE — G2211 COMPLEX E/M VISIT ADD ON: HCPCS | Performed by: UROLOGY

## 2025-04-29 PROCEDURE — G8433 SCR FOR DEP NOT CPT DOC RSN: HCPCS | Performed by: UROLOGY

## 2025-04-29 NOTE — PROGRESS NOTES
"FUV    HISTORY OF PRESENT ILLNESS:   Elliot Larson is a 75 y.o. male who is being seen today for fuv    h/o UTIs/prostatitis, BPH s/p Urolift, h/o ED, s/p IPP     Patient underwent IPP placement on 12/20/19. AMS Cx 18+3, MS pump, 100cc conceal reservoir.      Pt with bothersome urge incontinence. Stream ok at times. PVRs have been low. UA's negative except for glucose.      Trial of mybetriq at prior visit, also tried gemteza. Currently on oxybutynin     Cysto (1.14.22) - no strictures, non obstructive prostate, normal bladder    Has fallen and broke his femur since last visit.  Very poor mobility.      Pt had Axonics PNE on 9/6/24.  Inconclusive results    Pt Stage 1 in OR on 2/12/25 - Improvement in symptoms - less urgency and frequency, less leakage. >50% improvement    Stage 2 on 3/5/25    4/29/25 - no major change in symptoms    PAST MEDICAL HISTORY:  Past Medical History:   Diagnosis Date    Adverse effect of anesthesia     After hip surgery \"took 3 days to come off breathing machine\". emergence deliruim- becomes very violent    Anemia     11/21/24 H/h  13.7/39.1 Follows with heme/onc Manny Chandra, APRN-CNP LOV 5/1/24    Asthma     last exacerbation in Dec with URI- alb neb used    Chronic bronchitis (Multi)     Closed fracture dislocation of cervical spine     fusion    Delayed emergence from general anesthesia     Depression     Dyslipidemia     Elevated coronary artery calcium score     CT 1/26/24  272.59    Enlarged prostate with lower urinary tract symptoms (LUTS)     GERD (gastroesophageal reflux disease)     under control    Hepatitis B carrier (Multi)     History of cardiovascular stress test 05/22/2024    normal    History of central retinal vein occlusion 2000    right eye    Hypertension     Legally blind     right eye    Lung nodule     CT 4/17/24 No suspicious pulmonary nodules identified.    OAB (overactive bladder)     Osteoarthritis     Postlaminectomy syndrome, lumbar     Thoracic " ascending aortic aneurysm     CT 1/26/24 3.9 cm and unchanged from 2018    Type 2 diabetes mellitus     9/19/24 A1c 7.6    Urge incontinence     Urinary tract infection        PAST SURGICAL HISTORY:  Past Surgical History:   Procedure Laterality Date    CATARACT EXTRACTION Bilateral     CERVICAL LAMINECTOMY      COLONOSCOPY      GASTRIC BYPASS      Gastric Surgery For Morbid Obesity Gastric Bypass    HERNIA REPAIR Left     Inguinal Hernia Repair x 2    LUMBAR FUSION      Lumbar Vertebral Fusion    LUMBAR LAMINECTOMY      MR HEAD ANGIO WO IV CONTRAST  04/05/2021    MR HEAD ANGIO WO IV CONTRAST 4/5/2021 [facility]    ORIF HIP FRACTURE Left 06/25/2023    ORIF HIP PROXIMAL END, NECK, SLIDING HIP SCREW    OTHER SURGICAL HISTORY Left 02/01/2022    REVISION JOINT TOTAL KNEE FEMORAL AND ENTIRE TIBIAL COMPONENT    PENILE PROSTHESIS IMPLANT      SEPTOPLASTY      TOTAL KNEE ARTHROPLASTY Bilateral     VITRECTOMY          ALLERGIES:   Allergies   Allergen Reactions    Ciprofloxacin Itching    Ketorolac Rash    Latex Itching    Penicillins Other     Skin sloughing        MEDICATIONS:   Current Outpatient Medications   Medication Instructions    acetaminophen (TYLENOL) 1,000 mg, Every 8 hours PRN    acyclovir (ZOVIRAX) 800 mg, oral, Daily    albuterol 2.5 mg, nebulization, Every 6 hours PRN    Blood glucose monitoring meter kit (OneTouch Ultra2 Meter) kit 1 each, miscellaneous, As needed, Check sugar every morning; dispense onetouch ultra meter    cholecalciferol (Vitamin D-3) 50 MCG (2000 UT) tablet 2 tablets, 2 times daily    cloNIDine (CATAPRES) 0.3 mg, oral, 2 times daily    cyanocobalamin (VITAMIN B-12) 500 mcg, Daily RT    latanoprost (Xalatan) 0.005 % ophthalmic solution 1 drop, Both Eyes, Nightly    lisinopril 40 mg, oral, Daily    magnesium oxide (MAG-OX) 400 mg, Daily    melatonin 3 mg tablet 1 tablet, Nightly    metformin HCl (METFORMIN ORAL) 4 tablets, Daily    montelukast (SINGULAIR) 10 mg, oral, Daily     oxybutynin XL (Ditropan-XL) 10 mg 24 hr tablet TAKE 1 TABLET EVERY DAY    pantoprazole (PROTONIX) 20 mg, oral, Daily    polyethylene glycol (GLYCOLAX, MIRALAX) 17 g, oral, Daily PRN, As needed for constipation    rosuvastatin (CRESTOR) 10 mg, oral, Daily    tiZANidine (ZANAFLEX) 4 mg, oral, 3 times daily    walker (Ultra-Light Rollator) misc Use for ambulation        PHYSICAL EXAM:  There were no vitals taken for this visit.  Constitutional: Patient appears well-developed and well-nourished. No distress.      Labs  Lab Results   Component Value Date    TESTOSTERONE 399 08/28/2020    TESTOSTERONE 263 08/26/2020     Lab Results   Component Value Date    PSA 1.49 09/19/2024     Lab Results   Component Value Date    GFRMALE 81 09/08/2023     Lab Results   Component Value Date    CREATININE 0.96 03/06/2025     Lab Results   Component Value Date    CHOL 131 09/19/2024     Lab Results   Component Value Date    HDL 57.1 09/19/2024     Lab Results   Component Value Date    CHHDL 2.3 09/19/2024     Lab Results   Component Value Date    LDLF 34 09/08/2023     Lab Results   Component Value Date    VLDL 27 09/19/2024     Lab Results   Component Value Date    TRIG 133 09/19/2024     Lab Results   Component Value Date    HGBA1C 6.2 (H) 02/06/2025     Lab Results   Component Value Date    HCT 41.7 04/17/2025     PVR 23cc        Assessment:      1. Overactive bladder        2. Urge incontinence of urine  Measure post void residual      3. S/P insertion of penile implant            Elliot Larson is a 75 y.o. male here for FUV    S/p Axonics NM     Plan:   Programmed changed  Conservative measures    FU in 6m, sooner if needed

## 2025-05-23 ENCOUNTER — APPOINTMENT (OUTPATIENT)
Dept: HEMATOLOGY/ONCOLOGY | Facility: CLINIC | Age: 75
End: 2025-05-23
Payer: MEDICARE

## 2025-05-27 ENCOUNTER — HOSPITAL ENCOUNTER (OUTPATIENT)
Dept: RADIOLOGY | Facility: CLINIC | Age: 75
Discharge: HOME | End: 2025-05-27
Payer: MEDICARE

## 2025-05-27 ENCOUNTER — APPOINTMENT (OUTPATIENT)
Dept: ORTHOPEDIC SURGERY | Facility: CLINIC | Age: 75
End: 2025-05-27
Payer: MEDICARE

## 2025-05-27 DIAGNOSIS — Z96.659 FAILED TOTAL KNEE REPLACEMENT, INITIAL ENCOUNTER: Primary | ICD-10-CM

## 2025-05-27 DIAGNOSIS — Z96.652 S/P TOTAL KNEE ARTHROPLASTY, LEFT: ICD-10-CM

## 2025-05-27 DIAGNOSIS — T84.018A FAILED TOTAL KNEE REPLACEMENT, INITIAL ENCOUNTER: Primary | ICD-10-CM

## 2025-05-27 PROCEDURE — 73562 X-RAY EXAM OF KNEE 3: CPT | Mod: LEFT SIDE | Performed by: RADIOLOGY

## 2025-05-27 PROCEDURE — 4010F ACE/ARB THERAPY RXD/TAKEN: CPT | Performed by: STUDENT IN AN ORGANIZED HEALTH CARE EDUCATION/TRAINING PROGRAM

## 2025-05-27 PROCEDURE — 73562 X-RAY EXAM OF KNEE 3: CPT | Mod: LT

## 2025-05-27 PROCEDURE — 73552 X-RAY EXAM OF FEMUR 2/>: CPT | Mod: LEFT SIDE | Performed by: RADIOLOGY

## 2025-05-27 PROCEDURE — 73552 X-RAY EXAM OF FEMUR 2/>: CPT | Mod: LT

## 2025-05-27 PROCEDURE — 1159F MED LIST DOCD IN RCRD: CPT | Performed by: STUDENT IN AN ORGANIZED HEALTH CARE EDUCATION/TRAINING PROGRAM

## 2025-05-27 PROCEDURE — 99214 OFFICE O/P EST MOD 30 MIN: CPT | Performed by: STUDENT IN AN ORGANIZED HEALTH CARE EDUCATION/TRAINING PROGRAM

## 2025-05-27 PROCEDURE — 3044F HG A1C LEVEL LT 7.0%: CPT | Performed by: STUDENT IN AN ORGANIZED HEALTH CARE EDUCATION/TRAINING PROGRAM

## 2025-05-27 PROCEDURE — 99204 OFFICE O/P NEW MOD 45 MIN: CPT | Performed by: STUDENT IN AN ORGANIZED HEALTH CARE EDUCATION/TRAINING PROGRAM

## 2025-05-27 PROCEDURE — 1036F TOBACCO NON-USER: CPT | Performed by: STUDENT IN AN ORGANIZED HEALTH CARE EDUCATION/TRAINING PROGRAM

## 2025-05-27 NOTE — PROGRESS NOTES
PRIMARY CARE PHYSICIAN: Libra Corbett MD  REFERRING PROVIDER: No referring provider defined for this encounter.       SUBJECTIVE  CHIEF COMPLAINT:   Chief Complaint   Patient presents with    Left Knee - New Patient Visit, Pain        HPI: Elliot Larson is a pleasant 75 y.o. year-old male who is seen today for evaluation of left knee pain.  Patient is a very complex surgical history with respect to his left knee.  His initial total knee replacement was done by Dr. Erazo.  Unfortunately, postoperatively, the patient sustained a fall with a distal femoral fracture.  He eventually underwent a distal femoral replacement.  This was done by Dr. Henry in February 2022.  The patient subsequently fell and fractured his left hip.  This was treated with open reduction internal fixation with a sliding hip screw.  This was in June 2023.    Patient presents today frustrated with his current level of pain and disability.  He has significant pain with weightbearing.  He states that the pain is worse after he takes the first few steps.  He has some pain at rest but it is mostly with activity.  He also complains of significant instability and laxity within the knee.  He feels that he is going to fall at any moment.  He has difficulty taking a shower and performing other activities of daily living due to his instability and pain.  He uses a rollator.  He is unable to walk his dog.  He is referred to me for a second opinion.    REVIEW OF SYSTEMS  The patient denies any fever, chills, chest pain, shortness of breath or difficulty breathing.  Patient denies any numbness, tingling, or radicular symptoms.  Adult patient history sheet was filled out by the patient today in clinic and will be scanned into the EMR.  I personally reviewed this form which will be scanned into the EMR.  This includes Past Medical History, Past Surgical History, Medications, Allergies, Social History, Family History and 12 point review of  systems.    Medical History[1]     Allergies[2]     Surgical History[3]     Family History[4]     Social History     Socioeconomic History    Marital status: Single     Spouse name: Not on file    Number of children: Not on file    Years of education: Not on file    Highest education level: Not on file   Occupational History    Not on file   Tobacco Use    Smoking status: Former     Current packs/day: 0.00     Average packs/day: 2.0 packs/day for 27.0 years (54.0 ttl pk-yrs)     Types: Cigarettes     Start date:      Quit date:      Years since quittin.4     Passive exposure: Past    Smokeless tobacco: Never   Vaping Use    Vaping status: Never Used   Substance and Sexual Activity    Alcohol use: Yes     Alcohol/week: 14.0 standard drinks of alcohol     Types: 14 Glasses of wine per week     Comment: 2 glasses/day    Drug use: Yes     Types: Marijuana     Comment: smokes daily, pipe    Sexual activity: Defer   Other Topics Concern    Not on file   Social History Narrative    Not on file     Social Drivers of Health     Financial Resource Strain: Low Risk  (3/6/2025)    Overall Financial Resource Strain (CARDIA)     Difficulty of Paying Living Expenses: Not hard at all   Food Insecurity: No Food Insecurity (3/6/2025)    Hunger Vital Sign     Worried About Running Out of Food in the Last Year: Never true     Ran Out of Food in the Last Year: Never true   Transportation Needs: No Transportation Needs (3/6/2025)    PRAPARE - Transportation     Lack of Transportation (Medical): No     Lack of Transportation (Non-Medical): No   Physical Activity: Not on file   Stress: Not on file   Social Connections: Not on file   Intimate Partner Violence: Not At Risk (3/6/2025)    Humiliation, Afraid, Rape, and Kick questionnaire     Fear of Current or Ex-Partner: No     Emotionally Abused: No     Physically Abused: No     Sexually Abused: No   Housing Stability: Low Risk  (3/6/2025)    Housing Stability Vital Sign      Unable to Pay for Housing in the Last Year: No     Number of Times Moved in the Last Year: 0     Homeless in the Last Year: No        CURRENT MEDICATIONS:   Current Medications[5]     OBJECTIVE    PHYSICAL EXAM  There is no height or weight on file to calculate BMI.    General: Well-appearing male in no acute distress.  Awake, alert and oriented.  Pleasant and cooperative.  Respiratory: Non-labored breathing  Mood: Euthymic   Gait: Significant antalgia  Assistive Device: Rollator    Affected Left Knee  Limb Alignment: Neutral  ROM: 5-95  Significant laxity with valgus stress.  Some laxity with varus stress but not to the same extent.  Skin: Intact, no abrasions or draining sinuses  Effusion: None  Quad Strength: 5/5  Hamstring Strength: 3/5  Patella tracks centrally  Tenderness: Diffuse tenderness about the knee  Sensation: Intact to light touch distally  Motor function: Able to fire TA, EHL, G/S  Pulses: Palpable DP pulse    Patient does have a 1.5 cm limb length discrepancy with the right leg longer than the left.    IMAGING:  Radiographs of the left hip and femur were obtained and independently reviewed.  I discussed the results with the patient.  The patient has a Yomi GRS distal femoral replacement with a revision tibial baseplate in addition to a cone.  It appears that the distal femur is loose and has telescoped proximally.  The cement mantle has fractured.  There appears to be extensive osteolysis and bone loss within the proximal femur.  His sliding hip screw is in place.  Fracture appears to be well-healed.      ASSESSMENT & PLAN    IMPRESSION:  Elliot Larson is a 75 y.o. male presenting today for a second opinion regarding his failed left distal femoral replacement.  It is my impression that the patient's implant is loose.  He is having disabling pain.  He also has significant instability which is not expected given the fact that he has a hinge distal femoral replacement.  Therefore, it is my  "impression that the patient's laxity is a result of the prosthesis migrating within the canal of the femur and less coming from the actual joint itself.    PLAN:  I would like to obtain his previous radiographs from the [facility].  This will help to confirm that the implant is in fact loose and subsided.    I do lengthy discussion with Elliot about the complexity of this operation.  I explained to him that despite the fact he has not had an infection with any of his previous operations, or a revision distal femoral replacement is a high risk operation with a high complication risk.  The risk of fracture, continued pain, failure, need for additional surgery, heart attack, stroke and death is elevated.  However, he is very disabled by his current symptoms and would like some options with respect to treatment.    Once we have the x-rays from the outside hospital, I will call him to discuss what treatment options are available.    *This note was created using voice recognition software and was not corrected for typographical or grammatical errors.*                               [1]   Past Medical History:  Diagnosis Date    Adverse effect of anesthesia     After hip surgery \"took 3 days to come off breathing machine\". emergence deliruim- becomes very violent    Anemia     11/21/24 H/h  13.7/39.1 Follows with heme/onc Manny Chandra, APRN-CNP LOV 5/1/24    Asthma     last exacerbation in Dec with URI- alb neb used    Chronic bronchitis (Multi)     Closed fracture dislocation of cervical spine     fusion    Delayed emergence from general anesthesia     Depression     Dyslipidemia     Elevated coronary artery calcium score     CT 1/26/24  272.59    Enlarged prostate with lower urinary tract symptoms (LUTS)     GERD (gastroesophageal reflux disease)     under control    Hepatitis B carrier (Multi)     History of cardiovascular stress test 05/22/2024    normal    History of central retinal vein occlusion 2000    " right eye    Hypertension     Legally blind     right eye    Lung nodule     CT 4/17/24 No suspicious pulmonary nodules identified.    OAB (overactive bladder)     Osteoarthritis     Postlaminectomy syndrome, lumbar     Thoracic ascending aortic aneurysm     CT 1/26/24 3.9 cm and unchanged from 2018    Type 2 diabetes mellitus     9/19/24 A1c 7.6    Urge incontinence     Urinary tract infection    [2]   Allergies  Allergen Reactions    Ciprofloxacin Itching    Ketorolac Rash    Latex Itching    Penicillins Other     Skin sloughing   [3]   Past Surgical History:  Procedure Laterality Date    CATARACT EXTRACTION Bilateral     CERVICAL LAMINECTOMY      COLONOSCOPY      GASTRIC BYPASS      Gastric Surgery For Morbid Obesity Gastric Bypass    HERNIA REPAIR Left     Inguinal Hernia Repair x 2    LUMBAR FUSION      Lumbar Vertebral Fusion    LUMBAR LAMINECTOMY      MR HEAD ANGIO WO IV CONTRAST  04/05/2021    MR HEAD ANGIO WO IV CONTRAST 4/5/2021 [facility]    ORIF HIP FRACTURE Left 06/25/2023    ORIF HIP PROXIMAL END, NECK, SLIDING HIP SCREW    OTHER SURGICAL HISTORY Left 02/01/2022    REVISION JOINT TOTAL KNEE FEMORAL AND ENTIRE TIBIAL COMPONENT    PENILE PROSTHESIS IMPLANT      SEPTOPLASTY      TOTAL KNEE ARTHROPLASTY Bilateral     VITRECTOMY     [4]   Family History  Problem Relation Name Age of Onset    Other (BOWEL ISCHEMIA/INFARCTION) Mother      Atrial fibrillation Mother      Blood clot Mother      Coronary artery disease Father      Heart attack Father      No Known Problems Brother      Suicidality Maternal Grandfather     [5]   Current Outpatient Medications   Medication Sig Dispense Refill    acetaminophen (Tylenol) 500 mg tablet Take 2 tablets (1,000 mg) by mouth every 8 hours if needed for moderate pain (4 - 6).      acyclovir (Zovirax) 800 mg tablet TAKE 1 TABLET EVERY DAY 90 tablet 1    albuterol 2.5 mg /3 mL (0.083 %) nebulizer solution Take 3 mL (2.5 mg) by nebulization every 6 hours if needed  for wheezing. (Patient not taking: Reported on 3/5/2025) 75 mL 3    Blood glucose monitoring meter kit (OneTouch Ultra2 Meter) kit 1 each if needed (check sugar every morning;). Check sugar every morning; dispense onetouch ultra meter 1 each 0    cholecalciferol (Vitamin D-3) 50 MCG (2000 UT) tablet Take 2 tablets (4,000 Units) by mouth 2 times a day.      cloNIDine (Catapres) 0.3 mg tablet TAKE 1 TABLET TWICE DAILY 30 tablet 0    cyanocobalamin (Vitamin B-12) 500 mcg tablet Take 1 tablet (500 mcg) by mouth once daily.      latanoprost (Xalatan) 0.005 % ophthalmic solution Administer 1 drop into both eyes once daily at bedtime. 7.5 mL 3    lisinopril 40 mg tablet TAKE 1 TABLET EVERY DAY 15 tablet 0    magnesium oxide (Mag-Ox) 400 mg tablet Take 1 tablet (400 mg) by mouth once daily.      melatonin 3 mg tablet Take 1 tablet (3 mg) by mouth once daily at bedtime.      metformin HCl (METFORMIN ORAL) Take 4 tablets by mouth once daily. Unsure of mg      montelukast (Singulair) 10 mg tablet TAKE 1 TABLET EVERY DAY 90 tablet 1    oxybutynin XL (Ditropan-XL) 10 mg 24 hr tablet TAKE 1 TABLET EVERY DAY 30 tablet 3    pantoprazole (ProtoNix) 20 mg EC tablet TAKE 1 TABLET EVERY DAY 90 tablet 3    polyethylene glycol (Glycolax, Miralax) 17 gram packet Take 17 g by mouth once daily as needed (constipation). As needed for constipation      rosuvastatin (Crestor) 10 mg tablet TAKE 1 TABLET EVERY DAY 90 tablet 3    tiZANidine (Zanaflex) 4 mg tablet TAKE 1 TABLET THREE TIMES DAILY 270 tablet 1    walker (Ultra-Light Rollator) misc Use for ambulation 1 each 0     No current facility-administered medications for this visit.

## 2025-06-08 ENCOUNTER — HOSPITAL ENCOUNTER (OUTPATIENT)
Dept: RADIOLOGY | Facility: EXTERNAL LOCATION | Age: 75
Discharge: HOME | End: 2025-06-08
Payer: MEDICARE

## 2025-06-08 NOTE — TELEPHONE ENCOUNTER
----- Message from Libra Corbett MD sent at 5/24/2024  4:12 PM EDT -----  They said to stop hydrochlorothiazide. We can add a beta blocker instead if he is willing.   - - -

## 2025-06-09 DIAGNOSIS — Z96.653 PRESENCE OF ARTIFICIAL KNEE JOINT, BILATERAL: ICD-10-CM

## 2025-06-09 DIAGNOSIS — S72.142A INTERTROCHANTERIC FRACTURE OF LEFT FEMUR, CLOSED, INITIAL ENCOUNTER: ICD-10-CM

## 2025-06-13 ENCOUNTER — APPOINTMENT (OUTPATIENT)
Dept: OPHTHALMOLOGY | Facility: CLINIC | Age: 75
End: 2025-06-13
Payer: MEDICARE

## 2025-07-03 ENCOUNTER — APPOINTMENT (OUTPATIENT)
Dept: ORTHOPEDIC SURGERY | Facility: HOSPITAL | Age: 75
End: 2025-07-03
Payer: MEDICARE

## 2025-07-09 DIAGNOSIS — N32.81 OVERACTIVE BLADDER: ICD-10-CM

## 2025-07-09 RX ORDER — OXYBUTYNIN CHLORIDE 10 MG/1
TABLET, EXTENDED RELEASE ORAL
Qty: 90 TABLET | Refills: 3 | Status: SHIPPED | OUTPATIENT
Start: 2025-07-09

## 2025-07-14 DIAGNOSIS — J45.909 ASTHMA, UNSPECIFIED ASTHMA SEVERITY, UNSPECIFIED WHETHER COMPLICATED, UNSPECIFIED WHETHER PERSISTENT (HHS-HCC): ICD-10-CM

## 2025-07-14 DIAGNOSIS — M51.369 DISC DEGENERATION, LUMBAR: ICD-10-CM

## 2025-07-14 DIAGNOSIS — Z00.00 HEALTHCARE MAINTENANCE: ICD-10-CM

## 2025-07-15 RX ORDER — MONTELUKAST SODIUM 10 MG/1
10 TABLET ORAL DAILY
Qty: 30 TABLET | Refills: 0 | Status: SHIPPED | OUTPATIENT
Start: 2025-07-15

## 2025-07-15 RX ORDER — ACYCLOVIR 800 MG/1
800 TABLET ORAL DAILY
Qty: 30 TABLET | Refills: 0 | Status: SHIPPED | OUTPATIENT
Start: 2025-07-15

## 2025-07-15 RX ORDER — TIZANIDINE 4 MG/1
4 TABLET ORAL 3 TIMES DAILY
Qty: 90 TABLET | Refills: 0 | Status: SHIPPED | OUTPATIENT
Start: 2025-07-15

## 2025-07-17 ENCOUNTER — OFFICE VISIT (OUTPATIENT)
Dept: ORTHOPEDIC SURGERY | Facility: HOSPITAL | Age: 75
End: 2025-07-17
Payer: MEDICARE

## 2025-07-17 ENCOUNTER — HOSPITAL ENCOUNTER (OUTPATIENT)
Dept: RADIOLOGY | Facility: EXTERNAL LOCATION | Age: 75
Discharge: HOME | End: 2025-07-17

## 2025-07-17 DIAGNOSIS — S72.142A INTERTROCHANTERIC FRACTURE OF LEFT FEMUR, CLOSED, INITIAL ENCOUNTER: ICD-10-CM

## 2025-07-17 DIAGNOSIS — Z96.659 FAILED TOTAL KNEE REPLACEMENT, INITIAL ENCOUNTER: ICD-10-CM

## 2025-07-17 DIAGNOSIS — Z96.653 PRESENCE OF ARTIFICIAL KNEE JOINT, BILATERAL: ICD-10-CM

## 2025-07-17 DIAGNOSIS — T84.018A FAILED TOTAL KNEE REPLACEMENT, INITIAL ENCOUNTER: ICD-10-CM

## 2025-07-17 LAB
CLARITY FLD: ABNORMAL
COLOR FLD: ABNORMAL
EOSINOPHIL NFR FLD MANUAL: 4 %
LYMPHOCYTES NFR FLD MANUAL: 18 %
MONOS+MACROS NFR FLD MANUAL: 49 %
NEUTROPHILS NFR FLD MANUAL: 29 %
RBC # FLD AUTO: ABNORMAL /UL
TOTAL CELLS COUNTED FLD: 45
WBC # FLD AUTO: 269 /UL

## 2025-07-17 PROCEDURE — 20611 DRAIN/INJ JOINT/BURSA W/US: CPT | Mod: LT | Performed by: FAMILY MEDICINE

## 2025-07-17 PROCEDURE — 89051 BODY FLUID CELL COUNT: CPT | Performed by: FAMILY MEDICINE

## 2025-07-17 PROCEDURE — 99203 OFFICE O/P NEW LOW 30 MIN: CPT | Performed by: FAMILY MEDICINE

## 2025-07-17 PROCEDURE — 4010F ACE/ARB THERAPY RXD/TAKEN: CPT | Performed by: FAMILY MEDICINE

## 2025-07-17 PROCEDURE — 87205 SMEAR GRAM STAIN: CPT | Mod: AHULAB | Performed by: FAMILY MEDICINE

## 2025-07-17 PROCEDURE — 99212 OFFICE O/P EST SF 10 MIN: CPT | Mod: 25 | Performed by: FAMILY MEDICINE

## 2025-07-17 PROCEDURE — 1159F MED LIST DOCD IN RCRD: CPT | Performed by: FAMILY MEDICINE

## 2025-07-17 NOTE — PROGRESS NOTES
** Please excuse any errors in grammar or translation related to this dictation. Voice recognition software was utilized to prepare this document. **    Assessment & Plan:  Patient presenting with concern of left femoral replacement failure.  As part of this evaluation patient was referred for left knee aspiration.  Patient was offered completion of this today and he agreed to have performed.  Aspirated fluid sent to lab for analysis.  Patient has follow-up scheduled for 7/29/2025 with Dr. Michael Garcia.       Chief complaint:  Left knee pain    HPI:  75-year-old presents at the request of Dr. Michael Garcia for knee aspiration.  Patient has a complex surgical history.  He had primary left TKA years ago that have been doing well.  Unfortunately he fell in 2022 and had a distal femur fracture and underwent distal femur replacement.  He had another fall in 2023 which required ORIF of his hip fracture.  Patient was evaluated by Dr. Michael Garcia in May and evaluation was concerning for failed left distal femoral replacement.  Patient has follow-up with Dr. Michael Garcia on 7/29/2025 to discuss further operative interventions.  He is currently using rollator to ambulate.      Problem List[1]  Medical History[2]  Surgical History[3]  Medications Ordered Prior to Encounter[4]    Exam:  General Exam:  Constitutional - NAD, AAO x 3, conversing appropriately.  HEENT- Normocephalic and atraumatic. No facial deformities. Hearing grossly normal.  Lungs - Breathing non-labored with normal rate. No accessory muscle use.  CV - Extremities warm and well-perfused, brisk capillary refill present.   Neuro - CN II-XII grossly intact.    Left knee demonstrates well-healed vertical surgical incision.  Small effusion.  No erythema or warmth.    Results:  Lab Results   Component Value Date    HGBA1C 6.2 (H) 02/06/2025    CREATININE 0.96 03/06/2025    EGFR 83 03/06/2025     Procedure:  Patient ID: Elliot Larson is a 75 y.o. male.    L Inj/Asp: L  knee on 7/17/2025 3:44 PM  Indications: pain, joint swelling and diagnostic evaluation  Details: 18 G needle, ultrasound-guided superolateral approach  Aspirate: 25 mL (Serosanguineous); sent for lab analysis  Outcome: tolerated well, no immediate complications    Procedure risk factors to include increased pain, bleeding, infection, neurovascular injury, soft tissue injury,  were discussed with the patient. Patient understands there is a moderate risk of morbidity from undergoing the procedure.    Procedure, treatment alternatives, risks and benefits explained, specific risks discussed. Consent was given by the patient. Immediately prior to procedure a time out was called to verify the correct patient, procedure, equipment, support staff and site/side marked as required. Patient was prepped and draped in the usual sterile fashion.              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    Dependent edema    Depression    Disc degeneration, lumbar    Dupuytren's contracture of left hand    Dyslipidemia    Enlarged prostate with lower urinary tract symptoms (LUTS)    Erectile disorder, acquired, generalized, severe    Male erectile disorder of organic origin    GERD (gastroesophageal reflux disease)    History of gastric bypass    S/P insertion of penile implant    Hypertension    Hypogonadism male    Insomnia    Diabetes mellitus without ophthalmic manifestations (Multi)    Lesion of pancreas (HHS-HCC)    Pancreatic abnormality    Low  "libido    Macrocytosis    Narrow angle of anterior chamber of left eye    Ocular hypertension of left eye    Onychomycosis    Osteoarthritis    PAD (peripheral artery disease)    Postlaminectomy syndrome, lumbar    Primary osteoarthritis of first carpometacarpal joint of right hand    Pseudophakia of left eye    PVD (posterior vitreous detachment)    Renal calcification    Retinal macular atrophy    Tinea pedis of both feet    Urge incontinence of urine    Urinary urgency    Vitamin D deficiency    Xerosis cutis    Anatomical narrow angle of left eye    Gait disorder    Periprosthetic fracture around internal prosthetic knee joint    Ocular hypertension    Generalized pruritus    Elevated coronary artery calcium score    Incidental lung nodule    Recent fracture of hip (Multi)    Femur fracture, left    Closed hip fracture requiring operative repair with routine healing    History of central retinal vein occlusion    Post-operative state    Intertrochanteric fracture of left femur, closed, initial encounter    Overactive bladder    Presence of artificial knee joint, bilateral    Presence of left artificial hip joint    Diabetic nephropathy (Multi)    OAB (overactive bladder)    Urge incontinence   [2]   Past Medical History:  Diagnosis Date    Adverse effect of anesthesia     After hip surgery \"took 3 days to come off breathing machine\". emergence deliruim- becomes very violent    Anemia     11/21/24 H/h  13.7/39.1 Follows with heme/onc Manny Chandra, APRN-CNP LOV 5/1/24    Asthma     last exacerbation in Dec with URI- alb neb used    Chronic bronchitis (Multi)     Closed fracture dislocation of cervical spine     fusion    Delayed emergence from general anesthesia     Depression     Dyslipidemia     Elevated coronary artery calcium score     CT 1/26/24  272.59    Enlarged prostate with lower urinary tract symptoms (LUTS)     GERD (gastroesophageal reflux disease)     under control    Hepatitis B carrier (Multi) "     History of cardiovascular stress test 05/22/2024    normal    History of central retinal vein occlusion 2000    right eye    Hypertension     Legally blind     right eye    Lung nodule     CT 4/17/24 No suspicious pulmonary nodules identified.    OAB (overactive bladder)     Osteoarthritis     Postlaminectomy syndrome, lumbar     Thoracic ascending aortic aneurysm     CT 1/26/24 3.9 cm and unchanged from 2018    Type 2 diabetes mellitus     9/19/24 A1c 7.6    Urge incontinence     Urinary tract infection    [3]   Past Surgical History:  Procedure Laterality Date    CATARACT EXTRACTION Bilateral     CERVICAL LAMINECTOMY      COLONOSCOPY      GASTRIC BYPASS      Gastric Surgery For Morbid Obesity Gastric Bypass    HERNIA REPAIR Left     Inguinal Hernia Repair x 2    LUMBAR FUSION      Lumbar Vertebral Fusion    LUMBAR LAMINECTOMY      MR HEAD ANGIO WO IV CONTRAST  04/05/2021    MR HEAD ANGIO WO IV CONTRAST 4/5/2021 [facility]    ORIF HIP FRACTURE Left 06/25/2023    ORIF HIP PROXIMAL END, NECK, SLIDING HIP SCREW    OTHER SURGICAL HISTORY Left 02/01/2022    REVISION JOINT TOTAL KNEE FEMORAL AND ENTIRE TIBIAL COMPONENT    PENILE PROSTHESIS IMPLANT      SEPTOPLASTY      TOTAL KNEE ARTHROPLASTY Bilateral     VITRECTOMY     [4]   Current Outpatient Medications on File Prior to Visit   Medication Sig Dispense Refill    acetaminophen (Tylenol) 500 mg tablet Take 2 tablets (1,000 mg) by mouth every 8 hours if needed for moderate pain (4 - 6).      acyclovir (Zovirax) 800 mg tablet TAKE 1 TABLET EVERY DAY 30 tablet 0    albuterol 2.5 mg /3 mL (0.083 %) nebulizer solution Take 3 mL (2.5 mg) by nebulization every 6 hours if needed for wheezing. (Patient not taking: Reported on 3/5/2025) 75 mL 3    Blood glucose monitoring meter kit (OneTouch Ultra2 Meter) kit 1 each if needed (check sugar every morning;). Check sugar every morning; dispense onetouch ultra meter 1 each 0    cholecalciferol (Vitamin D-3) 50 MCG (2000  UT) tablet Take 2 tablets (4,000 Units) by mouth 2 times a day.      cloNIDine (Catapres) 0.3 mg tablet TAKE 1 TABLET TWICE DAILY 30 tablet 0    cyanocobalamin (Vitamin B-12) 500 mcg tablet Take 1 tablet (500 mcg) by mouth once daily.      latanoprost (Xalatan) 0.005 % ophthalmic solution Administer 1 drop into both eyes once daily at bedtime. 7.5 mL 3    lisinopril 40 mg tablet TAKE 1 TABLET EVERY DAY 15 tablet 0    magnesium oxide (Mag-Ox) 400 mg tablet Take 1 tablet (400 mg) by mouth once daily.      melatonin 3 mg tablet Take 1 tablet (3 mg) by mouth once daily at bedtime.      metformin HCl (METFORMIN ORAL) Take 4 tablets by mouth once daily. Unsure of mg      montelukast (Singulair) 10 mg tablet TAKE 1 TABLET EVERY DAY 30 tablet 0    oxyBUTYnin XL (Ditropan-XL) 10 mg 24 hr tablet TAKE 1 TABLET EVERY DAY 90 tablet 3    pantoprazole (ProtoNix) 20 mg EC tablet TAKE 1 TABLET EVERY DAY 90 tablet 3    polyethylene glycol (Glycolax, Miralax) 17 gram packet Take 17 g by mouth once daily as needed (constipation). As needed for constipation      rosuvastatin (Crestor) 10 mg tablet TAKE 1 TABLET EVERY DAY 90 tablet 3    tiZANidine (Zanaflex) 4 mg tablet TAKE 1 TABLET THREE TIMES DAILY 90 tablet 0    walker (Ultra-Light Rollator) misc Use for ambulation 1 each 0    [DISCONTINUED] acyclovir (Zovirax) 800 mg tablet TAKE 1 TABLET EVERY DAY 90 tablet 1    [DISCONTINUED] montelukast (Singulair) 10 mg tablet TAKE 1 TABLET EVERY DAY 90 tablet 1    [DISCONTINUED] tiZANidine (Zanaflex) 4 mg tablet TAKE 1 TABLET THREE TIMES DAILY 270 tablet 1     No current facility-administered medications on file prior to visit.

## 2025-07-20 LAB
BACTERIA FLD CULT: NORMAL
GRAM STN SPEC: NORMAL
GRAM STN SPEC: NORMAL

## 2025-07-21 ENCOUNTER — TELEPHONE (OUTPATIENT)
Dept: ORTHOPEDIC SURGERY | Facility: CLINIC | Age: 75
End: 2025-07-21
Payer: MEDICARE

## 2025-07-21 ENCOUNTER — TELEPHONE (OUTPATIENT)
Dept: PRIMARY CARE | Facility: CLINIC | Age: 75
End: 2025-07-21
Payer: MEDICARE

## 2025-07-21 NOTE — TELEPHONE ENCOUNTER
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 left a voicemail message stating why his last thee prescriptions was only for one month supply, he's very displeased about this call him back at [phone].

## 2025-07-22 ENCOUNTER — APPOINTMENT (OUTPATIENT)
Dept: ORTHOPEDIC SURGERY | Facility: CLINIC | Age: 75
End: 2025-07-22
Payer: MEDICARE

## 2025-07-28 DIAGNOSIS — T84.018D FAILED TOTAL KNEE ARTHROPLASTY, SUBSEQUENT ENCOUNTER: ICD-10-CM

## 2025-07-28 DIAGNOSIS — Z01.818 PRE-OPERATIVE EXAM: ICD-10-CM

## 2025-07-28 DIAGNOSIS — Z96.659 FAILED TOTAL KNEE ARTHROPLASTY, SUBSEQUENT ENCOUNTER: ICD-10-CM

## 2025-07-28 DIAGNOSIS — T84.018D FAILURE OF TOTAL KNEE REPLACEMENT, SUBSEQUENT ENCOUNTER: ICD-10-CM

## 2025-07-28 DIAGNOSIS — Z96.659 FAILURE OF TOTAL KNEE REPLACEMENT, SUBSEQUENT ENCOUNTER: ICD-10-CM

## 2025-07-29 ENCOUNTER — APPOINTMENT (OUTPATIENT)
Dept: ORTHOPEDIC SURGERY | Facility: CLINIC | Age: 75
End: 2025-07-29
Payer: MEDICARE

## 2025-08-01 ENCOUNTER — APPOINTMENT (OUTPATIENT)
Dept: HEMATOLOGY/ONCOLOGY | Facility: CLINIC | Age: 75
End: 2025-08-01
Payer: MEDICARE

## 2025-08-05 DIAGNOSIS — M51.369 DISC DEGENERATION, LUMBAR: ICD-10-CM

## 2025-08-05 DIAGNOSIS — Z00.00 HEALTHCARE MAINTENANCE: ICD-10-CM

## 2025-08-05 DIAGNOSIS — J45.909 ASTHMA, UNSPECIFIED ASTHMA SEVERITY, UNSPECIFIED WHETHER COMPLICATED, UNSPECIFIED WHETHER PERSISTENT (HHS-HCC): ICD-10-CM

## 2025-08-05 RX ORDER — MONTELUKAST SODIUM 10 MG/1
10 TABLET ORAL DAILY
Qty: 30 TABLET | Refills: 0 | Status: SHIPPED | OUTPATIENT
Start: 2025-08-05

## 2025-08-05 RX ORDER — TIZANIDINE 4 MG/1
TABLET ORAL
Qty: 90 TABLET | Refills: 0 | Status: SHIPPED | OUTPATIENT
Start: 2025-08-05

## 2025-08-05 RX ORDER — ACYCLOVIR 800 MG/1
800 TABLET ORAL DAILY
Qty: 30 TABLET | Refills: 0 | Status: SHIPPED | OUTPATIENT
Start: 2025-08-05

## 2025-08-19 ENCOUNTER — HOSPITAL ENCOUNTER (OUTPATIENT)
Dept: RADIOLOGY | Facility: CLINIC | Age: 75
Discharge: HOME | End: 2025-08-19
Payer: MEDICARE

## 2025-08-19 ENCOUNTER — OFFICE VISIT (OUTPATIENT)
Dept: ORTHOPEDIC SURGERY | Facility: CLINIC | Age: 75
End: 2025-08-19
Payer: MEDICARE

## 2025-08-19 DIAGNOSIS — M17.12 PRIMARY OSTEOARTHRITIS OF LEFT KNEE: ICD-10-CM

## 2025-08-19 DIAGNOSIS — M17.10 PRIMARY OSTEOARTHRITIS OF KNEE, UNSPECIFIED LATERALITY: ICD-10-CM

## 2025-08-19 PROCEDURE — 73562 X-RAY EXAM OF KNEE 3: CPT | Mod: LT

## 2025-08-19 PROCEDURE — 77073 BONE LENGTH STUDIES: CPT

## 2025-08-20 ENCOUNTER — CLINICAL SUPPORT (OUTPATIENT)
Dept: PREADMISSION TESTING | Facility: HOSPITAL | Age: 75
End: 2025-08-20
Payer: MEDICARE

## 2025-08-25 ENCOUNTER — APPOINTMENT (OUTPATIENT)
Dept: DERMATOLOGY | Facility: CLINIC | Age: 75
End: 2025-08-25
Payer: MEDICARE

## 2025-08-25 DIAGNOSIS — B35.1 ONYCHOMYCOSIS: ICD-10-CM

## 2025-08-25 DIAGNOSIS — D48.5 NEOPLASM OF UNCERTAIN BEHAVIOR OF SKIN: Primary | ICD-10-CM

## 2025-08-25 DIAGNOSIS — D17.22 LIPOMA OF LEFT UPPER EXTREMITY: ICD-10-CM

## 2025-08-25 PROCEDURE — 11104 PUNCH BX SKIN SINGLE LESION: CPT | Performed by: STUDENT IN AN ORGANIZED HEALTH CARE EDUCATION/TRAINING PROGRAM

## 2025-08-25 PROCEDURE — 99203 OFFICE O/P NEW LOW 30 MIN: CPT | Performed by: STUDENT IN AN ORGANIZED HEALTH CARE EDUCATION/TRAINING PROGRAM

## 2025-08-25 PROCEDURE — 4010F ACE/ARB THERAPY RXD/TAKEN: CPT | Performed by: STUDENT IN AN ORGANIZED HEALTH CARE EDUCATION/TRAINING PROGRAM

## 2025-08-25 PROCEDURE — 1159F MED LIST DOCD IN RCRD: CPT | Performed by: STUDENT IN AN ORGANIZED HEALTH CARE EDUCATION/TRAINING PROGRAM

## 2025-08-27 ENCOUNTER — APPOINTMENT (OUTPATIENT)
Dept: PREADMISSION TESTING | Facility: HOSPITAL | Age: 75
End: 2025-08-27
Payer: MEDICARE

## 2025-08-27 LAB
LABORATORY COMMENT REPORT: NORMAL
PATH REPORT.FINAL DX SPEC: NORMAL
PATH REPORT.GROSS SPEC: NORMAL
PATH REPORT.MICROSCOPIC SPEC OTHER STN: NORMAL
PATH REPORT.RELEVANT HX SPEC: NORMAL
PATH REPORT.TOTAL CANCER: NORMAL

## 2025-09-02 ENCOUNTER — APPOINTMENT (OUTPATIENT)
Dept: PRIMARY CARE | Facility: CLINIC | Age: 75
End: 2025-09-02
Payer: MEDICARE

## 2025-09-03 ENCOUNTER — APPOINTMENT (OUTPATIENT)
Dept: PRIMARY CARE | Facility: CLINIC | Age: 75
End: 2025-09-03
Payer: MEDICARE

## 2025-09-04 ENCOUNTER — LAB (OUTPATIENT)
Dept: LAB | Facility: CLINIC | Age: 75
End: 2025-09-04
Payer: MEDICARE

## 2025-09-04 DIAGNOSIS — D69.6 THROMBOCYTOPENIA: ICD-10-CM

## 2025-09-04 DIAGNOSIS — D53.9 MACROCYTIC ANEMIA: ICD-10-CM

## 2025-09-04 LAB
BASOPHILS # BLD AUTO: 0.01 X10*3/UL (ref 0–0.1)
BASOPHILS NFR BLD AUTO: 0.2 %
EOSINOPHIL # BLD AUTO: 0.16 X10*3/UL (ref 0–0.4)
EOSINOPHIL NFR BLD AUTO: 3.4 %
ERYTHROCYTE [DISTWIDTH] IN BLOOD BY AUTOMATED COUNT: 11.9 % (ref 11.5–14.5)
FERRITIN SERPL-MCNC: 100 NG/ML (ref 20–300)
HCT VFR BLD AUTO: 38.8 % (ref 41–52)
HGB BLD-MCNC: 13.7 G/DL (ref 13.5–17.5)
IMM GRANULOCYTES # BLD AUTO: 0 X10*3/UL (ref 0–0.5)
IMM GRANULOCYTES NFR BLD AUTO: 0 % (ref 0–0.9)
IRON SATN MFR SERPL: 33 % (ref 25–45)
IRON SERPL-MCNC: 107 UG/DL (ref 35–150)
LYMPHOCYTES # BLD AUTO: 1.35 X10*3/UL (ref 0.8–3)
LYMPHOCYTES NFR BLD AUTO: 28.8 %
MCH RBC QN AUTO: 34.8 PG (ref 26–34)
MCHC RBC AUTO-ENTMCNC: 35.3 G/DL (ref 32–36)
MCV RBC AUTO: 99 FL (ref 80–100)
MONOCYTES # BLD AUTO: 0.36 X10*3/UL (ref 0.05–0.8)
MONOCYTES NFR BLD AUTO: 7.7 %
NEUTROPHILS # BLD AUTO: 2.81 X10*3/UL (ref 1.6–5.5)
NEUTROPHILS NFR BLD AUTO: 59.9 %
PLATELET # BLD AUTO: 143 X10*3/UL (ref 150–450)
RBC # BLD AUTO: 3.94 X10*6/UL (ref 4.5–5.9)
TIBC SERPL-MCNC: 329 UG/DL (ref 240–445)
UIBC SERPL-MCNC: 222 UG/DL (ref 110–370)
WBC # BLD AUTO: 4.7 X10*3/UL (ref 4.4–11.3)

## 2025-09-04 PROCEDURE — 83550 IRON BINDING TEST: CPT

## 2025-09-04 PROCEDURE — 36415 COLL VENOUS BLD VENIPUNCTURE: CPT

## 2025-09-04 PROCEDURE — 85025 COMPLETE CBC W/AUTO DIFF WBC: CPT

## 2025-09-04 PROCEDURE — 82607 VITAMIN B-12: CPT

## 2025-09-04 PROCEDURE — 82728 ASSAY OF FERRITIN: CPT

## 2025-09-05 ENCOUNTER — OFFICE VISIT (OUTPATIENT)
Dept: HEMATOLOGY/ONCOLOGY | Facility: CLINIC | Age: 75
End: 2025-09-05
Payer: MEDICARE

## 2025-09-05 VITALS
DIASTOLIC BLOOD PRESSURE: 75 MMHG | RESPIRATION RATE: 16 BRPM | SYSTOLIC BLOOD PRESSURE: 157 MMHG | TEMPERATURE: 98.1 F | OXYGEN SATURATION: 94 % | BODY MASS INDEX: 33.59 KG/M2 | HEART RATE: 70 BPM | WEIGHT: 220.9 LBS

## 2025-09-05 DIAGNOSIS — D53.9 MACROCYTIC ANEMIA: ICD-10-CM

## 2025-09-05 DIAGNOSIS — D69.6 THROMBOCYTOPENIA: ICD-10-CM

## 2025-09-05 LAB — VIT B12 SERPL-MCNC: 474 PG/ML (ref 211–911)

## 2025-09-05 PROCEDURE — 99214 OFFICE O/P EST MOD 30 MIN: CPT

## 2025-09-05 PROCEDURE — 1159F MED LIST DOCD IN RCRD: CPT

## 2025-09-05 PROCEDURE — 3078F DIAST BP <80 MM HG: CPT

## 2025-09-05 PROCEDURE — 1125F AMNT PAIN NOTED PAIN PRSNT: CPT

## 2025-09-05 PROCEDURE — 3044F HG A1C LEVEL LT 7.0%: CPT

## 2025-09-05 PROCEDURE — 4010F ACE/ARB THERAPY RXD/TAKEN: CPT

## 2025-09-05 PROCEDURE — 3077F SYST BP >= 140 MM HG: CPT

## 2025-09-05 ASSESSMENT — PAIN SCALES - GENERAL: PAINLEVEL_OUTOF10: 4

## 2025-09-17 ENCOUNTER — APPOINTMENT (OUTPATIENT)
Dept: PRIMARY CARE | Facility: CLINIC | Age: 75
End: 2025-09-17
Payer: MEDICARE

## 2025-10-24 ENCOUNTER — APPOINTMENT (OUTPATIENT)
Dept: OPHTHALMOLOGY | Facility: CLINIC | Age: 75
End: 2025-10-24
Payer: MEDICARE

## 2025-10-30 ENCOUNTER — APPOINTMENT (OUTPATIENT)
Dept: UROLOGY | Facility: HOSPITAL | Age: 75
End: 2025-10-30
Payer: MEDICARE

## (undated) DEVICE — DRAPE, C-ARM, W/12 IN COVER, LI XTRAY TUBE

## (undated) DEVICE — Device

## (undated) DEVICE — NEEDLE, HYPODERMIC, 25 G X 1.5 IN, A BEVEL, STERILE

## (undated) DEVICE — ADHESIVE, SKIN, DERMABOND ADVANCED, 15CM, PEN-STYLE

## (undated) DEVICE — DRAPE COVER, C ARM, FLOUROSCAN IMAGING SYS

## (undated) DEVICE — DRAPE, SHEET, 17 X 23 IN

## (undated) DEVICE — REMOTE CONTROL, PATIENT (2301)

## (undated) DEVICE — WOUND SYSTEM, DEBRIDEMENT & CLEANING, O.R DUOPAK

## (undated) DEVICE — GLOVE, SURGEON, PREMIERPRO PI, MICRO, SZ-7.5, PF, WH

## (undated) DEVICE — TOWEL PACK, STERILE, 4/PACK, BLUE

## (undated) DEVICE — TRIAL, STIMULATOR, EXTERNAL

## (undated) DEVICE — COVER, C-ARM W/CLIPS, OEC GE

## (undated) DEVICE — SUTURE, VICRYL, 2-0, 27 IN, BR/SH 27, VIOLET

## (undated) DEVICE — DRESSING, ISLAND, TELFA, 4 X 5 IN

## (undated) DEVICE — KIT, LEAD IMPLANT